# Patient Record
Sex: FEMALE | Race: WHITE | NOT HISPANIC OR LATINO | Employment: OTHER | URBAN - METROPOLITAN AREA
[De-identification: names, ages, dates, MRNs, and addresses within clinical notes are randomized per-mention and may not be internally consistent; named-entity substitution may affect disease eponyms.]

---

## 2023-01-01 ENCOUNTER — OFFICE VISIT (OUTPATIENT)
Dept: CARDIOLOGY | Facility: CLINIC | Age: 73
End: 2023-01-01
Payer: MEDICARE

## 2023-01-01 ENCOUNTER — APPOINTMENT (OUTPATIENT)
Dept: PAIN MEDICINE | Facility: CLINIC | Age: 73
End: 2023-01-01
Payer: MEDICARE

## 2023-01-01 ENCOUNTER — HOSPITAL ENCOUNTER (OUTPATIENT)
Dept: VASCULAR MEDICINE | Facility: CLINIC | Age: 73
Discharge: HOME | End: 2023-12-06
Payer: MEDICARE

## 2023-01-01 ENCOUNTER — OFFICE VISIT (OUTPATIENT)
Dept: WOUND CARE | Facility: CLINIC | Age: 73
End: 2023-01-01
Payer: MEDICARE

## 2023-01-01 ENCOUNTER — CLINICAL SUPPORT (OUTPATIENT)
Dept: WOUND CARE | Facility: CLINIC | Age: 73
End: 2023-01-01
Payer: MEDICARE

## 2023-01-01 ENCOUNTER — OFFICE VISIT (OUTPATIENT)
Dept: PAIN MEDICINE | Facility: CLINIC | Age: 73
End: 2023-01-01
Payer: MEDICARE

## 2023-01-01 ENCOUNTER — OFFICE VISIT (OUTPATIENT)
Dept: NEUROLOGY | Facility: CLINIC | Age: 73
End: 2023-01-01
Payer: MEDICARE

## 2023-01-01 ENCOUNTER — APPOINTMENT (OUTPATIENT)
Dept: WOUND CARE | Facility: CLINIC | Age: 73
End: 2023-01-01
Payer: MEDICARE

## 2023-01-01 VITALS
RESPIRATION RATE: 18 BRPM | TEMPERATURE: 97.2 F | HEIGHT: 65 IN | HEART RATE: 104 BPM | WEIGHT: 105 LBS | SYSTOLIC BLOOD PRESSURE: 159 MMHG | BODY MASS INDEX: 17.49 KG/M2 | DIASTOLIC BLOOD PRESSURE: 87 MMHG

## 2023-01-01 VITALS
HEART RATE: 93 BPM | DIASTOLIC BLOOD PRESSURE: 52 MMHG | OXYGEN SATURATION: 96 % | SYSTOLIC BLOOD PRESSURE: 100 MMHG | HEIGHT: 65 IN | BODY MASS INDEX: 17.49 KG/M2 | WEIGHT: 105 LBS

## 2023-01-01 VITALS — BODY MASS INDEX: 17.68 KG/M2 | WEIGHT: 110 LBS | HEIGHT: 66 IN

## 2023-01-01 DIAGNOSIS — M62.81 MUSCLE WEAKNESS: ICD-10-CM

## 2023-01-01 DIAGNOSIS — G20.A1 PARKINSON'S DISEASE WITHOUT FLUCTUATING MANIFESTATIONS, UNSPECIFIED WHETHER DYSKINESIA PRESENT (MULTI): ICD-10-CM

## 2023-01-01 DIAGNOSIS — E78.00 HYPERCHOLESTEROLEMIA: Primary | ICD-10-CM

## 2023-01-01 DIAGNOSIS — I70.1 RENAL ARTERY STENOSIS (CMS-HCC): ICD-10-CM

## 2023-01-01 DIAGNOSIS — E78.5 DYSLIPIDEMIA: Primary | ICD-10-CM

## 2023-01-01 DIAGNOSIS — I71.43 INFRARENAL ABDOMINAL AORTIC ANEURYSM (AAA) WITHOUT RUPTURE (CMS-HCC): ICD-10-CM

## 2023-01-01 DIAGNOSIS — M48.062 LUMBAR STENOSIS WITH NEUROGENIC CLAUDICATION: Primary | ICD-10-CM

## 2023-01-01 DIAGNOSIS — I48.0 PAROXYSMAL ATRIAL FIBRILLATION (MULTI): ICD-10-CM

## 2023-01-01 DIAGNOSIS — M48.062 LUMBAR STENOSIS WITH NEUROGENIC CLAUDICATION: ICD-10-CM

## 2023-01-01 DIAGNOSIS — M54.16 LUMBAR RADICULOPATHY: ICD-10-CM

## 2023-01-01 DIAGNOSIS — M43.10 DEGENERATIVE SPONDYLOLISTHESIS: Primary | ICD-10-CM

## 2023-01-01 DIAGNOSIS — I65.23 BILATERAL CAROTID ARTERY STENOSIS: ICD-10-CM

## 2023-01-01 DIAGNOSIS — F03.B18 MODERATE DEMENTIA WITH OTHER BEHAVIORAL DISTURBANCE, UNSPECIFIED DEMENTIA TYPE (MULTI): Primary | ICD-10-CM

## 2023-01-01 DIAGNOSIS — I71.40 ABDOMINAL AORTIC ANEURYSM, WITHOUT RUPTURE, UNSPECIFIED (CMS-HCC): ICD-10-CM

## 2023-01-01 DIAGNOSIS — R09.89 OTHER SPECIFIED SYMPTOMS AND SIGNS INVOLVING THE CIRCULATORY AND RESPIRATORY SYSTEMS: ICD-10-CM

## 2023-01-01 DIAGNOSIS — F03.90 DEMENTIA, UNSPECIFIED DEMENTIA SEVERITY, UNSPECIFIED DEMENTIA TYPE, UNSPECIFIED WHETHER BEHAVIORAL, PSYCHOTIC, OR MOOD DISTURBANCE OR ANXIETY (MULTI): Primary | ICD-10-CM

## 2023-01-01 LAB
ALANINE AMINOTRANSFERASE (SGPT) (U/L) IN SER/PLAS: 13 U/L (ref 7–45)
ALANINE AMINOTRANSFERASE (SGPT) (U/L) IN SER/PLAS: 31 U/L (ref 7–45)
ALBUMIN (G/DL) IN SER/PLAS: 2.3 G/DL (ref 3.4–5)
ALBUMIN (G/DL) IN SER/PLAS: 3.4 G/DL (ref 3.4–5)
ALKALINE PHOSPHATASE (U/L) IN SER/PLAS: 89 U/L (ref 33–136)
ALKALINE PHOSPHATASE (U/L) IN SER/PLAS: 93 U/L (ref 33–136)
ANION GAP IN SER/PLAS: 11 MMOL/L (ref 10–20)
ANION GAP IN SER/PLAS: 11 MMOL/L (ref 10–20)
ANION GAP IN SER/PLAS: 19 MMOL/L (ref 10–20)
ASPARTATE AMINOTRANSFERASE (SGOT) (U/L) IN SER/PLAS: 22 U/L (ref 9–39)
ASPARTATE AMINOTRANSFERASE (SGOT) (U/L) IN SER/PLAS: 24 U/L (ref 9–39)
BILIRUBIN TOTAL (MG/DL) IN SER/PLAS: 0.3 MG/DL (ref 0–1.2)
BILIRUBIN TOTAL (MG/DL) IN SER/PLAS: 0.5 MG/DL (ref 0–1.2)
CALCIDIOL (25 OH VITAMIN D3) (NG/ML) IN SER/PLAS: 84 NG/ML
CALCIDIOL (25 OH VITAMIN D3) (NG/ML) IN SER/PLAS: 88 NG/ML
CALCIUM (MG/DL) IN SER/PLAS: 7.1 MG/DL (ref 8.6–10.3)
CALCIUM (MG/DL) IN SER/PLAS: 8.4 MG/DL (ref 8.6–10.3)
CALCIUM (MG/DL) IN SER/PLAS: 9.2 MG/DL (ref 8.6–10.3)
CARBON DIOXIDE, TOTAL (MMOL/L) IN SER/PLAS: 21 MMOL/L (ref 21–32)
CARBON DIOXIDE, TOTAL (MMOL/L) IN SER/PLAS: 25 MMOL/L (ref 21–32)
CARBON DIOXIDE, TOTAL (MMOL/L) IN SER/PLAS: 29 MMOL/L (ref 21–32)
CHLORIDE (MMOL/L) IN SER/PLAS: 101 MMOL/L (ref 98–107)
CHLORIDE (MMOL/L) IN SER/PLAS: 103 MMOL/L (ref 98–107)
CHLORIDE (MMOL/L) IN SER/PLAS: 110 MMOL/L (ref 98–107)
CREATININE (MG/DL) IN SER/PLAS: 0.76 MG/DL (ref 0.5–1.05)
CREATININE (MG/DL) IN SER/PLAS: 0.93 MG/DL (ref 0.5–1.05)
CREATININE (MG/DL) IN SER/PLAS: 0.96 MG/DL (ref 0.5–1.05)
ERYTHROCYTE DISTRIBUTION WIDTH (RATIO) BY AUTOMATED COUNT: 15.3 % (ref 11.5–14.5)
ERYTHROCYTE DISTRIBUTION WIDTH (RATIO) BY AUTOMATED COUNT: 15.6 % (ref 11.5–14.5)
ERYTHROCYTE DISTRIBUTION WIDTH (RATIO) BY AUTOMATED COUNT: 16.3 % (ref 11.5–14.5)
ERYTHROCYTE MEAN CORPUSCULAR HEMOGLOBIN CONCENTRATION (G/DL) BY AUTOMATED: 28.6 G/DL (ref 32–36)
ERYTHROCYTE MEAN CORPUSCULAR HEMOGLOBIN CONCENTRATION (G/DL) BY AUTOMATED: 29.6 G/DL (ref 32–36)
ERYTHROCYTE MEAN CORPUSCULAR HEMOGLOBIN CONCENTRATION (G/DL) BY AUTOMATED: 29.6 G/DL (ref 32–36)
ERYTHROCYTE MEAN CORPUSCULAR VOLUME (FL) BY AUTOMATED COUNT: 95 FL (ref 80–100)
ERYTHROCYTE MEAN CORPUSCULAR VOLUME (FL) BY AUTOMATED COUNT: 95 FL (ref 80–100)
ERYTHROCYTE MEAN CORPUSCULAR VOLUME (FL) BY AUTOMATED COUNT: 98 FL (ref 80–100)
ERYTHROCYTES (10*6/UL) IN BLOOD BY AUTOMATED COUNT: 2.81 X10E12/L (ref 4–5.2)
ERYTHROCYTES (10*6/UL) IN BLOOD BY AUTOMATED COUNT: 3.37 X10E12/L (ref 4–5.2)
ERYTHROCYTES (10*6/UL) IN BLOOD BY AUTOMATED COUNT: 3.97 X10E12/L (ref 4–5.2)
FERRITIN (UG/LL) IN SER/PLAS: 64 UG/L (ref 8–150)
GFR FEMALE: 63 ML/MIN/1.73M2
GFR FEMALE: 65 ML/MIN/1.73M2
GFR FEMALE: 83 ML/MIN/1.73M2
GLUCOSE (MG/DL) IN SER/PLAS: 47 MG/DL (ref 74–99)
GLUCOSE (MG/DL) IN SER/PLAS: 82 MG/DL (ref 74–99)
GLUCOSE (MG/DL) IN SER/PLAS: 98 MG/DL (ref 74–99)
HEMATOCRIT (%) IN BLOOD BY AUTOMATED COUNT: 26.7 % (ref 36–46)
HEMATOCRIT (%) IN BLOOD BY AUTOMATED COUNT: 32.1 % (ref 36–46)
HEMATOCRIT (%) IN BLOOD BY AUTOMATED COUNT: 39.1 % (ref 36–46)
HEMOGLOBIN (G/DL) IN BLOOD: 11.2 G/DL (ref 12–16)
HEMOGLOBIN (G/DL) IN BLOOD: 7.9 G/DL (ref 12–16)
HEMOGLOBIN (G/DL) IN BLOOD: 9.5 G/DL (ref 12–16)
IRON (UG/DL) IN SER/PLAS: 34 UG/DL (ref 35–150)
IRON BINDING CAPACITY (UG/DL) IN SER/PLAS: 236 UG/DL (ref 240–445)
IRON SATURATION (%) IN SER/PLAS: 14 % (ref 25–45)
LEUKOCYTES (10*3/UL) IN BLOOD BY AUTOMATED COUNT: 10 X10E9/L (ref 4.4–11.3)
LEUKOCYTES (10*3/UL) IN BLOOD BY AUTOMATED COUNT: 7.5 X10E9/L (ref 4.4–11.3)
LEUKOCYTES (10*3/UL) IN BLOOD BY AUTOMATED COUNT: 9.2 X10E9/L (ref 4.4–11.3)
MAGNESIUM (MG/DL) IN SER/PLAS: 2.48 MG/DL (ref 1.6–2.4)
MISCELLANEUOUS TEST RESULT: NORMAL
NAME OF SENDOUT TEST: NORMAL
NRBC (PER 100 WBCS) BY AUTOMATED COUNT: 0 /100 WBC (ref 0–0)
PARATHYRIN INTACT (PG/ML) IN SER/PLAS: 42.9 PG/ML (ref 18.5–88)
PLATELETS (10*3/UL) IN BLOOD AUTOMATED COUNT: 299 X10E9/L (ref 150–450)
PLATELETS (10*3/UL) IN BLOOD AUTOMATED COUNT: 334 X10E9/L (ref 150–450)
PLATELETS (10*3/UL) IN BLOOD AUTOMATED COUNT: 371 X10E9/L (ref 150–450)
POTASSIUM (MMOL/L) IN SER/PLAS: 3 MMOL/L (ref 3.5–5.3)
POTASSIUM (MMOL/L) IN SER/PLAS: 3.8 MMOL/L (ref 3.5–5.3)
POTASSIUM (MMOL/L) IN SER/PLAS: 4.3 MMOL/L (ref 3.5–5.3)
PROTEIN TOTAL: 5.7 G/DL (ref 6.4–8.2)
PROTEIN TOTAL: 8.2 G/DL (ref 6.4–8.2)
SODIUM (MMOL/L) IN SER/PLAS: 137 MMOL/L (ref 136–145)
SODIUM (MMOL/L) IN SER/PLAS: 139 MMOL/L (ref 136–145)
SODIUM (MMOL/L) IN SER/PLAS: 143 MMOL/L (ref 136–145)
THYROTROPIN (MIU/L) IN SER/PLAS BY DETECTION LIMIT <= 0.05 MIU/L: 1.41 MIU/L (ref 0.44–3.98)
THYROTROPIN (MIU/L) IN SER/PLAS BY DETECTION LIMIT <= 0.05 MIU/L: 1.89 MIU/L (ref 0.44–3.98)
UREA NITROGEN (MG/DL) IN SER/PLAS: 12 MG/DL (ref 6–23)
UREA NITROGEN (MG/DL) IN SER/PLAS: 13 MG/DL (ref 6–23)
UREA NITROGEN (MG/DL) IN SER/PLAS: 17 MG/DL (ref 6–23)

## 2023-01-01 PROCEDURE — 99213 OFFICE O/P EST LOW 20 MIN: CPT | Performed by: ANESTHESIOLOGY

## 2023-01-01 PROCEDURE — 1160F RVW MEDS BY RX/DR IN RCRD: CPT | Performed by: PSYCHIATRY & NEUROLOGY

## 2023-01-01 PROCEDURE — 1160F RVW MEDS BY RX/DR IN RCRD: CPT | Performed by: ANESTHESIOLOGY

## 2023-01-01 PROCEDURE — 1036F TOBACCO NON-USER: CPT | Performed by: PSYCHIATRY & NEUROLOGY

## 2023-01-01 PROCEDURE — 3079F DIAST BP 80-89 MM HG: CPT | Performed by: PSYCHIATRY & NEUROLOGY

## 2023-01-01 PROCEDURE — 1159F MED LIST DOCD IN RCRD: CPT | Performed by: ANESTHESIOLOGY

## 2023-01-01 PROCEDURE — 1159F MED LIST DOCD IN RCRD: CPT | Performed by: PSYCHIATRY & NEUROLOGY

## 2023-01-01 PROCEDURE — 93975 VASCULAR STUDY: CPT | Performed by: SURGERY

## 2023-01-01 PROCEDURE — 11042 DBRDMT SUBQ TIS 1ST 20SQCM/<: CPT

## 2023-01-01 PROCEDURE — 3074F SYST BP LT 130 MM HG: CPT | Performed by: INTERNAL MEDICINE

## 2023-01-01 PROCEDURE — 93880 EXTRACRANIAL BILAT STUDY: CPT | Performed by: SURGERY

## 2023-01-01 PROCEDURE — 3008F BODY MASS INDEX DOCD: CPT | Performed by: PSYCHIATRY & NEUROLOGY

## 2023-01-01 PROCEDURE — 93978 VASCULAR STUDY: CPT

## 2023-01-01 PROCEDURE — 3077F SYST BP >= 140 MM HG: CPT | Performed by: PSYCHIATRY & NEUROLOGY

## 2023-01-01 PROCEDURE — 93880 EXTRACRANIAL BILAT STUDY: CPT

## 2023-01-01 PROCEDURE — 3066F NEPHROPATHY DOC TX: CPT | Performed by: PSYCHIATRY & NEUROLOGY

## 2023-01-01 PROCEDURE — 11042 DBRDMT SUBQ TIS 1ST 20SQCM/<: CPT | Performed by: NURSE PRACTITIONER

## 2023-01-01 PROCEDURE — 1159F MED LIST DOCD IN RCRD: CPT | Performed by: INTERNAL MEDICINE

## 2023-01-01 PROCEDURE — 3008F BODY MASS INDEX DOCD: CPT | Performed by: ANESTHESIOLOGY

## 2023-01-01 PROCEDURE — 99215 OFFICE O/P EST HI 40 MIN: CPT | Performed by: INTERNAL MEDICINE

## 2023-01-01 PROCEDURE — 1125F AMNT PAIN NOTED PAIN PRSNT: CPT | Performed by: PSYCHIATRY & NEUROLOGY

## 2023-01-01 PROCEDURE — 99204 OFFICE O/P NEW MOD 45 MIN: CPT | Performed by: PSYCHIATRY & NEUROLOGY

## 2023-01-01 PROCEDURE — 3066F NEPHROPATHY DOC TX: CPT | Performed by: ANESTHESIOLOGY

## 2023-01-01 PROCEDURE — 93975 VASCULAR STUDY: CPT

## 2023-01-01 PROCEDURE — 3066F NEPHROPATHY DOC TX: CPT | Performed by: INTERNAL MEDICINE

## 2023-01-01 PROCEDURE — 3078F DIAST BP <80 MM HG: CPT | Performed by: INTERNAL MEDICINE

## 2023-01-01 PROCEDURE — 1125F AMNT PAIN NOTED PAIN PRSNT: CPT | Performed by: INTERNAL MEDICINE

## 2023-01-01 PROCEDURE — 1125F AMNT PAIN NOTED PAIN PRSNT: CPT | Performed by: ANESTHESIOLOGY

## 2023-01-01 PROCEDURE — 1160F RVW MEDS BY RX/DR IN RCRD: CPT | Performed by: INTERNAL MEDICINE

## 2023-01-01 PROCEDURE — 3008F BODY MASS INDEX DOCD: CPT | Performed by: INTERNAL MEDICINE

## 2023-01-01 RX ORDER — LANCETS
EACH MISCELLANEOUS
COMMUNITY
End: 2024-01-01 | Stop reason: ENTERED-IN-ERROR

## 2023-01-01 RX ORDER — ROSUVASTATIN CALCIUM 40 MG/1
1 TABLET, COATED ORAL DAILY
COMMUNITY
Start: 2022-03-05 | End: 2023-01-01

## 2023-01-01 RX ORDER — NALOXONE HYDROCHLORIDE 4 MG/.1ML
0.1 SPRAY NASAL AS NEEDED
COMMUNITY
Start: 2021-09-29 | End: 2024-01-01 | Stop reason: ENTERED-IN-ERROR

## 2023-01-01 RX ORDER — OXYCODONE AND ACETAMINOPHEN 5; 325 MG/1; MG/1
1 TABLET ORAL EVERY 8 HOURS PRN
COMMUNITY
Start: 2022-09-29 | End: 2023-01-01 | Stop reason: SDUPTHER

## 2023-01-01 RX ORDER — ACETAMINOPHEN 325 MG/1
650 TABLET ORAL EVERY 4 HOURS PRN
COMMUNITY
Start: 2022-12-09 | End: 2024-01-01 | Stop reason: ENTERED-IN-ERROR

## 2023-01-01 RX ORDER — OXYCODONE AND ACETAMINOPHEN 5; 325 MG/1; MG/1
1 TABLET ORAL EVERY 8 HOURS PRN
Qty: 90 TABLET | Refills: 0 | Status: SHIPPED | OUTPATIENT
Start: 2023-01-01 | End: 2023-01-01 | Stop reason: SDUPTHER

## 2023-01-01 RX ORDER — GABAPENTIN 100 MG/1
100 CAPSULE ORAL 3 TIMES DAILY
COMMUNITY
Start: 2020-08-06 | End: 2023-01-01 | Stop reason: SDUPTHER

## 2023-01-01 RX ORDER — BLOOD SUGAR DIAGNOSTIC
STRIP MISCELLANEOUS
COMMUNITY
Start: 2020-07-21 | End: 2024-01-01 | Stop reason: ENTERED-IN-ERROR

## 2023-01-01 RX ORDER — DULOXETIN HYDROCHLORIDE 30 MG/1
30 CAPSULE, DELAYED RELEASE ORAL DAILY
COMMUNITY
Start: 2023-01-01 | End: 2024-01-01 | Stop reason: HOSPADM

## 2023-01-01 RX ORDER — POTASSIUM CHLORIDE 20 MEQ/1
1 TABLET, EXTENDED RELEASE ORAL DAILY
COMMUNITY
End: 2023-01-01 | Stop reason: WASHOUT

## 2023-01-01 RX ORDER — GLIMEPIRIDE 1 MG/1
0.5 TABLET ORAL EVERY MORNING
COMMUNITY
Start: 2023-01-01 | End: 2024-01-01 | Stop reason: HOSPADM

## 2023-01-01 RX ORDER — OXYCODONE AND ACETAMINOPHEN 5; 325 MG/1; MG/1
1 TABLET ORAL EVERY 8 HOURS PRN
Qty: 90 TABLET | Refills: 0 | Status: SHIPPED | OUTPATIENT
Start: 2023-01-01 | End: 2024-01-01 | Stop reason: SDUPTHER

## 2023-01-01 RX ORDER — IBUPROFEN 200 MG
1 TABLET ORAL EVERY 24 HOURS
COMMUNITY
Start: 2022-12-09 | End: 2023-01-01 | Stop reason: WASHOUT

## 2023-01-01 RX ORDER — METOPROLOL TARTRATE 50 MG/1
50 TABLET ORAL EVERY 12 HOURS
COMMUNITY
Start: 2022-12-09 | End: 2023-01-01 | Stop reason: WASHOUT

## 2023-01-01 RX ORDER — TRAZODONE HYDROCHLORIDE 50 MG/1
50 TABLET ORAL NIGHTLY PRN
COMMUNITY
Start: 2018-02-19 | End: 2023-01-01 | Stop reason: WASHOUT

## 2023-01-01 RX ORDER — LEVOTHYROXINE SODIUM 88 UG/1
88 TABLET ORAL
COMMUNITY
Start: 2023-01-01

## 2023-01-01 RX ORDER — CARBIDOPA AND LEVODOPA 25; 100 MG/1; MG/1
TABLET ORAL
Qty: 270 TABLET | Refills: 3 | Status: SHIPPED
Start: 2023-01-01 | End: 2024-03-24

## 2023-01-01 RX ORDER — LEVOTHYROXINE SODIUM 112 UG/1
1 TABLET ORAL DAILY
COMMUNITY
End: 2023-01-01 | Stop reason: WASHOUT

## 2023-01-01 RX ORDER — CALCITRIOL 0.25 UG/1
0.25 CAPSULE ORAL
COMMUNITY
Start: 2021-04-09

## 2023-01-01 RX ORDER — METOPROLOL TARTRATE 25 MG/1
1 TABLET, FILM COATED ORAL 2 TIMES DAILY
COMMUNITY
Start: 2020-06-12

## 2023-01-01 RX ORDER — ROSUVASTATIN CALCIUM 40 MG/1
40 TABLET, COATED ORAL DAILY
Qty: 30 TABLET | Refills: 2 | Status: SHIPPED | OUTPATIENT
Start: 2023-01-01 | End: 2024-01-01

## 2023-01-01 RX ORDER — TRAMADOL HYDROCHLORIDE 50 MG/1
50 TABLET ORAL EVERY 6 HOURS PRN
COMMUNITY
Start: 2022-12-09 | End: 2023-01-01 | Stop reason: WASHOUT

## 2023-01-01 RX ORDER — GABAPENTIN 100 MG/1
100 CAPSULE ORAL 3 TIMES DAILY
Qty: 90 CAPSULE | Refills: 3 | Status: ON HOLD | OUTPATIENT
Start: 2023-01-01 | End: 2024-01-01

## 2023-01-01 RX ORDER — GLIMEPIRIDE 2 MG/1
1 TABLET ORAL DAILY
COMMUNITY
End: 2023-01-01 | Stop reason: WASHOUT

## 2023-01-01 RX ORDER — VIT C/E/ZN/COPPR/LUTEIN/ZEAXAN 250MG-90MG
25 CAPSULE ORAL DAILY
COMMUNITY
End: 2024-01-01 | Stop reason: ENTERED-IN-ERROR

## 2023-01-01 RX ORDER — TORSEMIDE 10 MG/1
1 TABLET ORAL DAILY
COMMUNITY
Start: 2022-05-17 | End: 2023-01-01 | Stop reason: WASHOUT

## 2023-01-01 RX ORDER — TAMSULOSIN HYDROCHLORIDE 0.4 MG/1
0.4 CAPSULE ORAL DAILY
COMMUNITY
Start: 2023-01-01 | End: 2024-01-01 | Stop reason: ENTERED-IN-ERROR

## 2023-01-01 ASSESSMENT — ENCOUNTER SYMPTOMS
LOSS OF SENSATION IN FEET: 1
SHORTNESS OF BREATH: 0
DEPRESSION: 1
FEVER: 0
OCCASIONAL FEELINGS OF UNSTEADINESS: 1
BACK PAIN: 1

## 2023-01-01 ASSESSMENT — LIFESTYLE VARIABLES: TOTAL SCORE: 0

## 2023-01-01 ASSESSMENT — PATIENT HEALTH QUESTIONNAIRE - PHQ9
SUM OF ALL RESPONSES TO PHQ9 QUESTIONS 1 AND 2: 0
2. FEELING DOWN, DEPRESSED OR HOPELESS: NOT AT ALL
1. LITTLE INTEREST OR PLEASURE IN DOING THINGS: NOT AT ALL

## 2023-01-01 ASSESSMENT — PAIN - FUNCTIONAL ASSESSMENT: PAIN_FUNCTIONAL_ASSESSMENT: 0-10

## 2023-01-01 ASSESSMENT — COLUMBIA-SUICIDE SEVERITY RATING SCALE - C-SSRS
1. IN THE PAST MONTH, HAVE YOU WISHED YOU WERE DEAD OR WISHED YOU COULD GO TO SLEEP AND NOT WAKE UP?: NO
2. HAVE YOU ACTUALLY HAD ANY THOUGHTS OF KILLING YOURSELF?: NO
6. HAVE YOU EVER DONE ANYTHING, STARTED TO DO ANYTHING, OR PREPARED TO DO ANYTHING TO END YOUR LIFE?: NO

## 2023-01-01 ASSESSMENT — PAIN SCALES - GENERAL
PAINLEVEL_OUTOF10: 10 - WORST POSSIBLE PAIN
PAINLEVEL: INELIGIBLE

## 2023-01-01 ASSESSMENT — PAIN DESCRIPTION - DESCRIPTORS: DESCRIPTORS: ACHING;BURNING

## 2023-09-23 PROBLEM — M47.816 FACET DEGENERATION OF LUMBAR REGION: Status: ACTIVE | Noted: 2023-01-01

## 2023-09-23 PROBLEM — E03.9 HYPOTHYROIDISM: Status: ACTIVE | Noted: 2023-01-01

## 2023-09-23 PROBLEM — M43.10 DEGENERATIVE SPONDYLOLISTHESIS: Status: ACTIVE | Noted: 2023-01-01

## 2023-09-23 PROBLEM — L97.519 ULCER OF RIGHT FOOT (MULTI): Status: ACTIVE | Noted: 2023-01-01

## 2023-09-23 PROBLEM — I71.40 AAA (ABDOMINAL AORTIC ANEURYSM) (CMS-HCC): Status: ACTIVE | Noted: 2023-01-01

## 2023-09-23 PROBLEM — I65.29 CAROTID STENOSIS: Status: ACTIVE | Noted: 2023-01-01

## 2023-09-23 PROBLEM — R60.0 LOWER EXTREMITY EDEMA: Status: ACTIVE | Noted: 2023-01-01

## 2023-09-23 PROBLEM — I25.10 CORONARY ATHEROSCLEROSIS: Status: ACTIVE | Noted: 2023-01-01

## 2023-09-23 PROBLEM — N18.9 CKD (CHRONIC KIDNEY DISEASE): Status: ACTIVE | Noted: 2023-01-01

## 2023-09-23 PROBLEM — E78.00 HYPERCHOLESTEROLEMIA: Status: ACTIVE | Noted: 2023-01-01

## 2023-09-23 PROBLEM — I48.91 A-FIB (MULTI): Status: ACTIVE | Noted: 2023-01-01

## 2023-09-23 PROBLEM — I65.23 BILATERAL CAROTID ARTERY STENOSIS: Status: ACTIVE | Noted: 2023-01-01

## 2023-09-23 PROBLEM — I73.9 PAD (PERIPHERAL ARTERY DISEASE) (CMS-HCC): Status: ACTIVE | Noted: 2023-01-01

## 2023-09-23 PROBLEM — I67.1 SACCULAR ANEURYSM (HHS-HCC): Status: ACTIVE | Noted: 2023-01-01

## 2023-09-23 PROBLEM — M54.16 LUMBAR RADICULOPATHY: Status: ACTIVE | Noted: 2023-01-01

## 2023-09-23 PROBLEM — I21.4 NON-ST ELEVATION MYOCARDIAL INFARCTION (NSTEMI) IN RECOVERY PHASE (MULTI): Status: ACTIVE | Noted: 2023-01-01

## 2023-09-23 PROBLEM — M48.062 LUMBAR STENOSIS WITH NEUROGENIC CLAUDICATION: Status: ACTIVE | Noted: 2023-01-01

## 2023-09-23 PROBLEM — R09.89 CAROTID BRUIT: Status: ACTIVE | Noted: 2023-01-01

## 2023-09-23 PROBLEM — M46.1 SACROILIITIS (CMS-HCC): Status: ACTIVE | Noted: 2023-01-01

## 2023-09-23 PROBLEM — I10 HYPERTENSION: Status: ACTIVE | Noted: 2023-01-01

## 2023-09-23 PROBLEM — Z95.5 H/O HEART ARTERY STENT: Status: ACTIVE | Noted: 2023-01-01

## 2023-09-23 PROBLEM — I70.1 RENAL ARTERY STENOSIS (CMS-HCC): Status: ACTIVE | Noted: 2023-01-01

## 2023-09-23 PROBLEM — E10.9 DM (DIABETES MELLITUS), TYPE 1 (MULTI): Status: ACTIVE | Noted: 2023-01-01

## 2023-09-23 PROBLEM — R78.81 BACTEREMIA: Status: ACTIVE | Noted: 2023-01-01

## 2023-09-23 PROBLEM — M51.26 LUMBAR HERNIATED DISC: Status: ACTIVE | Noted: 2023-01-01

## 2023-09-23 PROBLEM — E07.9 THYROID DISEASE: Status: ACTIVE | Noted: 2023-01-01

## 2023-09-23 PROBLEM — I51.9 HEART DISEASE: Status: ACTIVE | Noted: 2023-01-01

## 2023-10-20 NOTE — PROGRESS NOTES
Chief Complain  Follow-up (For pain in lower back, that have been going on for several years. Currently taking percocet and it does not help.)       History Of Present Illness  Irina Stratton is a 72 y.o. female here for low back pain. The patient rates the pain at 7  on a scale from 0-10.  The patient describes pain as dull, aching.  The pain is worsened by walking and standing and is alleviated by medications prescribed pain medications.  Since the last visit the pain has stayed the same.  The patient denies any fever, chills, weight loss, bladder/bowel incontinence.         Past Medical History  She has a past medical history of Abnormal radiologic findings on diagnostic imaging of right kidney and Personal history of other medical treatment.    Surgical History  She has a past surgical history that includes  section, classic (2016); Other surgical history (2020); Other surgical history (2020); Other surgical history (2020); Other surgical history (2020); Other surgical history (2019); CT angio abdomen pelvis w and or wo IV IV contrast (2019); and CT angio aorta and bilateral iliofemoral runoff w and or wo IV contrast (2020).     Social History  She has no history on file for tobacco use, alcohol use, and drug use.    Family History  Family History   Problem Relation Name Age of Onset    No Known Problems Mother          Allergies  Patient has no known allergies.    Review of Systems  Review of Systems   Constitutional:  Negative for fever.   Respiratory:  Negative for shortness of breath.    Cardiovascular:  Negative for chest pain.   Musculoskeletal:  Positive for back pain.        Physical Exam  Physical Exam  HENT:      Head: Normocephalic.   Eyes:      Extraocular Movements: Extraocular movements intact.      Pupils: Pupils are equal, round, and reactive to light.   Pulmonary:      Effort: Pulmonary effort is normal.   Neurological:      Mental Status:  "She is oriented to person, place, and time.           Last Recorded Vitals  Height 1.676 m (5' 6\"), weight 49.9 kg (110 lb).       Assessment/Plan     Irina Stratton is a 72 y.o. female here for follow-up of low back pain.  She has been experiencing the symptoms for years, previously failed epidural steroid injections.  Now on chronic opiate therapy currently taking Percocet 5 mg 3 times daily.  She is a poor historian, appears to be having significant issues with her memory.  Would recommend referral to neurology for further management of her memory issues.  As far as Percocet is concerned it does provide her with modest relief of pain.  I discussed with the patient and the family to utilize Tylenol preferably to manage her pain use Percocet only for severe pain.  Denies any significant side effects from the Percocet.  I have personally reviewed the OARRS report. This report is scanned into the electronic medical record. I have considered the risks of abuse, dependence, addiction and diversion.         Ramiro Liu MD  "

## 2023-11-07 NOTE — TELEPHONE ENCOUNTER
Dad canceled appointment today and will like to reschedule for an appointment after 4:30.  Please advise Please call to schedule f/u

## 2023-11-08 NOTE — TELEPHONE ENCOUNTER
Hi Dr Liu, can you please take a look at this patient for me.  She just saw you on October 20 and does have a FUV scheduled for 1/19/24 with you.  Can you please advise if you need to see her sooner for medicine refill?

## 2023-12-07 PROBLEM — N18.4 CHRONIC KIDNEY DISEASE, STAGE 4 (SEVERE) (MULTI): Status: ACTIVE | Noted: 2022-12-12

## 2023-12-07 PROBLEM — D64.9 ANEMIA, UNSPECIFIED: Status: ACTIVE | Noted: 2022-12-12

## 2023-12-07 PROBLEM — I44.7 LEFT BUNDLE-BRANCH BLOCK, UNSPECIFIED: Status: ACTIVE | Noted: 2022-12-12

## 2023-12-07 PROBLEM — I15.0: Status: ACTIVE | Noted: 2023-01-01

## 2023-12-07 PROBLEM — E87.6 HYPOKALEMIA: Status: ACTIVE | Noted: 2022-12-12

## 2023-12-07 PROBLEM — M54.50 LOW BACK PAIN, UNSPECIFIED: Status: ACTIVE | Noted: 2022-12-12

## 2023-12-07 PROBLEM — I25.2 OLD MYOCARDIAL INFARCTION: Status: ACTIVE | Noted: 2022-12-12

## 2023-12-07 PROBLEM — M19.90 UNSPECIFIED OSTEOARTHRITIS, UNSPECIFIED SITE: Status: ACTIVE | Noted: 2018-10-18

## 2023-12-07 PROBLEM — F17.200 NICOTINE DEPENDENCE, UNSPECIFIED, UNCOMPLICATED: Status: ACTIVE | Noted: 2018-10-18

## 2023-12-07 PROBLEM — F32.A DEPRESSION, UNSPECIFIED: Status: ACTIVE | Noted: 2023-01-01

## 2023-12-07 PROBLEM — E87.20 ACIDOSIS, UNSPECIFIED: Status: ACTIVE | Noted: 2023-01-01

## 2023-12-07 PROBLEM — R77.0 ABNORMALITY OF ALBUMIN: Status: ACTIVE | Noted: 2023-01-01

## 2023-12-07 PROBLEM — Z72.0 TOBACCO USE: Status: ACTIVE | Noted: 2018-10-18

## 2023-12-07 PROBLEM — E78.5 HYPERLIPIDEMIA: Status: ACTIVE | Noted: 2018-10-18

## 2023-12-07 PROBLEM — D72.829 ELEVATED WHITE BLOOD CELL COUNT, UNSPECIFIED: Status: ACTIVE | Noted: 2023-01-01

## 2023-12-07 PROBLEM — I70.1: Status: ACTIVE | Noted: 2023-01-01

## 2023-12-07 PROBLEM — I48.0 PAROXYSMAL ATRIAL FIBRILLATION (MULTI): Status: ACTIVE | Noted: 2022-12-12

## 2023-12-07 PROBLEM — N13.9 OBSTRUCTIVE AND REFLUX UROPATHY, UNSPECIFIED: Status: ACTIVE | Noted: 2023-01-01

## 2023-12-07 PROBLEM — L89.154 PRESSURE ULCER OF SACRAL REGION, STAGE 4 (MULTI): Status: ACTIVE | Noted: 2023-01-01

## 2023-12-07 PROBLEM — K80.80 OTHER CHOLELITHIASIS WITHOUT OBSTRUCTION: Status: ACTIVE | Noted: 2022-12-12

## 2023-12-07 PROBLEM — N28.1 CYST OF KIDNEY, ACQUIRED: Status: ACTIVE | Noted: 2022-12-12

## 2023-12-07 PROBLEM — E46 UNSPECIFIED PROTEIN-CALORIE MALNUTRITION (MULTI): Status: ACTIVE | Noted: 2023-01-01

## 2023-12-07 PROBLEM — I65.29 OCCLUSION AND STENOSIS OF UNSPECIFIED CAROTID ARTERY: Status: ACTIVE | Noted: 2018-10-18

## 2023-12-07 PROBLEM — J43.9 EMPHYSEMA, UNSPECIFIED (MULTI): Status: ACTIVE | Noted: 2022-12-12

## 2023-12-07 PROBLEM — G47.00 INSOMNIA, UNSPECIFIED: Status: ACTIVE | Noted: 2022-12-12

## 2023-12-08 NOTE — PATIENT INSTRUCTIONS
Hematuria - keep eye on it over weekend; we will call you Monday to check in and see if we need to make adjustments.  Referral is made to Dr. Ottoniel Dominguez for your back.  Follow up with us in 6 months with cholesterol panel.  Think about [facility] for your atrial fibrillation.

## 2023-12-08 NOTE — PROGRESS NOTES
PCP: Felipe Rojo MD      Subjective   Irina Stratton is a 73 y.o. female who is here for follow up of  panvascular disease .  Since last visit,  appears more frail .  Patient seen with  and daughter.     Review of Systems:  Otherwise, limited cardiovascular review of systems is negative.    Past Medical History:  She has a past medical history of Abnormal radiologic findings on diagnostic imaging of right kidney and Personal history of other medical treatment.    Surgical History:   She has a past surgical history that includes  section, classic (2016); Other surgical history (2020); Other surgical history (2020); Other surgical history (2020); Other surgical history (2020); Other surgical history (2019); CT angio abdomen pelvis w and or wo IV IV contrast (2019); and CT angio aorta and bilateral iliofemoral runoff w and or wo IV contrast (2020).    Family History:   Family History   Problem Relation Name Age of Onset    No Known Problems Mother       Family History   Problem Relation Name Age of Onset    No Known Problems Mother         Social History:   Tobacco Use: Not on file       Outpatient Medications:    Current Outpatient Medications:     acetaminophen (Tylenol) 325 mg tablet, Take 2 tablets (650 mg) by mouth every 4 hours if needed for fever (temp greater than 38.0 C) (Pain)., Disp: , Rfl:     blood sugar diagnostic (Accu-Chek Guide test strips) strip, Accu-Chek Guide In Vitro Strip  Quantity: 100  Refills: 0      Start : 2020  Active, Disp: , Rfl:     calcitriol (Rocaltrol) 0.25 mcg capsule, Take 1 capsule (0.25 mcg) by mouth. Monday, Wednesday, and Friday, Disp: , Rfl:     cholecalciferol (Vitamin D-3) 25 MCG (1000 UT) capsule, Take 1 capsule (25 mcg) by mouth once daily., Disp: , Rfl:     DULoxetine (Cymbalta) 30 mg DR capsule, Take 1 capsule (30 mg) by mouth once daily., Disp: , Rfl:     gabapentin (Neurontin) 100 mg capsule,  "Take 1 capsule (100 mg) by mouth 3 times a day., Disp: , Rfl:     glimepiride (Amaryl) 1 mg tablet, Take 1 tablet (1 mg) by mouth once daily in the morning. Take before meals., Disp: , Rfl:     lancets misc, Accu-chek Fastclix Lancets, Disp: , Rfl:     levothyroxine (Synthroid, Levoxyl) 88 mcg tablet, Take 1 tablet (88 mcg) by mouth once daily in the morning. Take before meals., Disp: , Rfl:     metoprolol tartrate (Lopressor) 25 mg tablet, Take 1 tablet (25 mg) by mouth 2 times a day., Disp: , Rfl:     naloxone (Narcan) 4 mg/0.1 mL nasal spray, Administer 1 spray (4 mg) into affected nostril(s) if needed., Disp: , Rfl:     oxyCODONE-acetaminophen (Percocet) 5-325 mg tablet, Take 1 tablet by mouth every 8 hours if needed for severe pain (7 - 10) or moderate pain (4 - 6)., Disp: 90 tablet, Rfl: 0    rivaroxaban (Xarelto) 20 mg tablet, Take 1 tablet (20 mg) by mouth once daily., Disp: , Rfl:     rosuvastatin (Crestor) 40 mg tablet, Take 1 tablet (40 mg) by mouth once daily., Disp: 30 tablet, Rfl: 2    tamsulosin (Flomax) 0.4 mg 24 hr capsule, Take 1 capsule (0.4 mg) by mouth once daily., Disp: , Rfl:      Allergies:  Patient has no known allergies.       Objective   Vital Signs:  /52 (BP Location: Left arm, Patient Position: Sitting, BP Cuff Size: Adult)   Pulse 93   Ht 1.651 m (5' 5\")   Wt 47.6 kg (105 lb)   SpO2 96%   BMI 17.47 kg/m²     Physical Exam:  General: frail, cachectic  HEENT: EOMI, no scleral icterus.  Lungs: Clear to auscultation bilaterally without wheezing, rales, or rhonchi.  Cardiovascular: Regular rhythm and rate. Normal S1 and S2. No murmurs, rubs, or gallops are appreciated. JVP normal.  right carotid bruit  Abdomen: Soft, nontender, nondistended. Bowel sounds present.  Extremities: 1+ PT RLE, 1+ PT LLE, and no edema BLE.  Neurologic: Alert and oriented x3.  Has chronic albarran d/t sacral ulcer - blood tinged (cranberry juice in color)    Pertinent Recent Cardiovascular Studies " (personally reviewed):  Vascular studies:  Carotid duplex 12/8/2023: no ICA stenosis - velocities are lower than 2022 study  Aortic duplex 12/8/2023: stable EVAR  Renal duplex 12/8/2023: widely patent stent    Laboratory values:  CMP:  Recent Labs     07/24/23  1222 07/10/23  0600 07/08/23  0400 07/05/23  0621 07/04/23  1020 07/03/23  0703 07/02/23  0625 07/01/23  0615 05/22/23  1151 12/14/22  0700 12/05/22  1744 12/05/22  0851 05/05/20  1032 02/28/20  1746 01/17/20  1600 01/17/20  0505 01/16/20  1830 01/16/20  0515 01/15/20  0218 01/14/20  0357    139 143 140 138 136 140 140   < > 141   < > 135*   < > 136   < > 134*   < > 135* 134* 136   K 4.3 3.8 3.0* 4.1 4.0 3.4* 3.7 3.4*   < > 4.7   < > 2.8*   < > 3.8   < > 4.2   < > 3.8 3.9 3.3*    103 110* 109* 108* 107 106 106   < > 109*   < > 98   < > 100   < > 105   < > 105 104 106   CO2 21 29 25 24 21 21 23 24   < > 23   < > 26   < > 22   < > 14*   < > 21 21 19*   ANIONGAP 19 11 11 11 13 11 15 13   < > 14   < > 14   < > 18   < > 19   < > 13 13 14   BUN 13 17 12 10 10 12 14 17   < > 16   < > 44*   < > 26*   < > 12   < > 11 11 8   CREATININE 0.76 0.93 0.96 0.75 0.76 0.91 1.02 0.91   < > 1.11*   < > 1.69*   < > 1.42*   < > 0.79   < > 0.87 0.97 0.86   MG 2.48*  --   --   --   --   --   --   --   --  2.10  --  1.88  --  1.81  --  1.93  --  2.05 2.29 1.41*    < > = values in this interval not displayed.     Recent Labs     07/24/23  1222 07/08/23  0400 07/03/23  0703 07/02/23  0625 07/01/23  0615   ALBUMIN 3.4 2.3* 2.3* 2.5* 2.5*   ALKPHOS 93 89 82 93 79   ALT 13 31 71* 107* 88*   AST 22 24 70* 141* 128*   BILITOT 0.5 0.3 0.5 0.6 0.8     CBC:  Recent Labs     07/24/23  1222 07/10/23  0600 07/08/23  0400 07/05/23  0621 07/03/23  0703 07/02/23  0625 07/01/23  0615 06/30/23  0618   WBC 9.2 10.0 7.5 8.9 8.8 10.5 12.3* 14.8*   HGB 11.2* 9.5* 7.9* 10.2* 9.5* 10.4* 8.8* 9.8*   HCT 39.1 32.1* 26.7* 34.5* 33.6* 35.3* 29.9* 33.2*    371 334 365 296 335 307 323   MCV 98  95 95 98 103* 95 95 99     COAG:   Recent Labs     12/07/22  0444 12/06/22  0302 12/05/22  2249 12/05/22  1744 12/05/22  0647 05/31/20  0614 02/28/20  1746 01/13/20  1445 01/07/20  0830 10/12/18  1750   INR  --   --   --   --  1.2* 1.1 1.1 1.1 1.0 1.0   HAUF 0.8 0.4 0.4 1.1*  --   --   --   --   --   --      ABO:   Recent Labs     06/03/20  0510   ABO B     HEME/ENDO:  Recent Labs     07/24/23  1222 07/10/23  0600 07/08/23  0400 05/22/23  1151 12/14/22  0700 12/05/22  1228 10/03/22  1227 07/19/21  1202 01/26/21  1004 01/26/21  1004 11/02/20  1025 01/07/20  1011   FERRITIN  --  64  --   --   --  82  --   --   --   --   --   --    IRONSAT  --  14*  --   --   --  13*  --   --   --   --  11*  --    TSH 1.89  --  1.41 0.67 0.08*  --    < > 3.79   < > 0.24*  --   --    HGBA1C  --   --   --   --  7.4*  --   --  8.2  --  7.8  --  7.7    < > = values in this interval not displayed.      CARDIAC:   Recent Labs     12/04/22  2359 12/04/22  2157 12/04/22  1728   TROPHS 497* 467* 446*     Recent Labs     07/19/21  1202 11/02/20  1022 05/05/20  1032   CHOL 178 218* 203*   LDLF 92 - -   HDL 36.6* 37.1* 35.8*   TRIG 245* 428* 420*     MICRO:   Recent Labs     06/29/23  1736   CRP 13.30*     Susceptibility data from last 90 days.  Collected Specimen Info Organism Clindamycin Erythromycin Oxacillin Tetracycline Trimethoprim/Sulfamethoxazole Vancomycin   09/29/23 Tissue/Wound Staphylococcus aureus R R R I R S          I have personally reviewed most recent PCP, cardiology, vascular, and/or podiatry documentation.      Assessment/Plan   73 y.o. female with CAD s/p PCI (post-op MI after EVAR), pAF on DOAC, HTN, CKD with occluded LRA s/p RRA stent, AAA s/p EVAR, DM, DLD, R asymptomatic carotid disease (velocities are lower today). CLTI bilaterally status post CFA endarterectomy.     Patient previously introduced to [facility]; not ready for this decision at this time.    She is limited in mobility due to back pain. Has been seen by   Oscar previously - did not want to operate on aspirin; her risks are high coming off all anti-plt/anti-coag due to prior post-op MI.    Lastly, has hematuria x1 day from chronic indwelling albarran (seems cranberry juice color).     Plan:  For hematuria - continue monitor over weekend; we will call her Monday to see if Xarelto interruption indicated (if so, needs to use aspirin 81 mg daily while off).  For secondary prevention, FLP is due.  Consideration of [facility] again discussed.  Referral to Dr. Tani Dominguez for second opinion re: back surgical options.  Encouraged PO intake with Boost and [employer].  May need to consider starting paperwork for HPOA etc. - d/w pt//daughter.  Follow up 6 months.         SIGNATURE: Mario Joe MD PATIENT NAME: Irina Stratton   DATE/TIME: December 8, 2023 11:53 AM MRN: [id number]

## 2023-12-12 NOTE — PROGRESS NOTES
Consultation:  Subjective     Irina Stratton is a 73 y.o. year old female here for dementia consult. Referred by Guy Liu.    HPI  Arrives with  and daughter, who provides more history.   MRI brain may 2023 personally reviewed - showed hydrocephalus and global atrophy and chronic small vessel disease.   She has Afib, CKD, DM.   The back pain is worsening a lot. She has not walked since about March due to her back.  She had metabolic encephalopathy, lots of pain medication reactions.   Cognitive issues over the past 6 months.   R hand tremor , brief jerking of of extremities.   She has some tightness, abnormal postures.   Has a sacral wound in June, has nursing staff. Has improved.   She has dry mouth, does not have good po intake.    FH: mother had strokes, CAD, CABG.   She used to be a nurse.     She is unable to stand on her own.   Review of Systems    Patient Active Problem List   Diagnosis    Paroxysmal atrial fibrillation (CMS/HCC)    AAA (abdominal aortic aneurysm) (CMS/HCC)    Bacteremia    Bilateral carotid artery stenosis    Carotid bruit    Chronic kidney disease, stage 4 (severe) (CMS/HCC)    Coronary atherosclerosis    Degenerative spondylolisthesis    DM (diabetes mellitus), type 1 (CMS/HCC)    Facet degeneration of lumbar region    H/O heart artery stent    Hypertension    Hyperlipidemia    Hypothyroidism    Lower extremity edema    Lumbar herniated disc    Lumbar radiculopathy    Lumbar stenosis with neurogenic claudication    PAD (peripheral artery disease) (CMS/HCC)    Carotid stenosis    Renal artery stenosis (CMS/HCC)    Saccular aneurysm    Sacroiliitis (CMS/HCC)    Ulcer of right foot (CMS/HCC)    Abnormality of albumin    Acidosis, unspecified    Anemia, unspecified    Benign secondary hypertension due to renal artery stenosis (CMS/HCC)    Cyst of kidney, acquired    Depression, unspecified    Elevated white blood cell count, unspecified    Emphysema, unspecified (CMS/HCC)     Hypokalemia    Insomnia, unspecified    Left bundle-branch block, unspecified    Low back pain, unspecified    Tobacco use    Obstructive and reflux uropathy, unspecified    Occlusion and stenosis of unspecified carotid artery    Other cholelithiasis without obstruction    Pressure ulcer of sacral region, stage 4 (CMS/HCC)    Unspecified osteoarthritis, unspecified site    Unspecified protein-calorie malnutrition (CMS/HCC)    Old myocardial infarction     Past Medical History:   Diagnosis Date    Abnormal radiologic findings on diagnostic imaging of right kidney     Abnormal ultrasound of both kidneys    Personal history of other medical treatment     History of echocardiogram     Past Surgical History:   Procedure Laterality Date     SECTION, CLASSIC  2016     Section    CT ABDOMEN PELVIS ANGIOGRAM W AND/OR WO IV CONTRAST  2019    CT ABDOMEN PELVIS ANGIOGRAM W AND/OR WO IV CONTRAST 2019 [facility]    CT AORTA AND BILATERAL ILIOFEMORAL RUNOFF ANGIOGRAM W AND/OR WO IV CONTRAST  2020    CT AORTA AND BILATERAL ILIOFEMORAL RUNOFF ANGIOGRAM W AND/OR WO IV CONTRAST 2020 [facility]    OTHER SURGICAL HISTORY  2020    History of prior surgery    OTHER SURGICAL HISTORY  2020    Coronary artery stent placement    OTHER SURGICAL HISTORY  2020    Abdominal aortic aneurysm repair endovascular    OTHER SURGICAL HISTORY  2020    Cardiac catheterization    OTHER SURGICAL HISTORY  2019    Renal angioplasty and stenting     Social History     Tobacco Use    Smoking status: Not on file    Smokeless tobacco: Not on file   Substance Use Topics    Alcohol use: Not on file     family history includes No Known Problems in her mother.    Current Outpatient Medications:     acetaminophen (Tylenol) 325 mg tablet, Take 2 tablets (650 mg) by mouth every 4 hours if needed for fever (temp greater than 38.0 C) (Pain)., Disp: , Rfl:     blood sugar  diagnostic (Accu-Chek Guide test strips) strip, Accu-Chek Guide In Vitro Strip  Quantity: 100  Refills: 0      Start : 21-Jul-2020  Active, Disp: , Rfl:     calcitriol (Rocaltrol) 0.25 mcg capsule, Take 1 capsule (0.25 mcg) by mouth. Monday, Wednesday, and Friday, Disp: , Rfl:     cholecalciferol (Vitamin D-3) 25 MCG (1000 UT) capsule, Take 1 capsule (25 mcg) by mouth once daily., Disp: , Rfl:     DULoxetine (Cymbalta) 30 mg DR capsule, Take 1 capsule (30 mg) by mouth once daily., Disp: , Rfl:     gabapentin (Neurontin) 100 mg capsule, Take 1 capsule (100 mg) by mouth 3 times a day., Disp: , Rfl:     glimepiride (Amaryl) 1 mg tablet, Take 1 tablet (1 mg) by mouth once daily in the morning. Take before meals., Disp: , Rfl:     lancets misc, Accu-chek Fastclix Lancets, Disp: , Rfl:     levothyroxine (Synthroid, Levoxyl) 88 mcg tablet, Take 1 tablet (88 mcg) by mouth once daily in the morning. Take before meals., Disp: , Rfl:     metoprolol tartrate (Lopressor) 25 mg tablet, Take 1 tablet (25 mg) by mouth 2 times a day., Disp: , Rfl:     naloxone (Narcan) 4 mg/0.1 mL nasal spray, Administer 1 spray (4 mg) into affected nostril(s) if needed., Disp: , Rfl:     oxyCODONE-acetaminophen (Percocet) 5-325 mg tablet, Take 1 tablet by mouth every 8 hours if needed for severe pain (7 - 10) or moderate pain (4 - 6)., Disp: 90 tablet, Rfl: 0    rivaroxaban (Xarelto) 20 mg tablet, Take 1 tablet (20 mg) by mouth once daily., Disp: , Rfl:     rosuvastatin (Crestor) 40 mg tablet, Take 1 tablet (40 mg) by mouth once daily., Disp: 30 tablet, Rfl: 2    tamsulosin (Flomax) 0.4 mg 24 hr capsule, Take 1 capsule (0.4 mg) by mouth once daily., Disp: , Rfl:   No Known Allergies  There were no vitals taken for this visit.  Neurological Exam/Physical Exam:    Constitutional: General appearance: no acute distress. Pleasant.   Auscultation of Heart: Regular rate and rhythm, no murmurs, normal S1 and S2.   Carotid Arteries: no bruits  Peripheral  Vascular Exam: No swelling, edema or tenderness to palpation in extremities  Memory is impaired. Fluent. Language impaired.   Eyes: The ophthalmoscopic examination was normal.   Cranial nerve II: Visual fields full to confrontation.   Cranial nerves III, IV, and VI: Pupils round, equally reactive to light; EOMs intact. No nystagmus. L eye ptosis.   Cranial Nerve V: Facial sensation intact to LT bilaterally.   Cranial nerve VII: no facial droop  Cranial nerve VIII: Hearing is intact  Cranial nerves IX and X: Palate elevates symmetrically.   Cranial nerve XI: Shoulder shrug intact.   Cranial nerve XII: Tongue is midline.  Motor:  Muscle bulk was normal in both upper and lower extremities.     Atrophy in all extremities. Right side dystonic posturing.right resting tremor is mild with Right hand flexed.   Rigidity and spasticity in all ext, worse on R.   Deep Tendon Reflexes: left biceps 2+ , right biceps 2+, left triceps 2+, right triceps 2+, left brachioradialis 2+, right brachioradialis 2+, left patella 2+, right patella 2+, left ankle jerk 2+, right ankle jerk 2+   Plantar Reflex: Toes downgoing to plantar stimulation on the left. Toes downgoing to plantar stimulation on the right.   Sensory Exam: Normal to vibratory sensation  Coordination:  impaired in all ext, mostly on R side   Gait:  nonambulatory.          Labs:  CBC:   Lab Results   Component Value Date    WBC 9.2 07/24/2023    HGB 11.2 (L) 07/24/2023    HCT 39.1 07/24/2023     07/24/2023     BMP:   Lab Results   Component Value Date     07/24/2023    K 4.3 07/24/2023     07/24/2023    CO2 21 07/24/2023    BUN 13 07/24/2023    CREATININE 0.76 07/24/2023    CALCIUM 9.2 07/24/2023    MG 2.48 (H) 07/24/2023    PHOS 1.7 (L) 12/06/2022     LFT:   Lab Results   Component Value Date    ALKPHOS 93 07/24/2023    BILITOT 0.5 07/24/2023    BILIDIR 0.1 10/12/2018    PROT 8.2 07/24/2023    ALBUMIN 3.4 07/24/2023    ALT 13 07/24/2023    AST 22 07/24/2023      B12 and TSH were WNL.   Assessment/Plan   Problem List Items Addressed This Visit    None  Visit Diagnoses         Codes    Moderate dementia with other behavioral disturbance, unspecified dementia type (CMS/HCC)    -  Primary F03.B18         Probable vascular dementia, may be component of Alzheimer's disease as well. Has some signs of parkinsonism resting tremor and dystonic posturing in R arm. May be FTLD / ALS vs Corticobasal degeneration.   Pain meds may be contributing to worsening confusion.  Pain itself can worsen confusion.   No driving. More supervision advised.   Myoclonus - may be kidney disease vs gabapentin vs dementia. '    Baclofen vs botox for spasticity.

## 2023-12-12 NOTE — PATIENT INSTRUCTIONS
"It was a pleasure seeing you today.     I would like you to start a medication called Sinemet (carbidopa-levadopa) 25-100mg start with 1/2 tab three times daily with meals (or small meal) x 7 days, then 1 tab three times daily and continue.  Some potential adverse effects are dizziness, nausea, confusion, or hallucinations-however, unlikely to occur at small doses like this.    I want you to get an EMG/nerve conduction test.  Please schedule this for yourself by calling [phone].    This will help evaluate how well your nerves and muscles function.  I will call you if there is anything abnormal.      follow up appointment in 4 months. Phone visit in 4-5 weeks.     For any urgent issues or needing to speak to a medical assistant please call [phone], option 6 during our office hours Monday-Friday 8am-4pm, and leave a voicemail with your concern.  My office will try to reach back you as soon as possible within 24 (business) hours.  If you have an emergency please call 911 or visit a local urgent care or nearest emergency room.      Please understand that [employer] is a useful communication tool for simple \"normal\" results or a refill request but I would not recommend using this tool for emergent or urgent issues or for conversations with me.  I am happy to ask my staff to rearrange a follow up visit or a virtual visit sooner than requested if appropriate for your care.     "

## 2024-01-01 ENCOUNTER — OFFICE VISIT (OUTPATIENT)
Dept: NEUROSURGERY | Facility: CLINIC | Age: 74
End: 2024-01-01
Payer: MEDICARE

## 2024-01-01 ENCOUNTER — HOSPITAL ENCOUNTER (OUTPATIENT)
Dept: CARDIOLOGY | Facility: HOSPITAL | Age: 74
Discharge: HOME | End: 2024-03-07
Payer: MEDICARE

## 2024-01-01 ENCOUNTER — HOSPITAL ENCOUNTER (INPATIENT)
Facility: HOSPITAL | Age: 74
LOS: 12 days | Discharge: HOME | DRG: 698 | End: 2024-03-07
Attending: STUDENT IN AN ORGANIZED HEALTH CARE EDUCATION/TRAINING PROGRAM | Admitting: INTERNAL MEDICINE
Payer: MEDICARE

## 2024-01-01 ENCOUNTER — APPOINTMENT (OUTPATIENT)
Dept: RADIOLOGY | Facility: HOSPITAL | Age: 74
DRG: 698 | End: 2024-01-01
Payer: MEDICARE

## 2024-01-01 ENCOUNTER — OFFICE VISIT (OUTPATIENT)
Dept: VASCULAR SURGERY | Facility: CLINIC | Age: 74
End: 2024-01-01
Payer: MEDICARE

## 2024-01-01 ENCOUNTER — HOSPITAL ENCOUNTER (EMERGENCY)
Facility: HOSPITAL | Age: 74
End: 2024-03-23
Attending: STUDENT IN AN ORGANIZED HEALTH CARE EDUCATION/TRAINING PROGRAM
Payer: MEDICARE

## 2024-01-01 ENCOUNTER — ANESTHESIA (OUTPATIENT)
Dept: GASTROENTEROLOGY | Facility: HOSPITAL | Age: 74
DRG: 698 | End: 2024-01-01
Payer: MEDICARE

## 2024-01-01 ENCOUNTER — HOSPITAL ENCOUNTER (INPATIENT)
Facility: HOSPITAL | Age: 74
LOS: 3 days | Discharge: NURSING, REHAB OR LONG-TERM CARE | DRG: 637 | End: 2024-01-19
Attending: EMERGENCY MEDICINE | Admitting: INTERNAL MEDICINE
Payer: MEDICARE

## 2024-01-01 ENCOUNTER — PREP FOR PROCEDURE (OUTPATIENT)
Dept: SURGERY | Facility: HOSPITAL | Age: 74
End: 2024-01-01
Payer: MEDICARE

## 2024-01-01 ENCOUNTER — APPOINTMENT (OUTPATIENT)
Dept: PAIN MEDICINE | Facility: CLINIC | Age: 74
End: 2024-01-01
Payer: MEDICARE

## 2024-01-01 ENCOUNTER — APPOINTMENT (OUTPATIENT)
Dept: NEUROLOGY | Facility: CLINIC | Age: 74
End: 2024-01-01
Payer: MEDICARE

## 2024-01-01 ENCOUNTER — APPOINTMENT (OUTPATIENT)
Dept: GASTROENTEROLOGY | Facility: HOSPITAL | Age: 74
DRG: 698 | End: 2024-01-01
Payer: MEDICARE

## 2024-01-01 ENCOUNTER — APPOINTMENT (OUTPATIENT)
Dept: RADIOLOGY | Facility: HOSPITAL | Age: 74
DRG: 637 | End: 2024-01-01
Payer: MEDICARE

## 2024-01-01 ENCOUNTER — APPOINTMENT (OUTPATIENT)
Dept: WOUND CARE | Facility: CLINIC | Age: 74
End: 2024-01-01
Payer: MEDICARE

## 2024-01-01 ENCOUNTER — OFFICE VISIT (OUTPATIENT)
Dept: WOUND CARE | Facility: CLINIC | Age: 74
End: 2024-01-01
Payer: MEDICARE

## 2024-01-01 ENCOUNTER — ANESTHESIA EVENT (OUTPATIENT)
Dept: GASTROENTEROLOGY | Facility: HOSPITAL | Age: 74
DRG: 698 | End: 2024-01-01
Payer: MEDICARE

## 2024-01-01 ENCOUNTER — LAB REQUISITION (OUTPATIENT)
Dept: LAB | Facility: HOSPITAL | Age: 74
End: 2024-01-01
Payer: MEDICARE

## 2024-01-01 ENCOUNTER — OFFICE VISIT (OUTPATIENT)
Dept: PAIN MEDICINE | Facility: CLINIC | Age: 74
End: 2024-01-01
Payer: MEDICARE

## 2024-01-01 ENCOUNTER — APPOINTMENT (OUTPATIENT)
Dept: CARDIOLOGY | Facility: HOSPITAL | Age: 74
DRG: 698 | End: 2024-01-01
Payer: MEDICARE

## 2024-01-01 ENCOUNTER — HOSPITAL ENCOUNTER (OUTPATIENT)
Dept: NEUROLOGY | Facility: HOSPITAL | Age: 74
Discharge: HOME | DRG: 698 | End: 2024-02-23
Payer: MEDICARE

## 2024-01-01 ENCOUNTER — TELEMEDICINE (OUTPATIENT)
Dept: NEUROLOGY | Facility: CLINIC | Age: 74
End: 2024-01-01
Payer: MEDICARE

## 2024-01-01 VITALS
SYSTOLIC BLOOD PRESSURE: 149 MMHG | WEIGHT: 108 LBS | OXYGEN SATURATION: 95 % | BODY MASS INDEX: 17.44 KG/M2 | HEART RATE: 98 BPM | TEMPERATURE: 97.7 F | DIASTOLIC BLOOD PRESSURE: 88 MMHG

## 2024-01-01 VITALS
OXYGEN SATURATION: 97 % | DIASTOLIC BLOOD PRESSURE: 64 MMHG | HEIGHT: 65 IN | SYSTOLIC BLOOD PRESSURE: 110 MMHG | HEART RATE: 72 BPM | WEIGHT: 105 LBS | BODY MASS INDEX: 17.49 KG/M2

## 2024-01-01 VITALS
OXYGEN SATURATION: 94 % | RESPIRATION RATE: 18 BRPM | HEART RATE: 88 BPM | BODY MASS INDEX: 18.78 KG/M2 | TEMPERATURE: 97.9 F | SYSTOLIC BLOOD PRESSURE: 128 MMHG | WEIGHT: 116.84 LBS | DIASTOLIC BLOOD PRESSURE: 76 MMHG | HEIGHT: 66 IN

## 2024-01-01 VITALS
HEIGHT: 65 IN | RESPIRATION RATE: 16 BRPM | HEART RATE: 76 BPM | OXYGEN SATURATION: 96 % | DIASTOLIC BLOOD PRESSURE: 59 MMHG | BODY MASS INDEX: 17.48 KG/M2 | TEMPERATURE: 97 F | WEIGHT: 104.94 LBS | SYSTOLIC BLOOD PRESSURE: 122 MMHG

## 2024-01-01 VITALS
HEIGHT: 65 IN | SYSTOLIC BLOOD PRESSURE: 126 MMHG | WEIGHT: 105 LBS | HEART RATE: 71 BPM | BODY MASS INDEX: 17.49 KG/M2 | DIASTOLIC BLOOD PRESSURE: 71 MMHG | RESPIRATION RATE: 18 BRPM

## 2024-01-01 VITALS — RESPIRATION RATE: 101 BRPM | HEART RATE: 161 BPM

## 2024-01-01 DIAGNOSIS — L89.154 PRESSURE ULCER OF SACRAL REGION, STAGE 4 (MULTI): ICD-10-CM

## 2024-01-01 DIAGNOSIS — R64 CACHEXIA (MULTI): ICD-10-CM

## 2024-01-01 DIAGNOSIS — I24.9 ACS (ACUTE CORONARY SYNDROME) (MULTI): ICD-10-CM

## 2024-01-01 DIAGNOSIS — N28.1 CYST OF KIDNEY, ACQUIRED: ICD-10-CM

## 2024-01-01 DIAGNOSIS — T83.511A URINARY TRACT INFECTION ASSOCIATED WITH INDWELLING URETHRAL CATHETER, INITIAL ENCOUNTER (CMS-HCC): Primary | ICD-10-CM

## 2024-01-01 DIAGNOSIS — R79.89 ELEVATED BRAIN NATRIURETIC PEPTIDE (BNP) LEVEL: ICD-10-CM

## 2024-01-01 DIAGNOSIS — A41.9 SEPSIS, DUE TO UNSPECIFIED ORGANISM, UNSPECIFIED WHETHER ACUTE ORGAN DYSFUNCTION PRESENT (MULTI): ICD-10-CM

## 2024-01-01 DIAGNOSIS — L89.221 PRESSURE ULCER OF LEFT HIP, STAGE 1: ICD-10-CM

## 2024-01-01 DIAGNOSIS — G20.C ATYPICAL PARKINSONISM (MULTI): Primary | ICD-10-CM

## 2024-01-01 DIAGNOSIS — M48.062 LUMBAR STENOSIS WITH NEUROGENIC CLAUDICATION: ICD-10-CM

## 2024-01-01 DIAGNOSIS — T83.511A URINARY TRACT INFECTION ASSOCIATED WITH INDWELLING URETHRAL CATHETER, INITIAL ENCOUNTER (CMS-HCC): ICD-10-CM

## 2024-01-01 DIAGNOSIS — M48.062 LUMBAR STENOSIS WITH NEUROGENIC CLAUDICATION: Primary | ICD-10-CM

## 2024-01-01 DIAGNOSIS — E43 SEVERE PROTEIN-CALORIE MALNUTRITION (MULTI): ICD-10-CM

## 2024-01-01 DIAGNOSIS — A41.9 SEPSIS WITHOUT ACUTE ORGAN DYSFUNCTION, DUE TO UNSPECIFIED ORGANISM (MULTI): ICD-10-CM

## 2024-01-01 DIAGNOSIS — M54.16 LUMBAR RADICULOPATHY: ICD-10-CM

## 2024-01-01 DIAGNOSIS — M62.81 MUSCLE WEAKNESS: ICD-10-CM

## 2024-01-01 DIAGNOSIS — N39.0 URINARY TRACT INFECTION ASSOCIATED WITH INDWELLING URETHRAL CATHETER, INITIAL ENCOUNTER (CMS-HCC): ICD-10-CM

## 2024-01-01 DIAGNOSIS — L89.313 PRESSURE ULCER OF RIGHT BUTTOCK, STAGE 3 (MULTI): ICD-10-CM

## 2024-01-01 DIAGNOSIS — I67.1 SACCULAR ANEURYSM (HHS-HCC): ICD-10-CM

## 2024-01-01 DIAGNOSIS — N39.0 URINARY TRACT INFECTION ASSOCIATED WITH INDWELLING URETHRAL CATHETER, INITIAL ENCOUNTER (CMS-HCC): Primary | ICD-10-CM

## 2024-01-01 DIAGNOSIS — L89.893 PRESSURE ULCER OF OTHER SITE, STAGE 3 (MULTI): ICD-10-CM

## 2024-01-01 DIAGNOSIS — I46.9 CARDIAC ARREST: Primary | ICD-10-CM

## 2024-01-01 DIAGNOSIS — I71.40 ABDOMINAL AORTIC ANEURYSM (AAA) WITHOUT RUPTURE, UNSPECIFIED PART (CMS-HCC): Primary | ICD-10-CM

## 2024-01-01 DIAGNOSIS — E46 UNSPECIFIED PROTEIN-CALORIE MALNUTRITION (MULTI): ICD-10-CM

## 2024-01-01 DIAGNOSIS — G93.40 ACUTE ENCEPHALOPATHY: ICD-10-CM

## 2024-01-01 DIAGNOSIS — M46.28 SACRAL OSTEOMYELITIS (MULTI): Primary | ICD-10-CM

## 2024-01-01 DIAGNOSIS — I48.0 PAROXYSMAL ATRIAL FIBRILLATION (MULTI): ICD-10-CM

## 2024-01-01 DIAGNOSIS — M43.10 DEGENERATIVE SPONDYLOLISTHESIS: ICD-10-CM

## 2024-01-01 LAB
ALBUMIN SERPL BCP-MCNC: 2.9 G/DL (ref 3.4–5)
ALBUMIN SERPL BCP-MCNC: 3.1 G/DL (ref 3.4–5)
ALBUMIN SERPL BCP-MCNC: 3.2 G/DL (ref 3.4–5)
ALBUMIN SERPL BCP-MCNC: 3.3 G/DL (ref 3.4–5)
ALBUMIN SERPL BCP-MCNC: 3.4 G/DL (ref 3.4–5)
ALP SERPL-CCNC: 102 U/L (ref 33–136)
ALP SERPL-CCNC: 70 U/L (ref 33–136)
ALP SERPL-CCNC: 73 U/L (ref 33–136)
ALP SERPL-CCNC: 73 U/L (ref 33–136)
ALP SERPL-CCNC: 74 U/L (ref 33–136)
ALP SERPL-CCNC: 75 U/L (ref 33–136)
ALP SERPL-CCNC: 76 U/L (ref 33–136)
ALP SERPL-CCNC: 78 U/L (ref 33–136)
ALP SERPL-CCNC: 88 U/L (ref 33–136)
ALP SERPL-CCNC: 90 U/L (ref 33–136)
ALT SERPL W P-5'-P-CCNC: 11 U/L (ref 7–45)
ALT SERPL W P-5'-P-CCNC: 18 U/L (ref 7–45)
ALT SERPL W P-5'-P-CCNC: 21 U/L (ref 7–45)
ALT SERPL W P-5'-P-CCNC: 38 U/L (ref 7–45)
ALT SERPL W P-5'-P-CCNC: 4 U/L (ref 7–45)
ALT SERPL W P-5'-P-CCNC: 5 U/L (ref 7–45)
ALT SERPL W P-5'-P-CCNC: 7 U/L (ref 7–45)
ALT SERPL W P-5'-P-CCNC: 8 U/L (ref 7–45)
ALT SERPL W P-5'-P-CCNC: 9 U/L (ref 7–45)
ALT SERPL W P-5'-P-CCNC: 9 U/L (ref 7–45)
ANION GAP BLDV CALCULATED.4IONS-SCNC: 10 MMOL/L (ref 10–25)
ANION GAP SERPL CALC-SCNC: 11 MMOL/L (ref 10–20)
ANION GAP SERPL CALC-SCNC: 12 MMOL/L (ref 10–20)
ANION GAP SERPL CALC-SCNC: 12 MMOL/L (ref 10–20)
ANION GAP SERPL CALC-SCNC: 13 MMOL/L (ref 10–20)
ANION GAP SERPL CALC-SCNC: 14 MMOL/L (ref 10–20)
ANION GAP SERPL CALC-SCNC: 15 MMOL/L (ref 10–20)
ANION GAP SERPL CALC-SCNC: 16 MMOL/L (ref 10–20)
AORTIC VALVE MEAN GRADIENT: 6 MMHG
AORTIC VALVE PEAK VELOCITY: 1.68 M/S
APPEARANCE UR: ABNORMAL
APPEARANCE UR: ABNORMAL
AST SERPL W P-5'-P-CCNC: 122 U/L (ref 9–39)
AST SERPL W P-5'-P-CCNC: 21 U/L (ref 9–39)
AST SERPL W P-5'-P-CCNC: 21 U/L (ref 9–39)
AST SERPL W P-5'-P-CCNC: 22 U/L (ref 9–39)
AST SERPL W P-5'-P-CCNC: 23 U/L (ref 9–39)
AST SERPL W P-5'-P-CCNC: 23 U/L (ref 9–39)
AST SERPL W P-5'-P-CCNC: 25 U/L (ref 9–39)
AST SERPL W P-5'-P-CCNC: 26 U/L (ref 9–39)
AST SERPL W P-5'-P-CCNC: 27 U/L (ref 9–39)
AST SERPL W P-5'-P-CCNC: 44 U/L (ref 9–39)
ATRIAL RATE: 84 BPM
AV PEAK GRADIENT: 11.3 MMHG
AVA (PEAK VEL): 1.42 CM2
AVA (VTI): 1.44 CM2
BACTERIA #/AREA URNS AUTO: ABNORMAL /HPF
BACTERIA #/AREA URNS AUTO: ABNORMAL /HPF
BACTERIA BLD CULT: NORMAL
BACTERIA SPEC CULT: ABNORMAL
BACTERIA SPEC CULT: ABNORMAL
BACTERIA UR CULT: ABNORMAL
BACTERIA UR CULT: ABNORMAL
BASE EXCESS BLDV CALC-SCNC: 3.6 MMOL/L (ref -2–3)
BASOPHILS # BLD AUTO: 0.04 X10*3/UL (ref 0–0.1)
BASOPHILS # BLD AUTO: 0.04 X10*3/UL (ref 0–0.1)
BASOPHILS NFR BLD AUTO: 0.2 %
BASOPHILS NFR BLD AUTO: 0.3 %
BILIRUB SERPL-MCNC: 0.4 MG/DL (ref 0–1.2)
BILIRUB SERPL-MCNC: 0.5 MG/DL (ref 0–1.2)
BILIRUB SERPL-MCNC: 0.5 MG/DL (ref 0–1.2)
BILIRUB SERPL-MCNC: 0.6 MG/DL (ref 0–1.2)
BILIRUB SERPL-MCNC: 0.6 MG/DL (ref 0–1.2)
BILIRUB SERPL-MCNC: 0.7 MG/DL (ref 0–1.2)
BILIRUB SERPL-MCNC: 0.8 MG/DL (ref 0–1.2)
BILIRUB SERPL-MCNC: 1 MG/DL (ref 0–1.2)
BILIRUB UR STRIP.AUTO-MCNC: NEGATIVE MG/DL
BILIRUB UR STRIP.AUTO-MCNC: NEGATIVE MG/DL
BNP SERPL-MCNC: 390 PG/ML (ref 0–99)
BODY TEMPERATURE: 37 DEGREES CELSIUS
BUN SERPL-MCNC: 19 MG/DL (ref 6–23)
BUN SERPL-MCNC: 20 MG/DL (ref 6–23)
BUN SERPL-MCNC: 20 MG/DL (ref 6–23)
BUN SERPL-MCNC: 22 MG/DL (ref 6–23)
BUN SERPL-MCNC: 29 MG/DL (ref 6–23)
BUN SERPL-MCNC: 29 MG/DL (ref 6–23)
BUN SERPL-MCNC: 32 MG/DL (ref 6–23)
BUN SERPL-MCNC: 34 MG/DL (ref 6–23)
BUN SERPL-MCNC: 37 MG/DL (ref 6–23)
BUN SERPL-MCNC: 42 MG/DL (ref 6–23)
BUN SERPL-MCNC: 44 MG/DL (ref 6–23)
BUN SERPL-MCNC: 48 MG/DL (ref 6–23)
CA-I BLDV-SCNC: 1.14 MMOL/L (ref 1.1–1.33)
CALCIUM SERPL-MCNC: 8 MG/DL (ref 8.6–10.3)
CALCIUM SERPL-MCNC: 8.4 MG/DL (ref 8.6–10.3)
CALCIUM SERPL-MCNC: 8.4 MG/DL (ref 8.6–10.3)
CALCIUM SERPL-MCNC: 8.5 MG/DL (ref 8.6–10.3)
CALCIUM SERPL-MCNC: 8.7 MG/DL (ref 8.6–10.3)
CALCIUM SERPL-MCNC: 8.8 MG/DL (ref 8.6–10.3)
CALCIUM SERPL-MCNC: 8.8 MG/DL (ref 8.6–10.3)
CALCIUM SERPL-MCNC: 8.9 MG/DL (ref 8.6–10.3)
CALCIUM SERPL-MCNC: 9.1 MG/DL (ref 8.6–10.3)
CALCIUM SERPL-MCNC: 9.2 MG/DL (ref 8.6–10.3)
CALCIUM SERPL-MCNC: 9.3 MG/DL (ref 8.6–10.3)
CALCIUM SERPL-MCNC: 9.5 MG/DL (ref 8.6–10.3)
CARDIAC TROPONIN I PNL SERPL HS: 13 NG/L (ref 0–13)
CHLORIDE BLDV-SCNC: 115 MMOL/L (ref 98–107)
CHLORIDE SERPL-SCNC: 100 MMOL/L (ref 98–107)
CHLORIDE SERPL-SCNC: 103 MMOL/L (ref 98–107)
CHLORIDE SERPL-SCNC: 105 MMOL/L (ref 98–107)
CHLORIDE SERPL-SCNC: 107 MMOL/L (ref 98–107)
CHLORIDE SERPL-SCNC: 112 MMOL/L (ref 98–107)
CHLORIDE SERPL-SCNC: 112 MMOL/L (ref 98–107)
CHLORIDE SERPL-SCNC: 115 MMOL/L (ref 98–107)
CHLORIDE SERPL-SCNC: 115 MMOL/L (ref 98–107)
CO2 SERPL-SCNC: 22 MMOL/L (ref 21–32)
CO2 SERPL-SCNC: 23 MMOL/L (ref 21–32)
CO2 SERPL-SCNC: 24 MMOL/L (ref 21–32)
CO2 SERPL-SCNC: 27 MMOL/L (ref 21–32)
COLOR UR: ABNORMAL
COLOR UR: YELLOW
CREAT SERPL-MCNC: 0.66 MG/DL (ref 0.5–1.05)
CREAT SERPL-MCNC: 0.67 MG/DL (ref 0.5–1.05)
CREAT SERPL-MCNC: 0.8 MG/DL (ref 0.5–1.05)
CREAT SERPL-MCNC: 0.82 MG/DL (ref 0.5–1.05)
CREAT SERPL-MCNC: 0.87 MG/DL (ref 0.5–1.05)
CREAT SERPL-MCNC: 0.9 MG/DL (ref 0.5–1.05)
CREAT SERPL-MCNC: 0.92 MG/DL (ref 0.5–1.05)
CREAT SERPL-MCNC: 0.92 MG/DL (ref 0.5–1.05)
CREAT SERPL-MCNC: 0.95 MG/DL (ref 0.5–1.05)
CREAT SERPL-MCNC: 0.96 MG/DL (ref 0.5–1.05)
CREAT SERPL-MCNC: 1.01 MG/DL (ref 0.5–1.05)
CREAT SERPL-MCNC: 1.03 MG/DL (ref 0.5–1.05)
CREAT SERPL-MCNC: 1.09 MG/DL (ref 0.5–1.05)
CREAT SERPL-MCNC: 1.09 MG/DL (ref 0.5–1.05)
EGFRCR SERPLBLD CKD-EPI 2021: 54 ML/MIN/1.73M*2
EGFRCR SERPLBLD CKD-EPI 2021: 54 ML/MIN/1.73M*2
EGFRCR SERPLBLD CKD-EPI 2021: 58 ML/MIN/1.73M*2
EGFRCR SERPLBLD CKD-EPI 2021: 59 ML/MIN/1.73M*2
EGFRCR SERPLBLD CKD-EPI 2021: 63 ML/MIN/1.73M*2
EGFRCR SERPLBLD CKD-EPI 2021: 63 ML/MIN/1.73M*2
EGFRCR SERPLBLD CKD-EPI 2021: 66 ML/MIN/1.73M*2
EGFRCR SERPLBLD CKD-EPI 2021: 66 ML/MIN/1.73M*2
EGFRCR SERPLBLD CKD-EPI 2021: 68 ML/MIN/1.73M*2
EGFRCR SERPLBLD CKD-EPI 2021: 70 ML/MIN/1.73M*2
EGFRCR SERPLBLD CKD-EPI 2021: 76 ML/MIN/1.73M*2
EGFRCR SERPLBLD CKD-EPI 2021: 78 ML/MIN/1.73M*2
EGFRCR SERPLBLD CKD-EPI 2021: >90 ML/MIN/1.73M*2
EGFRCR SERPLBLD CKD-EPI 2021: >90 ML/MIN/1.73M*2
EJECTION FRACTION APICAL 4 CHAMBER: 52
EJECTION FRACTION: 50 %
EOSINOPHIL # BLD AUTO: 0.04 X10*3/UL (ref 0–0.4)
EOSINOPHIL # BLD AUTO: 0.05 X10*3/UL (ref 0–0.4)
EOSINOPHIL NFR BLD AUTO: 0.3 %
EOSINOPHIL NFR BLD AUTO: 0.3 %
ERYTHROCYTE [DISTWIDTH] IN BLOOD BY AUTOMATED COUNT: 18.1 % (ref 11.5–14.5)
ERYTHROCYTE [DISTWIDTH] IN BLOOD BY AUTOMATED COUNT: 18.5 % (ref 11.5–14.5)
ERYTHROCYTE [DISTWIDTH] IN BLOOD BY AUTOMATED COUNT: 18.6 % (ref 11.5–14.5)
ERYTHROCYTE [DISTWIDTH] IN BLOOD BY AUTOMATED COUNT: 18.8 % (ref 11.5–14.5)
ERYTHROCYTE [DISTWIDTH] IN BLOOD BY AUTOMATED COUNT: 18.8 % (ref 11.5–14.5)
ERYTHROCYTE [DISTWIDTH] IN BLOOD BY AUTOMATED COUNT: 18.9 % (ref 11.5–14.5)
ERYTHROCYTE [DISTWIDTH] IN BLOOD BY AUTOMATED COUNT: 19.1 % (ref 11.5–14.5)
ERYTHROCYTE [DISTWIDTH] IN BLOOD BY AUTOMATED COUNT: 19.4 % (ref 11.5–14.5)
ERYTHROCYTE [DISTWIDTH] IN BLOOD BY AUTOMATED COUNT: 19.6 % (ref 11.5–14.5)
ERYTHROCYTE [DISTWIDTH] IN BLOOD BY AUTOMATED COUNT: 19.6 % (ref 11.5–14.5)
FLUAV RNA RESP QL NAA+PROBE: NOT DETECTED
FLUBV RNA RESP QL NAA+PROBE: NOT DETECTED
GLUCOSE BLD MANUAL STRIP-MCNC: 102 MG/DL (ref 74–99)
GLUCOSE BLD MANUAL STRIP-MCNC: 104 MG/DL (ref 74–99)
GLUCOSE BLD MANUAL STRIP-MCNC: 104 MG/DL (ref 74–99)
GLUCOSE BLD MANUAL STRIP-MCNC: 105 MG/DL (ref 74–99)
GLUCOSE BLD MANUAL STRIP-MCNC: 107 MG/DL (ref 74–99)
GLUCOSE BLD MANUAL STRIP-MCNC: 108 MG/DL (ref 74–99)
GLUCOSE BLD MANUAL STRIP-MCNC: 110 MG/DL (ref 74–99)
GLUCOSE BLD MANUAL STRIP-MCNC: 110 MG/DL (ref 74–99)
GLUCOSE BLD MANUAL STRIP-MCNC: 111 MG/DL (ref 74–99)
GLUCOSE BLD MANUAL STRIP-MCNC: 111 MG/DL (ref 74–99)
GLUCOSE BLD MANUAL STRIP-MCNC: 112 MG/DL (ref 74–99)
GLUCOSE BLD MANUAL STRIP-MCNC: 114 MG/DL (ref 74–99)
GLUCOSE BLD MANUAL STRIP-MCNC: 114 MG/DL (ref 74–99)
GLUCOSE BLD MANUAL STRIP-MCNC: 115 MG/DL (ref 74–99)
GLUCOSE BLD MANUAL STRIP-MCNC: 118 MG/DL (ref 74–99)
GLUCOSE BLD MANUAL STRIP-MCNC: 123 MG/DL (ref 74–99)
GLUCOSE BLD MANUAL STRIP-MCNC: 124 MG/DL (ref 74–99)
GLUCOSE BLD MANUAL STRIP-MCNC: 125 MG/DL (ref 74–99)
GLUCOSE BLD MANUAL STRIP-MCNC: 126 MG/DL (ref 74–99)
GLUCOSE BLD MANUAL STRIP-MCNC: 127 MG/DL (ref 74–99)
GLUCOSE BLD MANUAL STRIP-MCNC: 127 MG/DL (ref 74–99)
GLUCOSE BLD MANUAL STRIP-MCNC: 128 MG/DL (ref 74–99)
GLUCOSE BLD MANUAL STRIP-MCNC: 129 MG/DL (ref 74–99)
GLUCOSE BLD MANUAL STRIP-MCNC: 132 MG/DL (ref 74–99)
GLUCOSE BLD MANUAL STRIP-MCNC: 134 MG/DL (ref 74–99)
GLUCOSE BLD MANUAL STRIP-MCNC: 134 MG/DL (ref 74–99)
GLUCOSE BLD MANUAL STRIP-MCNC: 136 MG/DL (ref 74–99)
GLUCOSE BLD MANUAL STRIP-MCNC: 143 MG/DL (ref 74–99)
GLUCOSE BLD MANUAL STRIP-MCNC: 145 MG/DL (ref 74–99)
GLUCOSE BLD MANUAL STRIP-MCNC: 146 MG/DL (ref 74–99)
GLUCOSE BLD MANUAL STRIP-MCNC: 148 MG/DL (ref 74–99)
GLUCOSE BLD MANUAL STRIP-MCNC: 149 MG/DL (ref 74–99)
GLUCOSE BLD MANUAL STRIP-MCNC: 149 MG/DL (ref 74–99)
GLUCOSE BLD MANUAL STRIP-MCNC: 150 MG/DL (ref 74–99)
GLUCOSE BLD MANUAL STRIP-MCNC: 155 MG/DL (ref 74–99)
GLUCOSE BLD MANUAL STRIP-MCNC: 159 MG/DL (ref 74–99)
GLUCOSE BLD MANUAL STRIP-MCNC: 159 MG/DL (ref 74–99)
GLUCOSE BLD MANUAL STRIP-MCNC: 160 MG/DL (ref 74–99)
GLUCOSE BLD MANUAL STRIP-MCNC: 161 MG/DL (ref 74–99)
GLUCOSE BLD MANUAL STRIP-MCNC: 166 MG/DL (ref 74–99)
GLUCOSE BLD MANUAL STRIP-MCNC: 181 MG/DL (ref 74–99)
GLUCOSE BLD MANUAL STRIP-MCNC: 181 MG/DL (ref 74–99)
GLUCOSE BLD MANUAL STRIP-MCNC: 183 MG/DL (ref 74–99)
GLUCOSE BLD MANUAL STRIP-MCNC: 185 MG/DL (ref 74–99)
GLUCOSE BLD MANUAL STRIP-MCNC: 186 MG/DL (ref 74–99)
GLUCOSE BLD MANUAL STRIP-MCNC: 192 MG/DL (ref 74–99)
GLUCOSE BLD MANUAL STRIP-MCNC: 197 MG/DL (ref 74–99)
GLUCOSE BLD MANUAL STRIP-MCNC: 202 MG/DL (ref 74–99)
GLUCOSE BLD MANUAL STRIP-MCNC: 210 MG/DL (ref 74–99)
GLUCOSE BLD MANUAL STRIP-MCNC: 223 MG/DL (ref 74–99)
GLUCOSE BLD MANUAL STRIP-MCNC: 235 MG/DL (ref 74–99)
GLUCOSE BLD MANUAL STRIP-MCNC: 321 MG/DL (ref 74–99)
GLUCOSE BLD MANUAL STRIP-MCNC: 76 MG/DL (ref 74–99)
GLUCOSE BLD MANUAL STRIP-MCNC: 77 MG/DL (ref 74–99)
GLUCOSE BLD MANUAL STRIP-MCNC: 77 MG/DL (ref 74–99)
GLUCOSE BLD MANUAL STRIP-MCNC: 78 MG/DL (ref 74–99)
GLUCOSE BLD MANUAL STRIP-MCNC: 79 MG/DL (ref 74–99)
GLUCOSE BLD MANUAL STRIP-MCNC: 79 MG/DL (ref 74–99)
GLUCOSE BLD MANUAL STRIP-MCNC: 82 MG/DL (ref 74–99)
GLUCOSE BLD MANUAL STRIP-MCNC: 82 MG/DL (ref 74–99)
GLUCOSE BLD MANUAL STRIP-MCNC: 84 MG/DL (ref 74–99)
GLUCOSE BLD MANUAL STRIP-MCNC: 84 MG/DL (ref 74–99)
GLUCOSE BLD MANUAL STRIP-MCNC: 85 MG/DL (ref 74–99)
GLUCOSE BLD MANUAL STRIP-MCNC: 87 MG/DL (ref 74–99)
GLUCOSE BLD MANUAL STRIP-MCNC: 87 MG/DL (ref 74–99)
GLUCOSE BLD MANUAL STRIP-MCNC: 89 MG/DL (ref 74–99)
GLUCOSE BLD MANUAL STRIP-MCNC: 92 MG/DL (ref 74–99)
GLUCOSE BLD MANUAL STRIP-MCNC: 92 MG/DL (ref 74–99)
GLUCOSE BLD MANUAL STRIP-MCNC: 97 MG/DL (ref 74–99)
GLUCOSE BLD MANUAL STRIP-MCNC: 98 MG/DL (ref 74–99)
GLUCOSE BLD MANUAL STRIP-MCNC: 99 MG/DL (ref 74–99)
GLUCOSE BLDV-MCNC: 249 MG/DL (ref 74–99)
GLUCOSE SERPL-MCNC: 101 MG/DL (ref 74–99)
GLUCOSE SERPL-MCNC: 104 MG/DL (ref 74–99)
GLUCOSE SERPL-MCNC: 107 MG/DL (ref 74–99)
GLUCOSE SERPL-MCNC: 118 MG/DL (ref 74–99)
GLUCOSE SERPL-MCNC: 120 MG/DL (ref 74–99)
GLUCOSE SERPL-MCNC: 126 MG/DL (ref 74–99)
GLUCOSE SERPL-MCNC: 138 MG/DL (ref 74–99)
GLUCOSE SERPL-MCNC: 139 MG/DL (ref 74–99)
GLUCOSE SERPL-MCNC: 166 MG/DL (ref 74–99)
GLUCOSE SERPL-MCNC: 216 MG/DL (ref 74–99)
GLUCOSE SERPL-MCNC: 69 MG/DL (ref 74–99)
GLUCOSE SERPL-MCNC: 79 MG/DL (ref 74–99)
GLUCOSE SERPL-MCNC: 80 MG/DL (ref 74–99)
GLUCOSE SERPL-MCNC: 96 MG/DL (ref 74–99)
GLUCOSE UR STRIP.AUTO-MCNC: NEGATIVE MG/DL
GLUCOSE UR STRIP.AUTO-MCNC: NEGATIVE MG/DL
GRAM STN SPEC: ABNORMAL
GRAM STN SPEC: ABNORMAL
HCO3 BLDV-SCNC: 25.7 MMOL/L (ref 22–26)
HCT VFR BLD AUTO: 32.8 % (ref 36–46)
HCT VFR BLD AUTO: 33.2 % (ref 36–46)
HCT VFR BLD AUTO: 34.4 % (ref 36–46)
HCT VFR BLD AUTO: 34.9 % (ref 36–46)
HCT VFR BLD AUTO: 34.9 % (ref 36–46)
HCT VFR BLD AUTO: 35.3 % (ref 36–46)
HCT VFR BLD AUTO: 35.5 % (ref 36–46)
HCT VFR BLD AUTO: 36.1 % (ref 36–46)
HCT VFR BLD AUTO: 36.4 % (ref 36–46)
HCT VFR BLD AUTO: 36.4 % (ref 36–46)
HCT VFR BLD AUTO: 36.6 % (ref 36–46)
HCT VFR BLD AUTO: 38.4 % (ref 36–46)
HCT VFR BLD EST: 35 % (ref 36–46)
HGB BLD-MCNC: 10.3 G/DL (ref 12–16)
HGB BLD-MCNC: 10.4 G/DL (ref 12–16)
HGB BLD-MCNC: 10.5 G/DL (ref 12–16)
HGB BLD-MCNC: 10.5 G/DL (ref 12–16)
HGB BLD-MCNC: 10.7 G/DL (ref 12–16)
HGB BLD-MCNC: 10.9 G/DL (ref 12–16)
HGB BLD-MCNC: 10.9 G/DL (ref 12–16)
HGB BLD-MCNC: 11 G/DL (ref 12–16)
HGB BLD-MCNC: 9.2 G/DL (ref 12–16)
HGB BLD-MCNC: 9.3 G/DL (ref 12–16)
HGB BLDV-MCNC: 11.5 G/DL (ref 12–16)
HOLD SPECIMEN: NORMAL
IMM GRANULOCYTES # BLD AUTO: 0.06 X10*3/UL (ref 0–0.5)
IMM GRANULOCYTES # BLD AUTO: 0.1 X10*3/UL (ref 0–0.5)
IMM GRANULOCYTES NFR BLD AUTO: 0.5 % (ref 0–0.9)
IMM GRANULOCYTES NFR BLD AUTO: 0.6 % (ref 0–0.9)
INHALED O2 CONCENTRATION: 21 %
KETONES UR STRIP.AUTO-MCNC: NEGATIVE MG/DL
KETONES UR STRIP.AUTO-MCNC: NEGATIVE MG/DL
LACTATE BLDV-SCNC: 3 MMOL/L (ref 0.4–2)
LACTATE SERPL-SCNC: 1.9 MMOL/L (ref 0.4–2)
LACTATE SERPL-SCNC: 2.1 MMOL/L (ref 0.4–2)
LACTATE SERPL-SCNC: 2.1 MMOL/L (ref 0.4–2)
LEFT VENTRICLE INTERNAL DIMENSION DIASTOLE: 4.78 CM (ref 3.5–6)
LEFT VENTRICULAR OUTFLOW TRACT DIAMETER: 2.2 CM
LEUKOCYTE ESTERASE UR QL STRIP.AUTO: ABNORMAL
LEUKOCYTE ESTERASE UR QL STRIP.AUTO: ABNORMAL
LYMPHOCYTES # BLD AUTO: 0.94 X10*3/UL (ref 0.8–3)
LYMPHOCYTES # BLD AUTO: 1.28 X10*3/UL (ref 0.8–3)
LYMPHOCYTES NFR BLD AUTO: 7.8 %
LYMPHOCYTES NFR BLD AUTO: 8 %
MAGNESIUM SERPL-MCNC: 1.94 MG/DL (ref 1.6–2.4)
MCH RBC QN AUTO: 26.2 PG (ref 26–34)
MCH RBC QN AUTO: 26.3 PG (ref 26–34)
MCH RBC QN AUTO: 26.4 PG (ref 26–34)
MCH RBC QN AUTO: 27.1 PG (ref 26–34)
MCH RBC QN AUTO: 27.2 PG (ref 26–34)
MCH RBC QN AUTO: 27.5 PG (ref 26–34)
MCH RBC QN AUTO: 27.6 PG (ref 26–34)
MCH RBC QN AUTO: 27.7 PG (ref 26–34)
MCH RBC QN AUTO: 27.8 PG (ref 26–34)
MCH RBC QN AUTO: 27.8 PG (ref 26–34)
MCH RBC QN AUTO: 28 PG (ref 26–34)
MCH RBC QN AUTO: 28.2 PG (ref 26–34)
MCHC RBC AUTO-ENTMCNC: 27.7 G/DL (ref 32–36)
MCHC RBC AUTO-ENTMCNC: 28.4 G/DL (ref 32–36)
MCHC RBC AUTO-ENTMCNC: 28.4 G/DL (ref 32–36)
MCHC RBC AUTO-ENTMCNC: 28.7 G/DL (ref 32–36)
MCHC RBC AUTO-ENTMCNC: 29.5 G/DL (ref 32–36)
MCHC RBC AUTO-ENTMCNC: 29.6 G/DL (ref 32–36)
MCHC RBC AUTO-ENTMCNC: 29.6 G/DL (ref 32–36)
MCHC RBC AUTO-ENTMCNC: 29.9 G/DL (ref 32–36)
MCHC RBC AUTO-ENTMCNC: 30.2 G/DL (ref 32–36)
MCHC RBC AUTO-ENTMCNC: 30.2 G/DL (ref 32–36)
MCHC RBC AUTO-ENTMCNC: 30.3 G/DL (ref 32–36)
MCHC RBC AUTO-ENTMCNC: 30.7 G/DL (ref 32–36)
MCV RBC AUTO: 91 FL (ref 80–100)
MCV RBC AUTO: 92 FL (ref 80–100)
MCV RBC AUTO: 92 FL (ref 80–100)
MCV RBC AUTO: 93 FL (ref 80–100)
MCV RBC AUTO: 94 FL (ref 80–100)
MCV RBC AUTO: 99 FL (ref 80–100)
MITRAL VALVE E/A RATIO: 1.01
MONOCYTES # BLD AUTO: 0.89 X10*3/UL (ref 0.05–0.8)
MONOCYTES # BLD AUTO: 0.9 X10*3/UL (ref 0.05–0.8)
MONOCYTES NFR BLD AUTO: 5.6 %
MONOCYTES NFR BLD AUTO: 7.4 %
MRSA DNA SPEC QL NAA+PROBE: DETECTED
NEUTROPHILS # BLD AUTO: 10.04 X10*3/UL (ref 1.6–5.5)
NEUTROPHILS # BLD AUTO: 13.66 X10*3/UL (ref 1.6–5.5)
NEUTROPHILS NFR BLD AUTO: 83.7 %
NEUTROPHILS NFR BLD AUTO: 85.3 %
NITRITE UR QL STRIP.AUTO: NEGATIVE
NITRITE UR QL STRIP.AUTO: NEGATIVE
NRBC BLD-RTO: 0 /100 WBCS (ref 0–0)
OXYHGB MFR BLDV: 93.8 % (ref 45–75)
P AXIS: 56 DEGREES
P OFFSET: 179 MS
P ONSET: 124 MS
PCO2 BLDV: 30 MM HG (ref 41–51)
PH BLDV: 7.54 PH (ref 7.33–7.43)
PH UR STRIP.AUTO: 6 [PH]
PH UR STRIP.AUTO: 8 [PH]
PLATELET # BLD AUTO: 213 X10*3/UL (ref 150–450)
PLATELET # BLD AUTO: 232 X10*3/UL (ref 150–450)
PLATELET # BLD AUTO: 251 X10*3/UL (ref 150–450)
PLATELET # BLD AUTO: 278 X10*3/UL (ref 150–450)
PLATELET # BLD AUTO: 285 X10*3/UL (ref 150–450)
PLATELET # BLD AUTO: 292 X10*3/UL (ref 150–450)
PLATELET # BLD AUTO: 293 X10*3/UL (ref 150–450)
PLATELET # BLD AUTO: 325 X10*3/UL (ref 150–450)
PLATELET # BLD AUTO: 332 X10*3/UL (ref 150–450)
PLATELET # BLD AUTO: 337 X10*3/UL (ref 150–450)
PLATELET # BLD AUTO: 348 X10*3/UL (ref 150–450)
PLATELET # BLD AUTO: 357 X10*3/UL (ref 150–450)
PO2 BLDV: 88 MM HG (ref 35–45)
POTASSIUM BLDV-SCNC: 4.2 MMOL/L (ref 3.5–5.3)
POTASSIUM SERPL-SCNC: 3.2 MMOL/L (ref 3.5–5.3)
POTASSIUM SERPL-SCNC: 3.2 MMOL/L (ref 3.5–5.3)
POTASSIUM SERPL-SCNC: 3.6 MMOL/L (ref 3.5–5.3)
POTASSIUM SERPL-SCNC: 3.7 MMOL/L (ref 3.5–5.3)
POTASSIUM SERPL-SCNC: 3.9 MMOL/L (ref 3.5–5.3)
POTASSIUM SERPL-SCNC: 4 MMOL/L (ref 3.5–5.3)
POTASSIUM SERPL-SCNC: 4 MMOL/L (ref 3.5–5.3)
POTASSIUM SERPL-SCNC: 4.1 MMOL/L (ref 3.5–5.3)
POTASSIUM SERPL-SCNC: 4.3 MMOL/L (ref 3.5–5.3)
POTASSIUM SERPL-SCNC: 4.5 MMOL/L (ref 3.5–5.3)
PR INTERVAL: 176 MS
PROT SERPL-MCNC: 6.9 G/DL (ref 6.4–8.2)
PROT SERPL-MCNC: 7.2 G/DL (ref 6.4–8.2)
PROT SERPL-MCNC: 7.2 G/DL (ref 6.4–8.2)
PROT SERPL-MCNC: 7.3 G/DL (ref 6.4–8.2)
PROT SERPL-MCNC: 7.5 G/DL (ref 6.4–8.2)
PROT SERPL-MCNC: 7.7 G/DL (ref 6.4–8.2)
PROT SERPL-MCNC: 8.4 G/DL (ref 6.4–8.2)
PROT SERPL-MCNC: 8.8 G/DL (ref 6.4–8.2)
PROT UR STRIP.AUTO-MCNC: ABNORMAL MG/DL
PROT UR STRIP.AUTO-MCNC: ABNORMAL MG/DL
Q ONSET: 212 MS
QRS COUNT: 14 BEATS
QRS DURATION: 104 MS
QT INTERVAL: 404 MS
QTC CALCULATION(BAZETT): 477 MS
QTC FREDERICIA: 451 MS
R AXIS: -65 DEGREES
RBC # BLD AUTO: 3.34 X10*6/UL (ref 4–5.2)
RBC # BLD AUTO: 3.52 X10*6/UL (ref 4–5.2)
RBC # BLD AUTO: 3.77 X10*6/UL (ref 4–5.2)
RBC # BLD AUTO: 3.79 X10*6/UL (ref 4–5.2)
RBC # BLD AUTO: 3.8 X10*6/UL (ref 4–5.2)
RBC # BLD AUTO: 3.85 X10*6/UL (ref 4–5.2)
RBC # BLD AUTO: 3.85 X10*6/UL (ref 4–5.2)
RBC # BLD AUTO: 3.88 X10*6/UL (ref 4–5.2)
RBC # BLD AUTO: 3.93 X10*6/UL (ref 4–5.2)
RBC # BLD AUTO: 3.93 X10*6/UL (ref 4–5.2)
RBC # BLD AUTO: 4.01 X10*6/UL (ref 4–5.2)
RBC # BLD AUTO: 4.14 X10*6/UL (ref 4–5.2)
RBC # UR STRIP.AUTO: ABNORMAL /UL
RBC # UR STRIP.AUTO: NEGATIVE /UL
RBC #/AREA URNS AUTO: >20 /HPF
RBC #/AREA URNS AUTO: ABNORMAL /HPF
RIGHT VENTRICLE FREE WALL PEAK S': 15.8 CM/S
RIGHT VENTRICLE PEAK SYSTOLIC PRESSURE: 25.3 MMHG
RSV RNA RESP QL NAA+PROBE: NOT DETECTED
SAO2 % BLDV: 99 % (ref 45–75)
SARS-COV-2 RNA RESP QL NAA+PROBE: NOT DETECTED
SODIUM BLDV-SCNC: 146 MMOL/L (ref 136–145)
SODIUM SERPL-SCNC: 135 MMOL/L (ref 136–145)
SODIUM SERPL-SCNC: 136 MMOL/L (ref 136–145)
SODIUM SERPL-SCNC: 137 MMOL/L (ref 136–145)
SODIUM SERPL-SCNC: 138 MMOL/L (ref 136–145)
SODIUM SERPL-SCNC: 139 MMOL/L (ref 136–145)
SODIUM SERPL-SCNC: 140 MMOL/L (ref 136–145)
SODIUM SERPL-SCNC: 140 MMOL/L (ref 136–145)
SODIUM SERPL-SCNC: 141 MMOL/L (ref 136–145)
SODIUM SERPL-SCNC: 144 MMOL/L (ref 136–145)
SODIUM SERPL-SCNC: 145 MMOL/L (ref 136–145)
SODIUM SERPL-SCNC: 146 MMOL/L (ref 136–145)
SODIUM SERPL-SCNC: 147 MMOL/L (ref 136–145)
SP GR UR STRIP.AUTO: 1.02
SP GR UR STRIP.AUTO: 1.02
T AXIS: 67 DEGREES
T OFFSET: 414 MS
TRICUSPID ANNULAR PLANE SYSTOLIC EXCURSION: 2.1 CM
UROBILINOGEN UR STRIP.AUTO-MCNC: <2 MG/DL
UROBILINOGEN UR STRIP.AUTO-MCNC: <2 MG/DL
VANCOMYCIN SERPL-MCNC: 15.8 UG/ML (ref 5–20)
VANCOMYCIN TROUGH SERPL-MCNC: 12.4 UG/ML (ref 5–20)
VENTRICULAR RATE: 84 BPM
WBC # BLD AUTO: 12 X10*3/UL (ref 4.4–11.3)
WBC # BLD AUTO: 16 X10*3/UL (ref 4.4–11.3)
WBC # BLD AUTO: 7.3 X10*3/UL (ref 4.4–11.3)
WBC # BLD AUTO: 7.3 X10*3/UL (ref 4.4–11.3)
WBC # BLD AUTO: 7.5 X10*3/UL (ref 4.4–11.3)
WBC # BLD AUTO: 7.7 X10*3/UL (ref 4.4–11.3)
WBC # BLD AUTO: 8 X10*3/UL (ref 4.4–11.3)
WBC # BLD AUTO: 8 X10*3/UL (ref 4.4–11.3)
WBC # BLD AUTO: 8.1 X10*3/UL (ref 4.4–11.3)
WBC # BLD AUTO: 8.2 X10*3/UL (ref 4.4–11.3)
WBC # BLD AUTO: 8.4 X10*3/UL (ref 4.4–11.3)
WBC # BLD AUTO: 8.8 X10*3/UL (ref 4.4–11.3)
WBC #/AREA URNS AUTO: ABNORMAL /HPF
WBC #/AREA URNS AUTO: ABNORMAL /HPF
WBC CLUMPS #/AREA URNS AUTO: ABNORMAL /HPF

## 2024-01-01 PROCEDURE — 85025 COMPLETE CBC W/AUTO DIFF WBC: CPT | Performed by: STUDENT IN AN ORGANIZED HEALTH CARE EDUCATION/TRAINING PROGRAM

## 2024-01-01 PROCEDURE — 80202 ASSAY OF VANCOMYCIN: CPT | Performed by: NURSE PRACTITIONER

## 2024-01-01 PROCEDURE — 2500000002 HC RX 250 W HCPCS SELF ADMINISTERED DRUGS (ALT 637 FOR MEDICARE OP, ALT 636 FOR OP/ED)

## 2024-01-01 PROCEDURE — C9113 INJ PANTOPRAZOLE SODIUM, VIA: HCPCS

## 2024-01-01 PROCEDURE — 36415 COLL VENOUS BLD VENIPUNCTURE: CPT

## 2024-01-01 PROCEDURE — 2500000004 HC RX 250 GENERAL PHARMACY W/ HCPCS (ALT 636 FOR OP/ED)

## 2024-01-01 PROCEDURE — 80053 COMPREHEN METABOLIC PANEL: CPT | Performed by: INTERNAL MEDICINE

## 2024-01-01 PROCEDURE — 2500000001 HC RX 250 WO HCPCS SELF ADMINISTERED DRUGS (ALT 637 FOR MEDICARE OP): Performed by: INTERNAL MEDICINE

## 2024-01-01 PROCEDURE — 2500000001 HC RX 250 WO HCPCS SELF ADMINISTERED DRUGS (ALT 637 FOR MEDICARE OP): Performed by: NURSE PRACTITIONER

## 2024-01-01 PROCEDURE — 2500000004 HC RX 250 GENERAL PHARMACY W/ HCPCS (ALT 636 FOR OP/ED): Performed by: INTERNAL MEDICINE

## 2024-01-01 PROCEDURE — 82947 ASSAY GLUCOSE BLOOD QUANT: CPT

## 2024-01-01 PROCEDURE — 1160F RVW MEDS BY RX/DR IN RCRD: CPT | Performed by: SURGERY

## 2024-01-01 PROCEDURE — 36415 COLL VENOUS BLD VENIPUNCTURE: CPT | Performed by: NURSE PRACTITIONER

## 2024-01-01 PROCEDURE — 3044F HG A1C LEVEL LT 7.0%: CPT | Performed by: ANESTHESIOLOGY

## 2024-01-01 PROCEDURE — 3008F BODY MASS INDEX DOCD: CPT | Performed by: SURGERY

## 2024-01-01 PROCEDURE — 83880 ASSAY OF NATRIURETIC PEPTIDE: CPT | Performed by: STUDENT IN AN ORGANIZED HEALTH CARE EDUCATION/TRAINING PROGRAM

## 2024-01-01 PROCEDURE — 85027 COMPLETE CBC AUTOMATED: CPT | Performed by: INTERNAL MEDICINE

## 2024-01-01 PROCEDURE — 1210000001 HC SEMI-PRIVATE ROOM DAILY

## 2024-01-01 PROCEDURE — 36415 COLL VENOUS BLD VENIPUNCTURE: CPT | Performed by: INTERNAL MEDICINE

## 2024-01-01 PROCEDURE — 87040 BLOOD CULTURE FOR BACTERIA: CPT | Mod: PARLAB | Performed by: EMERGENCY MEDICINE

## 2024-01-01 PROCEDURE — 3079F DIAST BP 80-89 MM HG: CPT | Performed by: ANESTHESIOLOGY

## 2024-01-01 PROCEDURE — 2500000001 HC RX 250 WO HCPCS SELF ADMINISTERED DRUGS (ALT 637 FOR MEDICARE OP)

## 2024-01-01 PROCEDURE — 2500000002 HC RX 250 W HCPCS SELF ADMINISTERED DRUGS (ALT 637 FOR MEDICARE OP, ALT 636 FOR OP/ED): Performed by: NURSE PRACTITIONER

## 2024-01-01 PROCEDURE — 11043 DBRDMT MUSC&/FSCA 1ST 20/<: CPT

## 2024-01-01 PROCEDURE — 83605 ASSAY OF LACTIC ACID: CPT | Performed by: STUDENT IN AN ORGANIZED HEALTH CARE EDUCATION/TRAINING PROGRAM

## 2024-01-01 PROCEDURE — 87040 BLOOD CULTURE FOR BACTERIA: CPT | Mod: PARLAB | Performed by: STUDENT IN AN ORGANIZED HEALTH CARE EDUCATION/TRAINING PROGRAM

## 2024-01-01 PROCEDURE — 96365 THER/PROPH/DIAG IV INF INIT: CPT

## 2024-01-01 PROCEDURE — 1036F TOBACCO NON-USER: CPT | Performed by: NEUROLOGICAL SURGERY

## 2024-01-01 PROCEDURE — 36415 COLL VENOUS BLD VENIPUNCTURE: CPT | Performed by: EMERGENCY MEDICINE

## 2024-01-01 PROCEDURE — 92526 ORAL FUNCTION THERAPY: CPT | Mod: GN

## 2024-01-01 PROCEDURE — 3066F NEPHROPATHY DOC TX: CPT | Performed by: NEUROLOGICAL SURGERY

## 2024-01-01 PROCEDURE — 80048 BASIC METABOLIC PNL TOTAL CA: CPT | Performed by: PHYSICIAN ASSISTANT

## 2024-01-01 PROCEDURE — 1157F ADVNC CARE PLAN IN RCRD: CPT | Performed by: ANESTHESIOLOGY

## 2024-01-01 PROCEDURE — 1036F TOBACCO NON-USER: CPT | Performed by: ANESTHESIOLOGY

## 2024-01-01 PROCEDURE — 84075 ASSAY ALKALINE PHOSPHATASE: CPT | Performed by: INTERNAL MEDICINE

## 2024-01-01 PROCEDURE — 1159F MED LIST DOCD IN RCRD: CPT | Performed by: NEUROLOGICAL SURGERY

## 2024-01-01 PROCEDURE — 99233 SBSQ HOSP IP/OBS HIGH 50: CPT | Performed by: INTERNAL MEDICINE

## 2024-01-01 PROCEDURE — 87637 SARSCOV2&INF A&B&RSV AMP PRB: CPT | Performed by: EMERGENCY MEDICINE

## 2024-01-01 PROCEDURE — 87641 MR-STAPH DNA AMP PROBE: CPT

## 2024-01-01 PROCEDURE — 3E0G76Z INTRODUCTION OF NUTRITIONAL SUBSTANCE INTO UPPER GI, VIA NATURAL OR ARTIFICIAL OPENING: ICD-10-PCS | Performed by: INTERNAL MEDICINE

## 2024-01-01 PROCEDURE — 74176 CT ABD & PELVIS W/O CONTRAST: CPT | Performed by: RADIOLOGY

## 2024-01-01 PROCEDURE — 97162 PT EVAL MOD COMPLEX 30 MIN: CPT | Mod: GP

## 2024-01-01 PROCEDURE — 2500000004 HC RX 250 GENERAL PHARMACY W/ HCPCS (ALT 636 FOR OP/ED): Performed by: ANESTHESIOLOGY

## 2024-01-01 PROCEDURE — 2720000007 HC OR 272 NO HCPCS

## 2024-01-01 PROCEDURE — 2500000004 HC RX 250 GENERAL PHARMACY W/ HCPCS (ALT 636 FOR OP/ED): Performed by: NURSE PRACTITIONER

## 2024-01-01 PROCEDURE — 84484 ASSAY OF TROPONIN QUANT: CPT | Performed by: STUDENT IN AN ORGANIZED HEALTH CARE EDUCATION/TRAINING PROGRAM

## 2024-01-01 PROCEDURE — 80053 COMPREHEN METABOLIC PANEL: CPT | Performed by: STUDENT IN AN ORGANIZED HEALTH CARE EDUCATION/TRAINING PROGRAM

## 2024-01-01 PROCEDURE — 99221 1ST HOSP IP/OBS SF/LOW 40: CPT | Performed by: SURGERY

## 2024-01-01 PROCEDURE — 80048 BASIC METABOLIC PNL TOTAL CA: CPT | Performed by: NURSE PRACTITIONER

## 2024-01-01 PROCEDURE — 1125F AMNT PAIN NOTED PAIN PRSNT: CPT | Performed by: SURGERY

## 2024-01-01 PROCEDURE — 7100000001 HC RECOVERY ROOM TIME - INITIAL BASE CHARGE

## 2024-01-01 PROCEDURE — 2500000004 HC RX 250 GENERAL PHARMACY W/ HCPCS (ALT 636 FOR OP/ED): Performed by: REGISTERED NURSE

## 2024-01-01 PROCEDURE — 1125F AMNT PAIN NOTED PAIN PRSNT: CPT | Performed by: ANESTHESIOLOGY

## 2024-01-01 PROCEDURE — A43246 PR EDG PERCUTANEOUS PLACEMENT GASTROSTOMY TUBE: Performed by: ANESTHESIOLOGY

## 2024-01-01 PROCEDURE — 2500000004 HC RX 250 GENERAL PHARMACY W/ HCPCS (ALT 636 FOR OP/ED): Performed by: STUDENT IN AN ORGANIZED HEALTH CARE EDUCATION/TRAINING PROGRAM

## 2024-01-01 PROCEDURE — 2500000001 HC RX 250 WO HCPCS SELF ADMINISTERED DRUGS (ALT 637 FOR MEDICARE OP): Performed by: PHYSICIAN ASSISTANT

## 2024-01-01 PROCEDURE — 99291 CRITICAL CARE FIRST HOUR: CPT | Performed by: STUDENT IN AN ORGANIZED HEALTH CARE EDUCATION/TRAINING PROGRAM

## 2024-01-01 PROCEDURE — 2500000002 HC RX 250 W HCPCS SELF ADMINISTERED DRUGS (ALT 637 FOR MEDICARE OP, ALT 636 FOR OP/ED): Performed by: INTERNAL MEDICINE

## 2024-01-01 PROCEDURE — 87070 CULTURE OTHR SPECIMN AEROBIC: CPT | Mod: OUT,PARLAB | Performed by: INTERNAL MEDICINE

## 2024-01-01 PROCEDURE — 1126F AMNT PAIN NOTED NONE PRSNT: CPT | Performed by: NEUROLOGICAL SURGERY

## 2024-01-01 PROCEDURE — 1160F RVW MEDS BY RX/DR IN RCRD: CPT | Performed by: NEUROLOGICAL SURGERY

## 2024-01-01 PROCEDURE — 43246 EGD PLACE GASTROSTOMY TUBE: CPT | Performed by: INTERNAL MEDICINE

## 2024-01-01 PROCEDURE — 80202 ASSAY OF VANCOMYCIN: CPT

## 2024-01-01 PROCEDURE — 95885 MUSC TST DONE W/NERV TST LIM: CPT | Performed by: PSYCHIATRY & NEUROLOGY

## 2024-01-01 PROCEDURE — 93005 ELECTROCARDIOGRAM TRACING: CPT

## 2024-01-01 PROCEDURE — 3008F BODY MASS INDEX DOCD: CPT | Performed by: NEUROLOGICAL SURGERY

## 2024-01-01 PROCEDURE — 85027 COMPLETE CBC AUTOMATED: CPT | Performed by: NURSE PRACTITIONER

## 2024-01-01 PROCEDURE — 99291 CRITICAL CARE FIRST HOUR: CPT | Mod: 25 | Performed by: STUDENT IN AN ORGANIZED HEALTH CARE EDUCATION/TRAINING PROGRAM

## 2024-01-01 PROCEDURE — 84132 ASSAY OF SERUM POTASSIUM: CPT | Performed by: EMERGENCY MEDICINE

## 2024-01-01 PROCEDURE — 99221 1ST HOSP IP/OBS SF/LOW 40: CPT | Performed by: REGISTERED NURSE

## 2024-01-01 PROCEDURE — 3700000001 HC GENERAL ANESTHESIA TIME - INITIAL BASE CHARGE

## 2024-01-01 PROCEDURE — 3074F SYST BP LT 130 MM HG: CPT | Performed by: NEUROLOGICAL SURGERY

## 2024-01-01 PROCEDURE — 1160F RVW MEDS BY RX/DR IN RCRD: CPT | Performed by: ANESTHESIOLOGY

## 2024-01-01 PROCEDURE — 99213 OFFICE O/P EST LOW 20 MIN: CPT | Performed by: ANESTHESIOLOGY

## 2024-01-01 PROCEDURE — 36415 COLL VENOUS BLD VENIPUNCTURE: CPT | Performed by: PHYSICIAN ASSISTANT

## 2024-01-01 PROCEDURE — 3074F SYST BP LT 130 MM HG: CPT | Performed by: SURGERY

## 2024-01-01 PROCEDURE — 1159F MED LIST DOCD IN RCRD: CPT | Performed by: ANESTHESIOLOGY

## 2024-01-01 PROCEDURE — 92610 EVALUATE SWALLOWING FUNCTION: CPT | Mod: GN

## 2024-01-01 PROCEDURE — 96367 TX/PROPH/DG ADDL SEQ IV INF: CPT

## 2024-01-01 PROCEDURE — 0DH63UZ INSERTION OF FEEDING DEVICE INTO STOMACH, PERCUTANEOUS APPROACH: ICD-10-PCS | Performed by: INTERNAL MEDICINE

## 2024-01-01 PROCEDURE — 99221 1ST HOSP IP/OBS SF/LOW 40: CPT | Performed by: INTERNAL MEDICINE

## 2024-01-01 PROCEDURE — 3008F BODY MASS INDEX DOCD: CPT | Performed by: ANESTHESIOLOGY

## 2024-01-01 PROCEDURE — 99100 ANES PT EXTEME AGE<1 YR&>70: CPT | Performed by: ANESTHESIOLOGY

## 2024-01-01 PROCEDURE — 74176 CT ABD & PELVIS W/O CONTRAST: CPT

## 2024-01-01 PROCEDURE — 3700000002 HC GENERAL ANESTHESIA TIME - EACH INCREMENTAL 1 MINUTE

## 2024-01-01 PROCEDURE — 99231 SBSQ HOSP IP/OBS SF/LOW 25: CPT | Performed by: NURSE PRACTITIONER

## 2024-01-01 PROCEDURE — 95909 NRV CNDJ TST 5-6 STUDIES: CPT | Performed by: PSYCHIATRY & NEUROLOGY

## 2024-01-01 PROCEDURE — 93306 TTE W/DOPPLER COMPLETE: CPT | Performed by: INTERNAL MEDICINE

## 2024-01-01 PROCEDURE — 99291 CRITICAL CARE FIRST HOUR: CPT | Performed by: EMERGENCY MEDICINE

## 2024-01-01 PROCEDURE — 2500000004 HC RX 250 GENERAL PHARMACY W/ HCPCS (ALT 636 FOR OP/ED): Performed by: PHYSICIAN ASSISTANT

## 2024-01-01 PROCEDURE — 36415 COLL VENOUS BLD VENIPUNCTURE: CPT | Performed by: STUDENT IN AN ORGANIZED HEALTH CARE EDUCATION/TRAINING PROGRAM

## 2024-01-01 PROCEDURE — 83735 ASSAY OF MAGNESIUM: CPT | Performed by: PHYSICIAN ASSISTANT

## 2024-01-01 PROCEDURE — 43246 EGD PLACE GASTROSTOMY TUBE: CPT | Performed by: SURGERY

## 2024-01-01 PROCEDURE — 85027 COMPLETE CBC AUTOMATED: CPT

## 2024-01-01 PROCEDURE — 1036F TOBACCO NON-USER: CPT | Performed by: SURGERY

## 2024-01-01 PROCEDURE — 99213 OFFICE O/P EST LOW 20 MIN: CPT | Performed by: SURGERY

## 2024-01-01 PROCEDURE — 99213 OFFICE O/P EST LOW 20 MIN: CPT | Mod: GC | Performed by: NEUROLOGICAL SURGERY

## 2024-01-01 PROCEDURE — 3078F DIAST BP <80 MM HG: CPT | Performed by: SURGERY

## 2024-01-01 PROCEDURE — 96366 THER/PROPH/DIAG IV INF ADDON: CPT

## 2024-01-01 PROCEDURE — 99222 1ST HOSP IP/OBS MODERATE 55: CPT | Performed by: NURSE PRACTITIONER

## 2024-01-01 PROCEDURE — 99203 OFFICE O/P NEW LOW 30 MIN: CPT | Performed by: NEUROLOGICAL SURGERY

## 2024-01-01 PROCEDURE — 97165 OT EVAL LOW COMPLEX 30 MIN: CPT | Mod: GO

## 2024-01-01 PROCEDURE — 99223 1ST HOSP IP/OBS HIGH 75: CPT

## 2024-01-01 PROCEDURE — 83605 ASSAY OF LACTIC ACID: CPT | Performed by: EMERGENCY MEDICINE

## 2024-01-01 PROCEDURE — 2500000004 HC RX 250 GENERAL PHARMACY W/ HCPCS (ALT 636 FOR OP/ED): Performed by: EMERGENCY MEDICINE

## 2024-01-01 PROCEDURE — 11042 DBRDMT SUBQ TIS 1ST 20SQCM/<: CPT

## 2024-01-01 PROCEDURE — 85025 COMPLETE CBC W/AUTO DIFF WBC: CPT | Performed by: EMERGENCY MEDICINE

## 2024-01-01 PROCEDURE — 99212 OFFICE O/P EST SF 10 MIN: CPT | Performed by: PSYCHIATRY & NEUROLOGY

## 2024-01-01 PROCEDURE — 3078F DIAST BP <80 MM HG: CPT | Performed by: NEUROLOGICAL SURGERY

## 2024-01-01 PROCEDURE — 71045 X-RAY EXAM CHEST 1 VIEW: CPT

## 2024-01-01 PROCEDURE — 1111F DSCHRG MED/CURRENT MED MERGE: CPT | Performed by: ANESTHESIOLOGY

## 2024-01-01 PROCEDURE — 93010 ELECTROCARDIOGRAM REPORT: CPT | Performed by: INTERNAL MEDICINE

## 2024-01-01 PROCEDURE — 71045 X-RAY EXAM CHEST 1 VIEW: CPT | Mod: FOREIGN READ | Performed by: RADIOLOGY

## 2024-01-01 PROCEDURE — 87086 URINE CULTURE/COLONY COUNT: CPT | Mod: PARLAB | Performed by: EMERGENCY MEDICINE

## 2024-01-01 PROCEDURE — 81001 URINALYSIS AUTO W/SCOPE: CPT | Performed by: STUDENT IN AN ORGANIZED HEALTH CARE EDUCATION/TRAINING PROGRAM

## 2024-01-01 PROCEDURE — 92950 HEART/LUNG RESUSCITATION CPR: CPT

## 2024-01-01 PROCEDURE — 96375 TX/PRO/DX INJ NEW DRUG ADDON: CPT

## 2024-01-01 PROCEDURE — 99214 OFFICE O/P EST MOD 30 MIN: CPT | Mod: 25

## 2024-01-01 PROCEDURE — 1159F MED LIST DOCD IN RCRD: CPT | Performed by: SURGERY

## 2024-01-01 PROCEDURE — 83605 ASSAY OF LACTIC ACID: CPT | Performed by: NURSE PRACTITIONER

## 2024-01-01 PROCEDURE — 87086 URINE CULTURE/COLONY COUNT: CPT | Mod: PARLAB | Performed by: STUDENT IN AN ORGANIZED HEALTH CARE EDUCATION/TRAINING PROGRAM

## 2024-01-01 PROCEDURE — 99231 SBSQ HOSP IP/OBS SF/LOW 25: CPT | Performed by: SURGERY

## 2024-01-01 PROCEDURE — 3066F NEPHROPATHY DOC TX: CPT | Performed by: SURGERY

## 2024-01-01 PROCEDURE — 2500000005 HC RX 250 GENERAL PHARMACY W/O HCPCS: Performed by: PHYSICIAN ASSISTANT

## 2024-01-01 PROCEDURE — 93306 TTE W/DOPPLER COMPLETE: CPT

## 2024-01-01 PROCEDURE — 99232 SBSQ HOSP IP/OBS MODERATE 35: CPT | Performed by: INTERNAL MEDICINE

## 2024-01-01 PROCEDURE — 80048 BASIC METABOLIC PNL TOTAL CA: CPT

## 2024-01-01 PROCEDURE — A43246 PR EDG PERCUTANEOUS PLACEMENT GASTROSTOMY TUBE: Performed by: ANESTHESIOLOGIST ASSISTANT

## 2024-01-01 PROCEDURE — 7100000002 HC RECOVERY ROOM TIME - EACH INCREMENTAL 1 MINUTE

## 2024-01-01 PROCEDURE — 3077F SYST BP >= 140 MM HG: CPT | Performed by: ANESTHESIOLOGY

## 2024-01-01 PROCEDURE — 81001 URINALYSIS AUTO W/SCOPE: CPT | Performed by: EMERGENCY MEDICINE

## 2024-01-01 PROCEDURE — 31500 INSERT EMERGENCY AIRWAY: CPT | Performed by: STUDENT IN AN ORGANIZED HEALTH CARE EDUCATION/TRAINING PROGRAM

## 2024-01-01 RX ORDER — MUPIROCIN 20 MG/G
1 OINTMENT TOPICAL 2 TIMES DAILY
Status: DISPENSED | OUTPATIENT
Start: 2024-01-01 | End: 2024-01-01

## 2024-01-01 RX ORDER — LEVOTHYROXINE SODIUM 88 UG/1
88 TABLET ORAL DAILY
Status: DISCONTINUED | OUTPATIENT
Start: 2024-01-01 | End: 2024-01-01 | Stop reason: HOSPADM

## 2024-01-01 RX ORDER — BACLOFEN 10 MG/1
5 TABLET ORAL ONCE
Status: COMPLETED | OUTPATIENT
Start: 2024-01-01 | End: 2024-01-01

## 2024-01-01 RX ORDER — IPRATROPIUM BROMIDE AND ALBUTEROL SULFATE 2.5; .5 MG/3ML; MG/3ML
3 SOLUTION RESPIRATORY (INHALATION) EVERY 6 HOURS PRN
Status: DISCONTINUED | OUTPATIENT
Start: 2024-01-01 | End: 2024-01-01 | Stop reason: HOSPADM

## 2024-01-01 RX ORDER — TRIAMCINOLONE ACETONIDE 1 MG/G
1 CREAM TOPICAL AS NEEDED
COMMUNITY
Start: 2024-01-01

## 2024-01-01 RX ORDER — NALOXONE HYDROCHLORIDE 1 MG/ML
0.4 INJECTION INTRAMUSCULAR; INTRAVENOUS; SUBCUTANEOUS ONCE
Status: COMPLETED | OUTPATIENT
Start: 2024-01-01 | End: 2024-01-01

## 2024-01-01 RX ORDER — POLYETHYLENE GLYCOL 3350 17 G/17G
17 POWDER, FOR SOLUTION ORAL DAILY
Status: DISCONTINUED | OUTPATIENT
Start: 2024-01-01 | End: 2024-01-01 | Stop reason: HOSPADM

## 2024-01-01 RX ORDER — ACETAMINOPHEN 325 MG/1
650 TABLET ORAL EVERY 4 HOURS PRN
Status: DISCONTINUED | OUTPATIENT
Start: 2024-01-01 | End: 2024-01-01 | Stop reason: HOSPADM

## 2024-01-01 RX ORDER — HYDROCORTISONE 1 %
CREAM (GRAM) TOPICAL 2 TIMES DAILY
Status: DISCONTINUED | OUTPATIENT
Start: 2024-01-01 | End: 2024-01-01 | Stop reason: HOSPADM

## 2024-01-01 RX ORDER — ROSUVASTATIN CALCIUM 10 MG/1
40 TABLET, COATED ORAL DAILY
Status: DISCONTINUED | OUTPATIENT
Start: 2024-01-01 | End: 2024-01-01 | Stop reason: HOSPADM

## 2024-01-01 RX ORDER — CALCITRIOL 0.25 UG/1
0.25 CAPSULE ORAL 3 TIMES WEEKLY
Status: DISCONTINUED | OUTPATIENT
Start: 2024-01-01 | End: 2024-01-01 | Stop reason: HOSPADM

## 2024-01-01 RX ORDER — SODIUM CHLORIDE 9 MG/ML
50 INJECTION, SOLUTION INTRAVENOUS CONTINUOUS
Status: ACTIVE | OUTPATIENT
Start: 2024-01-01 | End: 2024-01-01

## 2024-01-01 RX ORDER — POTASSIUM CHLORIDE 1.5 G/1.58G
40 POWDER, FOR SOLUTION ORAL ONCE
Status: COMPLETED | OUTPATIENT
Start: 2024-01-01 | End: 2024-01-01

## 2024-01-01 RX ORDER — DULOXETIN HYDROCHLORIDE 30 MG/1
30 CAPSULE, DELAYED RELEASE ORAL DAILY
Status: DISCONTINUED | OUTPATIENT
Start: 2024-01-01 | End: 2024-01-01 | Stop reason: HOSPADM

## 2024-01-01 RX ORDER — DEXTROSE 50 % IN WATER (D50W) INTRAVENOUS SYRINGE
25
Status: DISCONTINUED | OUTPATIENT
Start: 2024-01-01 | End: 2024-01-01 | Stop reason: HOSPADM

## 2024-01-01 RX ORDER — INDOMETHACIN 25 MG/1
CAPSULE ORAL CODE/TRAUMA/SEDATION MEDICATION
Status: COMPLETED | OUTPATIENT
Start: 2024-01-01 | End: 2024-01-01

## 2024-01-01 RX ORDER — AMOXICILLIN AND CLAVULANATE POTASSIUM 875; 125 MG/1; MG/1
1 TABLET, FILM COATED ORAL 2 TIMES DAILY
COMMUNITY
End: 2024-01-01 | Stop reason: HOSPADM

## 2024-01-01 RX ORDER — GABAPENTIN 100 MG/1
100 CAPSULE ORAL 3 TIMES DAILY
Status: DISCONTINUED | OUTPATIENT
Start: 2024-01-01 | End: 2024-01-01 | Stop reason: HOSPADM

## 2024-01-01 RX ORDER — EPINEPHRINE 0.1 MG/ML
INJECTION INTRACARDIAC; INTRAVENOUS CODE/TRAUMA/SEDATION MEDICATION
Status: COMPLETED | OUTPATIENT
Start: 2024-01-01 | End: 2024-01-01

## 2024-01-01 RX ORDER — VIT C/E/ZN/COPPR/LUTEIN/ZEAXAN 250MG-90MG
3 CAPSULE ORAL DAILY
COMMUNITY

## 2024-01-01 RX ORDER — METOPROLOL TARTRATE 25 MG/1
25 TABLET, FILM COATED ORAL 2 TIMES DAILY
Status: DISCONTINUED | OUTPATIENT
Start: 2024-01-01 | End: 2024-01-01 | Stop reason: HOSPADM

## 2024-01-01 RX ORDER — TRAMADOL HYDROCHLORIDE 50 MG/1
50 TABLET ORAL EVERY 6 HOURS PRN
Status: DISCONTINUED | OUTPATIENT
Start: 2024-01-01 | End: 2024-01-01 | Stop reason: HOSPADM

## 2024-01-01 RX ORDER — GENTAMICIN SULFATE 1 MG/G
OINTMENT TOPICAL 3 TIMES DAILY
Status: DISCONTINUED | OUTPATIENT
Start: 2024-01-01 | End: 2024-01-01 | Stop reason: HOSPADM

## 2024-01-01 RX ORDER — OXYCODONE AND ACETAMINOPHEN 5; 325 MG/1; MG/1
1 TABLET ORAL 3 TIMES DAILY
COMMUNITY
End: 2024-01-01 | Stop reason: HOSPADM

## 2024-01-01 RX ORDER — OXYCODONE AND ACETAMINOPHEN 5; 325 MG/1; MG/1
1 TABLET ORAL EVERY 8 HOURS PRN
Status: DISCONTINUED | OUTPATIENT
Start: 2024-01-01 | End: 2024-01-01 | Stop reason: HOSPADM

## 2024-01-01 RX ORDER — MIRTAZAPINE 15 MG/1
15 TABLET, FILM COATED ORAL NIGHTLY
Qty: 30 TABLET | Refills: 0 | Status: SHIPPED | OUTPATIENT
Start: 2024-01-01 | End: 2024-03-24

## 2024-01-01 RX ORDER — CARBIDOPA AND LEVODOPA 25; 100 MG/1; MG/1
1 TABLET ORAL 3 TIMES DAILY
Status: DISCONTINUED | OUTPATIENT
Start: 2024-01-01 | End: 2024-01-01 | Stop reason: HOSPADM

## 2024-01-01 RX ORDER — PROPOFOL 10 MG/ML
INJECTION, EMULSION INTRAVENOUS AS NEEDED
Status: DISCONTINUED | OUTPATIENT
Start: 2024-01-01 | End: 2024-01-01

## 2024-01-01 RX ORDER — LEVOTHYROXINE SODIUM 88 UG/1
88 TABLET ORAL
Status: DISCONTINUED | OUTPATIENT
Start: 2024-01-01 | End: 2024-01-01 | Stop reason: HOSPADM

## 2024-01-01 RX ORDER — INSULIN LISPRO 100 [IU]/ML
0-10 INJECTION, SOLUTION INTRAVENOUS; SUBCUTANEOUS
Status: DISCONTINUED | OUTPATIENT
Start: 2024-01-01 | End: 2024-01-01 | Stop reason: HOSPADM

## 2024-01-01 RX ORDER — OXYCODONE AND ACETAMINOPHEN 5; 325 MG/1; MG/1
1 TABLET ORAL 3 TIMES DAILY
Status: DISCONTINUED | OUTPATIENT
Start: 2024-01-01 | End: 2024-01-01

## 2024-01-01 RX ORDER — POLYETHYLENE GLYCOL 3350 17 G/17G
17 POWDER, FOR SOLUTION ORAL DAILY
Start: 2024-01-01 | End: 2024-01-01 | Stop reason: ENTERED-IN-ERROR

## 2024-01-01 RX ORDER — MIRTAZAPINE 15 MG/1
15 TABLET, FILM COATED ORAL NIGHTLY
Status: DISCONTINUED | OUTPATIENT
Start: 2024-01-01 | End: 2024-01-01 | Stop reason: HOSPADM

## 2024-01-01 RX ORDER — DEXTROSE MONOHYDRATE 100 MG/ML
0.3 INJECTION, SOLUTION INTRAVENOUS ONCE AS NEEDED
Status: DISCONTINUED | OUTPATIENT
Start: 2024-01-01 | End: 2024-01-01 | Stop reason: HOSPADM

## 2024-01-01 RX ORDER — TRIAMCINOLONE ACETONIDE 1 MG/G
1 CREAM TOPICAL 2 TIMES DAILY PRN
Status: DISCONTINUED | OUTPATIENT
Start: 2024-01-01 | End: 2024-01-01 | Stop reason: HOSPADM

## 2024-01-01 RX ORDER — MUPIROCIN 20 MG/G
OINTMENT TOPICAL 2 TIMES DAILY
Start: 2024-01-01 | End: 2024-01-01 | Stop reason: ENTERED-IN-ERROR

## 2024-01-01 RX ORDER — SODIUM CHLORIDE 9 MG/ML
100 INJECTION, SOLUTION INTRAVENOUS CONTINUOUS
Status: ACTIVE | OUTPATIENT
Start: 2024-01-01 | End: 2024-01-01

## 2024-01-01 RX ORDER — CARBIDOPA AND LEVODOPA 25; 100 MG/1; MG/1
0.5 TABLET ORAL 3 TIMES DAILY
Status: DISCONTINUED | OUTPATIENT
Start: 2024-01-01 | End: 2024-01-01 | Stop reason: HOSPADM

## 2024-01-01 RX ORDER — PANTOPRAZOLE SODIUM 40 MG/10ML
40 INJECTION, POWDER, LYOPHILIZED, FOR SOLUTION INTRAVENOUS DAILY
Status: DISCONTINUED | OUTPATIENT
Start: 2024-01-01 | End: 2024-01-01 | Stop reason: HOSPADM

## 2024-01-01 RX ORDER — POTASSIUM CHLORIDE 14.9 MG/ML
20 INJECTION INTRAVENOUS
Status: COMPLETED | OUTPATIENT
Start: 2024-01-01 | End: 2024-01-01

## 2024-01-01 RX ORDER — CHOLECALCIFEROL (VITAMIN D3) 25 MCG
1000 TABLET ORAL DAILY
Status: DISCONTINUED | OUTPATIENT
Start: 2024-01-01 | End: 2024-01-01 | Stop reason: HOSPADM

## 2024-01-01 RX ORDER — PROPOFOL 10 MG/ML
INJECTION, EMULSION INTRAVENOUS CONTINUOUS PRN
Status: DISCONTINUED | OUTPATIENT
Start: 2024-01-01 | End: 2024-01-01

## 2024-01-01 RX ORDER — OXYCODONE AND ACETAMINOPHEN 5; 325 MG/1; MG/1
1 TABLET ORAL EVERY 8 HOURS PRN
Qty: 90 TABLET | Refills: 0 | Status: SHIPPED | OUTPATIENT
Start: 2024-01-01 | End: 2024-01-01

## 2024-01-01 RX ORDER — INSULIN LISPRO 100 [IU]/ML
0-10 INJECTION, SOLUTION INTRAVENOUS; SUBCUTANEOUS EVERY 4 HOURS
Status: DISCONTINUED | OUTPATIENT
Start: 2024-01-01 | End: 2024-01-01 | Stop reason: HOSPADM

## 2024-01-01 RX ORDER — MUPIROCIN 20 MG/G
OINTMENT TOPICAL 2 TIMES DAILY
Status: DISCONTINUED | OUTPATIENT
Start: 2024-01-01 | End: 2024-01-01 | Stop reason: HOSPADM

## 2024-01-01 RX ORDER — METOPROLOL TARTRATE 1 MG/ML
5 INJECTION, SOLUTION INTRAVENOUS ONCE
Status: COMPLETED | OUTPATIENT
Start: 2024-01-01 | End: 2024-01-01

## 2024-01-01 RX ORDER — VANCOMYCIN HYDROCHLORIDE 1 G/200ML
1 INJECTION, SOLUTION INTRAVENOUS ONCE
Status: COMPLETED | OUTPATIENT
Start: 2024-01-01 | End: 2024-01-01

## 2024-01-01 RX ORDER — POLYETHYLENE GLYCOL 3350 17 G/17G
17 POWDER, FOR SOLUTION ORAL DAILY
Start: 2024-01-01 | End: 2024-03-24

## 2024-01-01 RX ORDER — GABAPENTIN 100 MG/1
100 CAPSULE ORAL 3 TIMES DAILY
Qty: 300 CAPSULE | Refills: 2 | Status: SHIPPED | OUTPATIENT
Start: 2024-01-01 | End: 2024-03-24

## 2024-01-01 RX ORDER — TAMSULOSIN HYDROCHLORIDE 0.4 MG/1
0.4 CAPSULE ORAL DAILY
Status: DISCONTINUED | OUTPATIENT
Start: 2024-01-01 | End: 2024-01-01 | Stop reason: HOSPADM

## 2024-01-01 RX ADMIN — GABAPENTIN 100 MG: 100 CAPSULE ORAL at 08:33

## 2024-01-01 RX ADMIN — PIPERACILLIN SODIUM AND TAZOBACTAM SODIUM 3.38 G: 3; .375 INJECTION, SOLUTION INTRAVENOUS at 05:43

## 2024-01-01 RX ADMIN — GABAPENTIN 100 MG: 100 CAPSULE ORAL at 09:07

## 2024-01-01 RX ADMIN — CHOLECALCIFEROL TAB 25 MCG (1000 UNIT) 1000 UNITS: 25 TAB at 10:07

## 2024-01-01 RX ADMIN — CHOLECALCIFEROL TAB 25 MCG (1000 UNIT) 1000 UNITS: 25 TAB at 08:08

## 2024-01-01 RX ADMIN — INSULIN LISPRO 2 UNITS: 100 INJECTION, SOLUTION INTRAVENOUS; SUBCUTANEOUS at 12:17

## 2024-01-01 RX ADMIN — EPINEPHRINE 1 MG: 0.1 INJECTION INTRAVENOUS at 08:05

## 2024-01-01 RX ADMIN — GABAPENTIN 100 MG: 100 CAPSULE ORAL at 15:27

## 2024-01-01 RX ADMIN — OXYCODONE HYDROCHLORIDE AND ACETAMINOPHEN 1 TABLET: 5; 325 TABLET ORAL at 21:49

## 2024-01-01 RX ADMIN — MUPIROCIN 1 APPLICATION: 20 OINTMENT TOPICAL at 09:35

## 2024-01-01 RX ADMIN — CARBIDOPA AND LEVODOPA 0.5 TABLET: 25; 100 TABLET ORAL at 15:18

## 2024-01-01 RX ADMIN — PANTOPRAZOLE SODIUM 40 MG: 40 INJECTION, POWDER, FOR SOLUTION INTRAVENOUS at 05:03

## 2024-01-01 RX ADMIN — LEVOTHYROXINE SODIUM 88 MCG: 88 TABLET ORAL at 06:28

## 2024-01-01 RX ADMIN — CARBIDOPA AND LEVODOPA 0.5 TABLET: 25; 100 TABLET ORAL at 08:13

## 2024-01-01 RX ADMIN — PANTOPRAZOLE SODIUM 40 MG: 40 INJECTION, POWDER, FOR SOLUTION INTRAVENOUS at 05:37

## 2024-01-01 RX ADMIN — Medication: at 11:55

## 2024-01-01 RX ADMIN — ROSUVASTATIN CALCIUM 40 MG: 10 TABLET, FILM COATED ORAL at 09:32

## 2024-01-01 RX ADMIN — PROPOFOL 30 MG: 10 INJECTION, EMULSION INTRAVENOUS at 11:09

## 2024-01-01 RX ADMIN — LEVOTHYROXINE SODIUM 88 MCG: 88 TABLET ORAL at 08:28

## 2024-01-01 RX ADMIN — PIPERACILLIN SODIUM AND TAZOBACTAM SODIUM 3.38 G: 3; .375 INJECTION, SOLUTION INTRAVENOUS at 09:55

## 2024-01-01 RX ADMIN — Medication: at 14:59

## 2024-01-01 RX ADMIN — CARBIDOPA AND LEVODOPA 0.5 TABLET: 25; 100 TABLET ORAL at 14:28

## 2024-01-01 RX ADMIN — CARBIDOPA AND LEVODOPA 0.5 TABLET: 25; 100 TABLET ORAL at 15:27

## 2024-01-01 RX ADMIN — SODIUM HYPOCHLORITE: 5 SOLUTION TOPICAL at 21:20

## 2024-01-01 RX ADMIN — PIPERACILLIN SODIUM AND TAZOBACTAM SODIUM 3.38 G: 3; .375 INJECTION, SOLUTION INTRAVENOUS at 06:09

## 2024-01-01 RX ADMIN — Medication 1 APPLICATION: at 20:51

## 2024-01-01 RX ADMIN — GABAPENTIN 100 MG: 100 CAPSULE ORAL at 16:10

## 2024-01-01 RX ADMIN — OXYCODONE HYDROCHLORIDE AND ACETAMINOPHEN 1 TABLET: 5; 325 TABLET ORAL at 20:28

## 2024-01-01 RX ADMIN — RIVAROXABAN 20 MG: 20 TABLET, FILM COATED ORAL at 08:33

## 2024-01-01 RX ADMIN — MUPIROCIN 1 APPLICATION: 20 OINTMENT TOPICAL at 09:54

## 2024-01-01 RX ADMIN — PIPERACILLIN SODIUM AND TAZOBACTAM SODIUM 3.38 G: 3; .375 INJECTION, SOLUTION INTRAVENOUS at 09:56

## 2024-01-01 RX ADMIN — METOPROLOL TARTRATE 25 MG: 25 TABLET, FILM COATED ORAL at 20:56

## 2024-01-01 RX ADMIN — MUPIROCIN 1 APPLICATION: 20 OINTMENT TOPICAL at 20:32

## 2024-01-01 RX ADMIN — ROSUVASTATIN CALCIUM 40 MG: 10 TABLET, FILM COATED ORAL at 09:55

## 2024-01-01 RX ADMIN — GABAPENTIN 100 MG: 100 CAPSULE ORAL at 15:44

## 2024-01-01 RX ADMIN — MUPIROCIN 1 APPLICATION: 20 OINTMENT TOPICAL at 10:25

## 2024-01-01 RX ADMIN — POLYETHYLENE GLYCOL 3350 17 G: 17 POWDER, FOR SOLUTION ORAL at 08:22

## 2024-01-01 RX ADMIN — CARBIDOPA AND LEVODOPA 1 TABLET: 25; 100 TABLET ORAL at 10:07

## 2024-01-01 RX ADMIN — ROSUVASTATIN CALCIUM 40 MG: 10 TABLET, FILM COATED ORAL at 08:08

## 2024-01-01 RX ADMIN — EPINEPHRINE 1 MG: 0.1 INJECTION INTRAVENOUS at 07:59

## 2024-01-01 RX ADMIN — GABAPENTIN 100 MG: 100 CAPSULE ORAL at 10:25

## 2024-01-01 RX ADMIN — Medication: at 21:56

## 2024-01-01 RX ADMIN — GENTAMICIN SULFATE: 1 OINTMENT TOPICAL at 15:29

## 2024-01-01 RX ADMIN — METOPROLOL TARTRATE 25 MG: 25 TABLET, FILM COATED ORAL at 21:55

## 2024-01-01 RX ADMIN — GABAPENTIN 100 MG: 100 CAPSULE ORAL at 21:29

## 2024-01-01 RX ADMIN — PIPERACILLIN SODIUM AND TAZOBACTAM SODIUM 3.38 G: 3; .375 INJECTION, SOLUTION INTRAVENOUS at 04:31

## 2024-01-01 RX ADMIN — POLYETHYLENE GLYCOL 3350 17 G: 17 POWDER, FOR SOLUTION ORAL at 08:08

## 2024-01-01 RX ADMIN — GENTAMICIN SULFATE: 1 OINTMENT TOPICAL at 10:18

## 2024-01-01 RX ADMIN — Medication: at 12:17

## 2024-01-01 RX ADMIN — CARBIDOPA AND LEVODOPA 0.5 TABLET: 25; 100 TABLET ORAL at 15:56

## 2024-01-01 RX ADMIN — EPINEPHRINE 1 MG: 0.1 INJECTION INTRAVENOUS at 08:14

## 2024-01-01 RX ADMIN — PIPERACILLIN SODIUM AND TAZOBACTAM SODIUM 3.38 G: 3; .375 INJECTION, SOLUTION INTRAVENOUS at 01:40

## 2024-01-01 RX ADMIN — AZITHROMYCIN MONOHYDRATE 500 MG: 500 INJECTION, POWDER, LYOPHILIZED, FOR SOLUTION INTRAVENOUS at 02:17

## 2024-01-01 RX ADMIN — VANCOMYCIN HYDROCHLORIDE 1.25 G: 1.25 INJECTION, POWDER, LYOPHILIZED, FOR SOLUTION INTRAVENOUS at 12:54

## 2024-01-01 RX ADMIN — CARBIDOPA AND LEVODOPA 1 TABLET: 25; 100 TABLET ORAL at 17:01

## 2024-01-01 RX ADMIN — CHOLECALCIFEROL TAB 25 MCG (1000 UNIT) 1000 UNITS: 25 TAB at 10:25

## 2024-01-01 RX ADMIN — MUPIROCIN: 20 OINTMENT TOPICAL at 08:53

## 2024-01-01 RX ADMIN — INSULIN LISPRO 4 UNITS: 100 INJECTION, SOLUTION INTRAVENOUS; SUBCUTANEOUS at 09:49

## 2024-01-01 RX ADMIN — MUPIROCIN: 20 OINTMENT TOPICAL at 20:50

## 2024-01-01 RX ADMIN — INSULIN LISPRO 2 UNITS: 100 INJECTION, SOLUTION INTRAVENOUS; SUBCUTANEOUS at 18:06

## 2024-01-01 RX ADMIN — OXYCODONE HYDROCHLORIDE AND ACETAMINOPHEN 1 TABLET: 5; 325 TABLET ORAL at 15:56

## 2024-01-01 RX ADMIN — METOPROLOL TARTRATE 25 MG: 25 TABLET, FILM COATED ORAL at 09:08

## 2024-01-01 RX ADMIN — GENTAMICIN SULFATE: 1 OINTMENT TOPICAL at 11:30

## 2024-01-01 RX ADMIN — PANTOPRAZOLE SODIUM 40 MG: 40 INJECTION, POWDER, FOR SOLUTION INTRAVENOUS at 05:42

## 2024-01-01 RX ADMIN — Medication 1 APPLICATION: at 09:50

## 2024-01-01 RX ADMIN — MUPIROCIN 1 APPLICATION: 20 OINTMENT TOPICAL at 04:01

## 2024-01-01 RX ADMIN — PIPERACILLIN SODIUM AND TAZOBACTAM SODIUM 3.38 G: 3; .375 INJECTION, SOLUTION INTRAVENOUS at 12:02

## 2024-01-01 RX ADMIN — MUPIROCIN: 20 OINTMENT TOPICAL at 21:30

## 2024-01-01 RX ADMIN — PIPERACILLIN SODIUM AND TAZOBACTAM SODIUM 3.38 G: 3; .375 INJECTION, SOLUTION INTRAVENOUS at 05:37

## 2024-01-01 RX ADMIN — LEVOTHYROXINE SODIUM 88 MCG: 88 TABLET ORAL at 09:55

## 2024-01-01 RX ADMIN — RIVAROXABAN 20 MG: 20 TABLET, FILM COATED ORAL at 08:21

## 2024-01-01 RX ADMIN — CALCITRIOL CAPSULES 0.25 MCG 0.25 MCG: 0.25 CAPSULE ORAL at 10:25

## 2024-01-01 RX ADMIN — ACETAMINOPHEN 650 MG: 325 TABLET ORAL at 16:33

## 2024-01-01 RX ADMIN — POLYETHYLENE GLYCOL 3350 17 G: 17 POWDER, FOR SOLUTION ORAL at 08:59

## 2024-01-01 RX ADMIN — GENTAMICIN SULFATE: 1 OINTMENT TOPICAL at 20:51

## 2024-01-01 RX ADMIN — GABAPENTIN 100 MG: 100 CAPSULE ORAL at 20:51

## 2024-01-01 RX ADMIN — GENTAMICIN SULFATE: 1 OINTMENT TOPICAL at 20:33

## 2024-01-01 RX ADMIN — MIRTAZAPINE 15 MG: 15 TABLET, FILM COATED ORAL at 20:51

## 2024-01-01 RX ADMIN — LEVOTHYROXINE SODIUM 88 MCG: 88 TABLET ORAL at 06:31

## 2024-01-01 RX ADMIN — METOPROLOL TARTRATE 25 MG: 25 TABLET, FILM COATED ORAL at 20:28

## 2024-01-01 RX ADMIN — SODIUM CHLORIDE 50 ML/HR: 9 INJECTION, SOLUTION INTRAVENOUS at 05:29

## 2024-01-01 RX ADMIN — PIPERACILLIN SODIUM AND TAZOBACTAM SODIUM 3.38 G: 3; .375 INJECTION, SOLUTION INTRAVENOUS at 16:47

## 2024-01-01 RX ADMIN — GABAPENTIN 100 MG: 100 CAPSULE ORAL at 14:28

## 2024-01-01 RX ADMIN — GABAPENTIN 100 MG: 100 CAPSULE ORAL at 09:55

## 2024-01-01 RX ADMIN — METOPROLOL TARTRATE 25 MG: 25 TABLET, FILM COATED ORAL at 20:52

## 2024-01-01 RX ADMIN — PIPERACILLIN SODIUM AND TAZOBACTAM SODIUM 3.38 G: 3; .375 INJECTION, SOLUTION INTRAVENOUS at 05:30

## 2024-01-01 RX ADMIN — GENTAMICIN SULFATE: 1 OINTMENT TOPICAL at 15:56

## 2024-01-01 RX ADMIN — CALCITRIOL CAPSULES 0.25 MCG 0.25 MCG: 0.25 CAPSULE ORAL at 09:08

## 2024-01-01 RX ADMIN — GENTAMICIN SULFATE: 1 OINTMENT TOPICAL at 14:30

## 2024-01-01 RX ADMIN — CARBIDOPA AND LEVODOPA 0.5 TABLET: 25; 100 TABLET ORAL at 20:16

## 2024-01-01 RX ADMIN — CARBIDOPA AND LEVODOPA 0.5 TABLET: 25; 100 TABLET ORAL at 08:21

## 2024-01-01 RX ADMIN — CHOLECALCIFEROL TAB 25 MCG (1000 UNIT) 1000 UNITS: 25 TAB at 08:53

## 2024-01-01 RX ADMIN — CARBIDOPA AND LEVODOPA 0.5 TABLET: 25; 100 TABLET ORAL at 10:25

## 2024-01-01 RX ADMIN — GABAPENTIN 100 MG: 100 CAPSULE ORAL at 20:37

## 2024-01-01 RX ADMIN — PANTOPRAZOLE SODIUM 40 MG: 40 INJECTION, POWDER, FOR SOLUTION INTRAVENOUS at 05:19

## 2024-01-01 RX ADMIN — GENTAMICIN SULFATE: 1 OINTMENT TOPICAL at 09:50

## 2024-01-01 RX ADMIN — Medication: at 15:22

## 2024-01-01 RX ADMIN — PIPERACILLIN SODIUM AND TAZOBACTAM SODIUM 3.38 G: 3; .375 INJECTION, SOLUTION INTRAVENOUS at 15:56

## 2024-01-01 RX ADMIN — ACETAMINOPHEN 650 MG: 325 TABLET ORAL at 05:54

## 2024-01-01 RX ADMIN — POLYETHYLENE GLYCOL 3350 17 G: 17 POWDER, FOR SOLUTION ORAL at 10:19

## 2024-01-01 RX ADMIN — ROSUVASTATIN CALCIUM 40 MG: 10 TABLET, FILM COATED ORAL at 09:47

## 2024-01-01 RX ADMIN — VANCOMYCIN HYDROCHLORIDE 1 G: 1 INJECTION, SOLUTION INTRAVENOUS at 03:03

## 2024-01-01 RX ADMIN — Medication 1 APPLICATION: at 11:55

## 2024-01-01 RX ADMIN — MIRTAZAPINE 15 MG: 15 TABLET, FILM COATED ORAL at 20:25

## 2024-01-01 RX ADMIN — ACETAMINOPHEN 650 MG: 325 TABLET ORAL at 15:21

## 2024-01-01 RX ADMIN — HYDROCORTISONE: 1 CREAM TOPICAL at 21:30

## 2024-01-01 RX ADMIN — GABAPENTIN 100 MG: 100 CAPSULE ORAL at 14:58

## 2024-01-01 RX ADMIN — PIPERACILLIN SODIUM AND TAZOBACTAM SODIUM 3.38 G: 3; .375 INJECTION, SOLUTION INTRAVENOUS at 11:57

## 2024-01-01 RX ADMIN — PANTOPRAZOLE SODIUM 40 MG: 40 INJECTION, POWDER, FOR SOLUTION INTRAVENOUS at 05:56

## 2024-01-01 RX ADMIN — MUPIROCIN 1 APPLICATION: 20 OINTMENT TOPICAL at 21:52

## 2024-01-01 RX ADMIN — METOPROLOL TARTRATE 25 MG: 25 TABLET, FILM COATED ORAL at 21:49

## 2024-01-01 RX ADMIN — EPINEPHRINE 1 MG: 0.1 INJECTION INTRAVENOUS at 08:08

## 2024-01-01 RX ADMIN — DULOXETINE HYDROCHLORIDE 30 MG: 30 CAPSULE, DELAYED RELEASE ORAL at 08:56

## 2024-01-01 RX ADMIN — OXYCODONE HYDROCHLORIDE AND ACETAMINOPHEN 1 TABLET: 5; 325 TABLET ORAL at 20:03

## 2024-01-01 RX ADMIN — CARBIDOPA AND LEVODOPA 0.5 TABLET: 25; 100 TABLET ORAL at 21:49

## 2024-01-01 RX ADMIN — GABAPENTIN 100 MG: 100 CAPSULE ORAL at 20:25

## 2024-01-01 RX ADMIN — GENTAMICIN SULFATE: 1 OINTMENT TOPICAL at 21:56

## 2024-01-01 RX ADMIN — ROSUVASTATIN CALCIUM 40 MG: 10 TABLET, FILM COATED ORAL at 10:08

## 2024-01-01 RX ADMIN — Medication 1 APPLICATION: at 15:19

## 2024-01-01 RX ADMIN — GABAPENTIN 100 MG: 100 CAPSULE ORAL at 08:53

## 2024-01-01 RX ADMIN — POLYETHYLENE GLYCOL 3350 17 G: 17 POWDER, FOR SOLUTION ORAL at 10:25

## 2024-01-01 RX ADMIN — CARBIDOPA AND LEVODOPA 1 TABLET: 25; 100 TABLET ORAL at 15:00

## 2024-01-01 RX ADMIN — Medication: at 09:35

## 2024-01-01 RX ADMIN — PANTOPRAZOLE SODIUM 40 MG: 40 INJECTION, POWDER, FOR SOLUTION INTRAVENOUS at 06:01

## 2024-01-01 RX ADMIN — TAMSULOSIN HYDROCHLORIDE 0.4 MG: 0.4 CAPSULE ORAL at 08:08

## 2024-01-01 RX ADMIN — CALCITRIOL CAPSULES 0.25 MCG 0.25 MCG: 0.25 CAPSULE ORAL at 15:45

## 2024-01-01 RX ADMIN — GENTAMICIN SULFATE: 1 OINTMENT TOPICAL at 14:59

## 2024-01-01 RX ADMIN — PIPERACILLIN SODIUM AND TAZOBACTAM SODIUM 3.38 G: 3; .375 INJECTION, SOLUTION INTRAVENOUS at 14:39

## 2024-01-01 RX ADMIN — METOPROLOL TARTRATE 25 MG: 25 TABLET, FILM COATED ORAL at 08:08

## 2024-01-01 RX ADMIN — EPINEPHRINE 1 MG: 0.1 INJECTION INTRAVENOUS at 07:56

## 2024-01-01 RX ADMIN — Medication 1 APPLICATION: at 10:02

## 2024-01-01 RX ADMIN — ROSUVASTATIN CALCIUM 40 MG: 10 TABLET, FILM COATED ORAL at 10:24

## 2024-01-01 RX ADMIN — TRAMADOL HYDROCHLORIDE 50 MG: 50 TABLET, COATED ORAL at 12:10

## 2024-01-01 RX ADMIN — OXYCODONE HYDROCHLORIDE AND ACETAMINOPHEN 1 TABLET: 5; 325 TABLET ORAL at 20:44

## 2024-01-01 RX ADMIN — GABAPENTIN 100 MG: 100 CAPSULE ORAL at 22:35

## 2024-01-01 RX ADMIN — CHOLECALCIFEROL TAB 25 MCG (1000 UNIT) 1000 UNITS: 25 TAB at 09:08

## 2024-01-01 RX ADMIN — CHOLECALCIFEROL TAB 25 MCG (1000 UNIT) 1000 UNITS: 25 TAB at 09:54

## 2024-01-01 RX ADMIN — METOPROLOL TARTRATE 25 MG: 25 TABLET, FILM COATED ORAL at 20:50

## 2024-01-01 RX ADMIN — TAMSULOSIN HYDROCHLORIDE 0.4 MG: 0.4 CAPSULE ORAL at 08:55

## 2024-01-01 RX ADMIN — SODIUM HYPOCHLORITE: 5 SOLUTION TOPICAL at 21:30

## 2024-01-01 RX ADMIN — Medication 1 APPLICATION: at 10:18

## 2024-01-01 RX ADMIN — POTASSIUM CHLORIDE 20 MEQ: 14.9 INJECTION, SOLUTION INTRAVENOUS at 18:31

## 2024-01-01 RX ADMIN — POTASSIUM CHLORIDE 20 MEQ: 14.9 INJECTION, SOLUTION INTRAVENOUS at 17:00

## 2024-01-01 RX ADMIN — CARBIDOPA AND LEVODOPA 0.5 TABLET: 25; 100 TABLET ORAL at 09:08

## 2024-01-01 RX ADMIN — PIPERACILLIN SODIUM AND TAZOBACTAM SODIUM 3.38 G: 3; .375 INJECTION, SOLUTION INTRAVENOUS at 22:35

## 2024-01-01 RX ADMIN — NALOXONE HYDROCHLORIDE 0.4 MG: 1 INJECTION PARENTERAL at 17:55

## 2024-01-01 RX ADMIN — EPINEPHRINE 1 MG: 0.1 INJECTION INTRAVENOUS at 07:53

## 2024-01-01 RX ADMIN — METOPROLOL TARTRATE 25 MG: 25 TABLET, FILM COATED ORAL at 09:55

## 2024-01-01 RX ADMIN — CARBIDOPA AND LEVODOPA 1 TABLET: 25; 100 TABLET ORAL at 08:56

## 2024-01-01 RX ADMIN — Medication 1 APPLICATION: at 20:52

## 2024-01-01 RX ADMIN — GABAPENTIN 100 MG: 100 CAPSULE ORAL at 15:14

## 2024-01-01 RX ADMIN — PIPERACILLIN SODIUM AND TAZOBACTAM SODIUM 3.38 G: 3; .375 INJECTION, SOLUTION INTRAVENOUS at 06:16

## 2024-01-01 RX ADMIN — Medication: at 20:34

## 2024-01-01 RX ADMIN — CARBIDOPA AND LEVODOPA 0.5 TABLET: 25; 100 TABLET ORAL at 21:55

## 2024-01-01 RX ADMIN — HYDROCORTISONE: 1 CREAM TOPICAL at 08:53

## 2024-01-01 RX ADMIN — Medication: at 20:16

## 2024-01-01 RX ADMIN — METOPROLOL TARTRATE 25 MG: 25 TABLET, FILM COATED ORAL at 10:17

## 2024-01-01 RX ADMIN — PIPERACILLIN SODIUM AND TAZOBACTAM SODIUM 3.38 G: 3; .375 INJECTION, SOLUTION INTRAVENOUS at 11:54

## 2024-01-01 RX ADMIN — PIPERACILLIN SODIUM AND TAZOBACTAM SODIUM 3.38 G: 3; .375 INJECTION, SOLUTION INTRAVENOUS at 21:52

## 2024-01-01 RX ADMIN — METOPROLOL TARTRATE 25 MG: 25 TABLET, FILM COATED ORAL at 20:25

## 2024-01-01 RX ADMIN — TRAMADOL HYDROCHLORIDE 50 MG: 50 TABLET, COATED ORAL at 15:42

## 2024-01-01 RX ADMIN — Medication: at 09:08

## 2024-01-01 RX ADMIN — PANTOPRAZOLE SODIUM 40 MG: 40 INJECTION, POWDER, FOR SOLUTION INTRAVENOUS at 06:49

## 2024-01-01 RX ADMIN — GENTAMICIN SULFATE: 1 OINTMENT TOPICAL at 09:08

## 2024-01-01 RX ADMIN — PIPERACILLIN SODIUM AND TAZOBACTAM SODIUM 3.38 G: 3; .375 INJECTION, SOLUTION INTRAVENOUS at 23:44

## 2024-01-01 RX ADMIN — CARBIDOPA AND LEVODOPA 1 TABLET: 25; 100 TABLET ORAL at 21:29

## 2024-01-01 RX ADMIN — HYDROCORTISONE: 1 CREAM TOPICAL at 20:50

## 2024-01-01 RX ADMIN — CHOLECALCIFEROL TAB 25 MCG (1000 UNIT) 1000 UNITS: 25 TAB at 08:56

## 2024-01-01 RX ADMIN — SODIUM CHLORIDE, SODIUM LACTATE, POTASSIUM CHLORIDE, AND CALCIUM CHLORIDE: .6; .31; .03; .02 INJECTION, SOLUTION INTRAVENOUS at 11:07

## 2024-01-01 RX ADMIN — POLYETHYLENE GLYCOL 3350 17 G: 17 POWDER, FOR SOLUTION ORAL at 09:35

## 2024-01-01 RX ADMIN — LEVOTHYROXINE SODIUM 88 MCG: 88 TABLET ORAL at 06:16

## 2024-01-01 RX ADMIN — VANCOMYCIN HYDROCHLORIDE 1.5 G: 1.5 INJECTION, POWDER, LYOPHILIZED, FOR SOLUTION INTRAVENOUS at 00:29

## 2024-01-01 RX ADMIN — ACETAMINOPHEN 650 MG: 325 TABLET ORAL at 09:03

## 2024-01-01 RX ADMIN — LEVOTHYROXINE SODIUM 88 MCG: 88 TABLET ORAL at 06:50

## 2024-01-01 RX ADMIN — OXYCODONE HYDROCHLORIDE AND ACETAMINOPHEN 1 TABLET: 5; 325 TABLET ORAL at 09:32

## 2024-01-01 RX ADMIN — OXYCODONE HYDROCHLORIDE AND ACETAMINOPHEN 1 TABLET: 5; 325 TABLET ORAL at 09:07

## 2024-01-01 RX ADMIN — PIPERACILLIN SODIUM AND TAZOBACTAM SODIUM 3.38 G: 3; .375 INJECTION, SOLUTION INTRAVENOUS at 14:21

## 2024-01-01 RX ADMIN — Medication 1 APPLICATION: at 11:47

## 2024-01-01 RX ADMIN — ROSUVASTATIN CALCIUM 40 MG: 10 TABLET, FILM COATED ORAL at 08:52

## 2024-01-01 RX ADMIN — CARBIDOPA AND LEVODOPA 0.5 TABLET: 25; 100 TABLET ORAL at 20:54

## 2024-01-01 RX ADMIN — METOPROLOL TARTRATE 25 MG: 25 TABLET, FILM COATED ORAL at 10:25

## 2024-01-01 RX ADMIN — CALCITRIOL CAPSULES 0.25 MCG 0.25 MCG: 0.25 CAPSULE ORAL at 09:09

## 2024-01-01 RX ADMIN — GABAPENTIN 100 MG: 100 CAPSULE ORAL at 20:56

## 2024-01-01 RX ADMIN — OXYCODONE HYDROCHLORIDE AND ACETAMINOPHEN 1 TABLET: 5; 325 TABLET ORAL at 16:10

## 2024-01-01 RX ADMIN — PANTOPRAZOLE SODIUM 40 MG: 40 INJECTION, POWDER, FOR SOLUTION INTRAVENOUS at 06:16

## 2024-01-01 RX ADMIN — SODIUM HYPOCHLORITE: 5 SOLUTION TOPICAL at 08:09

## 2024-01-01 RX ADMIN — RIVAROXABAN 20 MG: 20 TABLET, FILM COATED ORAL at 10:07

## 2024-01-01 RX ADMIN — METOPROLOL TARTRATE 5 MG: 5 INJECTION INTRAVENOUS at 16:31

## 2024-01-01 RX ADMIN — GABAPENTIN 100 MG: 100 CAPSULE ORAL at 15:56

## 2024-01-01 RX ADMIN — MUPIROCIN 1 APPLICATION: 20 OINTMENT TOPICAL at 20:56

## 2024-01-01 RX ADMIN — GABAPENTIN 100 MG: 100 CAPSULE ORAL at 08:13

## 2024-01-01 RX ADMIN — PIPERACILLIN SODIUM AND TAZOBACTAM SODIUM 3.38 G: 3; .375 INJECTION, SOLUTION INTRAVENOUS at 16:28

## 2024-01-01 RX ADMIN — GABAPENTIN 100 MG: 100 CAPSULE ORAL at 21:17

## 2024-01-01 RX ADMIN — TAMSULOSIN HYDROCHLORIDE 0.4 MG: 0.4 CAPSULE ORAL at 10:07

## 2024-01-01 RX ADMIN — METOPROLOL TARTRATE 25 MG: 25 TABLET, FILM COATED ORAL at 20:44

## 2024-01-01 RX ADMIN — EPINEPHRINE 1 MG: 0.1 INJECTION INTRAVENOUS at 07:50

## 2024-01-01 RX ADMIN — CARBIDOPA AND LEVODOPA 1 TABLET: 25; 100 TABLET ORAL at 08:53

## 2024-01-01 RX ADMIN — MUPIROCIN 1 APPLICATION: 20 OINTMENT TOPICAL at 20:51

## 2024-01-01 RX ADMIN — PIPERACILLIN SODIUM AND TAZOBACTAM SODIUM 4.5 G: 4; .5 INJECTION, SOLUTION INTRAVENOUS at 23:50

## 2024-01-01 RX ADMIN — PIPERACILLIN SODIUM AND TAZOBACTAM SODIUM 4.5 G: 4; .5 INJECTION, SOLUTION INTRAVENOUS at 01:28

## 2024-01-01 RX ADMIN — TRAMADOL HYDROCHLORIDE 50 MG: 50 TABLET, COATED ORAL at 11:55

## 2024-01-01 RX ADMIN — Medication 1 APPLICATION: at 20:03

## 2024-01-01 RX ADMIN — METOPROLOL TARTRATE 25 MG: 25 TABLET, FILM COATED ORAL at 08:56

## 2024-01-01 RX ADMIN — METOPROLOL TARTRATE 25 MG: 25 TABLET, FILM COATED ORAL at 20:16

## 2024-01-01 RX ADMIN — POLYETHYLENE GLYCOL 3350 17 G: 17 POWDER, FOR SOLUTION ORAL at 17:02

## 2024-01-01 RX ADMIN — CARBIDOPA AND LEVODOPA 0.5 TABLET: 25; 100 TABLET ORAL at 15:52

## 2024-01-01 RX ADMIN — MIRTAZAPINE 15 MG: 15 TABLET, FILM COATED ORAL at 20:16

## 2024-01-01 RX ADMIN — GENTAMICIN SULFATE: 1 OINTMENT TOPICAL at 20:56

## 2024-01-01 RX ADMIN — CALCITRIOL CAPSULES 0.25 MCG 0.25 MCG: 0.25 CAPSULE ORAL at 08:08

## 2024-01-01 RX ADMIN — Medication 1 APPLICATION: at 10:26

## 2024-01-01 RX ADMIN — ROSUVASTATIN CALCIUM 40 MG: 10 TABLET, FILM COATED ORAL at 08:13

## 2024-01-01 RX ADMIN — INSULIN LISPRO 4 UNITS: 100 INJECTION, SOLUTION INTRAVENOUS; SUBCUTANEOUS at 17:49

## 2024-01-01 RX ADMIN — SODIUM BICARBONATE 50 MEQ: 84 INJECTION, SOLUTION INTRAVENOUS at 07:48

## 2024-01-01 RX ADMIN — DULOXETINE HYDROCHLORIDE 30 MG: 30 CAPSULE, DELAYED RELEASE ORAL at 08:53

## 2024-01-01 RX ADMIN — RIVAROXABAN 20 MG: 20 TABLET, FILM COATED ORAL at 10:25

## 2024-01-01 RX ADMIN — GABAPENTIN 100 MG: 100 CAPSULE ORAL at 20:28

## 2024-01-01 RX ADMIN — PIPERACILLIN SODIUM AND TAZOBACTAM SODIUM 3.38 G: 3; .375 INJECTION, SOLUTION INTRAVENOUS at 00:30

## 2024-01-01 RX ADMIN — DULOXETINE HYDROCHLORIDE 30 MG: 30 CAPSULE, DELAYED RELEASE ORAL at 08:08

## 2024-01-01 RX ADMIN — GABAPENTIN 100 MG: 100 CAPSULE ORAL at 15:21

## 2024-01-01 RX ADMIN — METOPROLOL TARTRATE 25 MG: 25 TABLET, FILM COATED ORAL at 08:33

## 2024-01-01 RX ADMIN — CARBIDOPA AND LEVODOPA 1 TABLET: 25; 100 TABLET ORAL at 20:50

## 2024-01-01 RX ADMIN — Medication: at 20:46

## 2024-01-01 RX ADMIN — CARBIDOPA AND LEVODOPA 0.5 TABLET: 25; 100 TABLET ORAL at 20:55

## 2024-01-01 RX ADMIN — POLYETHYLENE GLYCOL 3350 17 G: 17 POWDER, FOR SOLUTION ORAL at 08:34

## 2024-01-01 RX ADMIN — Medication: at 09:54

## 2024-01-01 RX ADMIN — INSULIN LISPRO 2 UNITS: 100 INJECTION, SOLUTION INTRAVENOUS; SUBCUTANEOUS at 11:55

## 2024-01-01 RX ADMIN — MUPIROCIN: 20 OINTMENT TOPICAL at 22:36

## 2024-01-01 RX ADMIN — Medication: at 10:01

## 2024-01-01 RX ADMIN — PIPERACILLIN SODIUM AND TAZOBACTAM SODIUM 3.38 G: 3; .375 INJECTION, SOLUTION INTRAVENOUS at 15:20

## 2024-01-01 RX ADMIN — METOPROLOL TARTRATE 25 MG: 25 TABLET, FILM COATED ORAL at 09:46

## 2024-01-01 RX ADMIN — MUPIROCIN 1 APPLICATION: 20 OINTMENT TOPICAL at 09:08

## 2024-01-01 RX ADMIN — BACLOFEN 5 MG: 10 TABLET ORAL at 22:45

## 2024-01-01 RX ADMIN — CHOLECALCIFEROL TAB 25 MCG (1000 UNIT) 1000 UNITS: 25 TAB at 09:56

## 2024-01-01 RX ADMIN — METOPROLOL TARTRATE 25 MG: 25 TABLET, FILM COATED ORAL at 21:17

## 2024-01-01 RX ADMIN — SODIUM HYPOCHLORITE: 5 SOLUTION TOPICAL at 22:36

## 2024-01-01 RX ADMIN — RIVAROXABAN 20 MG: 20 TABLET, FILM COATED ORAL at 08:53

## 2024-01-01 RX ADMIN — PIPERACILLIN SODIUM AND TAZOBACTAM SODIUM 3.38 G: 3; .375 INJECTION, SOLUTION INTRAVENOUS at 18:06

## 2024-01-01 RX ADMIN — SODIUM CHLORIDE 100 ML/HR: 9 INJECTION, SOLUTION INTRAVENOUS at 21:46

## 2024-01-01 RX ADMIN — METOPROLOL TARTRATE 25 MG: 25 TABLET, FILM COATED ORAL at 09:53

## 2024-01-01 RX ADMIN — INSULIN LISPRO 2 UNITS: 100 INJECTION, SOLUTION INTRAVENOUS; SUBCUTANEOUS at 17:27

## 2024-01-01 RX ADMIN — LEVOTHYROXINE SODIUM 88 MCG: 88 TABLET ORAL at 08:33

## 2024-01-01 RX ADMIN — Medication: at 10:26

## 2024-01-01 RX ADMIN — TRAMADOL HYDROCHLORIDE 50 MG: 50 TABLET, COATED ORAL at 18:51

## 2024-01-01 RX ADMIN — Medication: at 09:50

## 2024-01-01 RX ADMIN — PROPOFOL 10 MG: 10 INJECTION, EMULSION INTRAVENOUS at 11:19

## 2024-01-01 RX ADMIN — CARBIDOPA AND LEVODOPA 0.5 TABLET: 25; 100 TABLET ORAL at 20:28

## 2024-01-01 RX ADMIN — METOPROLOL TARTRATE 25 MG: 25 TABLET, FILM COATED ORAL at 08:21

## 2024-01-01 RX ADMIN — Medication: at 20:03

## 2024-01-01 RX ADMIN — OXYCODONE HYDROCHLORIDE AND ACETAMINOPHEN 1 TABLET: 5; 325 TABLET ORAL at 21:29

## 2024-01-01 RX ADMIN — PIPERACILLIN SODIUM AND TAZOBACTAM SODIUM 3.38 G: 3; .375 INJECTION, SOLUTION INTRAVENOUS at 04:59

## 2024-01-01 RX ADMIN — PIPERACILLIN SODIUM AND TAZOBACTAM SODIUM 3.38 G: 3; .375 INJECTION, SOLUTION INTRAVENOUS at 22:29

## 2024-01-01 RX ADMIN — LEVOTHYROXINE SODIUM 88 MCG: 88 TABLET ORAL at 05:36

## 2024-01-01 RX ADMIN — MIRTAZAPINE 15 MG: 15 TABLET, FILM COATED ORAL at 21:55

## 2024-01-01 RX ADMIN — VANCOMYCIN HYDROCHLORIDE 1.25 G: 1.25 INJECTION, POWDER, LYOPHILIZED, FOR SOLUTION INTRAVENOUS at 12:10

## 2024-01-01 RX ADMIN — POLYETHYLENE GLYCOL 3350 17 G: 17 POWDER, FOR SOLUTION ORAL at 09:55

## 2024-01-01 RX ADMIN — GABAPENTIN 100 MG: 100 CAPSULE ORAL at 15:18

## 2024-01-01 RX ADMIN — PIPERACILLIN SODIUM AND TAZOBACTAM SODIUM 3.38 G: 3; .375 INJECTION, SOLUTION INTRAVENOUS at 00:40

## 2024-01-01 RX ADMIN — PIPERACILLIN SODIUM AND TAZOBACTAM SODIUM 3.38 G: 3; .375 INJECTION, SOLUTION INTRAVENOUS at 05:56

## 2024-01-01 RX ADMIN — GENTAMICIN SULFATE: 1 OINTMENT TOPICAL at 04:00

## 2024-01-01 RX ADMIN — PIPERACILLIN SODIUM AND TAZOBACTAM SODIUM 3.38 G: 3; .375 INJECTION, SOLUTION INTRAVENOUS at 21:55

## 2024-01-01 RX ADMIN — OXYCODONE HYDROCHLORIDE AND ACETAMINOPHEN 1 TABLET: 5; 325 TABLET ORAL at 15:52

## 2024-01-01 RX ADMIN — MIRTAZAPINE 15 MG: 15 TABLET, FILM COATED ORAL at 20:56

## 2024-01-01 RX ADMIN — PIPERACILLIN SODIUM AND TAZOBACTAM SODIUM 3.38 G: 3; .375 INJECTION, SOLUTION INTRAVENOUS at 05:08

## 2024-01-01 RX ADMIN — POLYETHYLENE GLYCOL 3350 17 G: 17 POWDER, FOR SOLUTION ORAL at 09:07

## 2024-01-01 RX ADMIN — CARBIDOPA AND LEVODOPA 0.5 TABLET: 25; 100 TABLET ORAL at 10:16

## 2024-01-01 RX ADMIN — METOPROLOL TARTRATE 25 MG: 25 TABLET, FILM COATED ORAL at 09:33

## 2024-01-01 RX ADMIN — PIPERACILLIN SODIUM AND TAZOBACTAM SODIUM 3.38 G: 3; .375 INJECTION, SOLUTION INTRAVENOUS at 11:45

## 2024-01-01 RX ADMIN — ROSUVASTATIN CALCIUM 40 MG: 10 TABLET, FILM COATED ORAL at 09:07

## 2024-01-01 RX ADMIN — GABAPENTIN 100 MG: 100 CAPSULE ORAL at 20:50

## 2024-01-01 RX ADMIN — Medication: at 20:56

## 2024-01-01 RX ADMIN — GABAPENTIN 100 MG: 100 CAPSULE ORAL at 20:43

## 2024-01-01 RX ADMIN — GABAPENTIN 100 MG: 100 CAPSULE ORAL at 09:46

## 2024-01-01 RX ADMIN — CHOLECALCIFEROL TAB 25 MCG (1000 UNIT) 1000 UNITS: 25 TAB at 08:22

## 2024-01-01 RX ADMIN — AZITHROMYCIN 500 MG: 500 INJECTION, POWDER, LYOPHILIZED, FOR SOLUTION INTRAVENOUS at 23:41

## 2024-01-01 RX ADMIN — RIVAROXABAN 20 MG: 20 TABLET, FILM COATED ORAL at 09:33

## 2024-01-01 RX ADMIN — SODIUM HYPOCHLORITE: 5 SOLUTION TOPICAL at 20:50

## 2024-01-01 RX ADMIN — PIPERACILLIN SODIUM AND TAZOBACTAM SODIUM 3.38 G: 3; .375 INJECTION, SOLUTION INTRAVENOUS at 04:03

## 2024-01-01 RX ADMIN — CARBIDOPA AND LEVODOPA 0.5 TABLET: 25; 100 TABLET ORAL at 20:50

## 2024-01-01 RX ADMIN — PIPERACILLIN SODIUM AND TAZOBACTAM SODIUM 3.38 G: 3; .375 INJECTION, SOLUTION INTRAVENOUS at 23:50

## 2024-01-01 RX ADMIN — GABAPENTIN 100 MG: 100 CAPSULE ORAL at 09:32

## 2024-01-01 RX ADMIN — CARBIDOPA AND LEVODOPA 1 TABLET: 25; 100 TABLET ORAL at 22:35

## 2024-01-01 RX ADMIN — EPINEPHRINE 1 MG: 0.1 INJECTION INTRAVENOUS at 07:47

## 2024-01-01 RX ADMIN — CARBIDOPA AND LEVODOPA 0.5 TABLET: 25; 100 TABLET ORAL at 09:47

## 2024-01-01 RX ADMIN — CHOLECALCIFEROL TAB 25 MCG (1000 UNIT) 1000 UNITS: 25 TAB at 09:46

## 2024-01-01 RX ADMIN — Medication 1 APPLICATION: at 11:30

## 2024-01-01 RX ADMIN — PIPERACILLIN SODIUM AND TAZOBACTAM SODIUM 3.38 G: 3; .375 INJECTION, SOLUTION INTRAVENOUS at 05:18

## 2024-01-01 RX ADMIN — PROPOFOL 100 MCG/KG/MIN: 10 INJECTION, EMULSION INTRAVENOUS at 11:09

## 2024-01-01 RX ADMIN — PIPERACILLIN SODIUM AND TAZOBACTAM SODIUM 3.38 G: 3; .375 INJECTION, SOLUTION INTRAVENOUS at 16:31

## 2024-01-01 RX ADMIN — PROPOFOL 30 MG: 10 INJECTION, EMULSION INTRAVENOUS at 11:11

## 2024-01-01 RX ADMIN — GABAPENTIN 100 MG: 100 CAPSULE ORAL at 08:08

## 2024-01-01 RX ADMIN — TRAMADOL HYDROCHLORIDE 50 MG: 50 TABLET, COATED ORAL at 08:14

## 2024-01-01 RX ADMIN — CARBIDOPA AND LEVODOPA 0.5 TABLET: 25; 100 TABLET ORAL at 14:58

## 2024-01-01 RX ADMIN — GENTAMICIN SULFATE: 1 OINTMENT TOPICAL at 20:03

## 2024-01-01 RX ADMIN — PANTOPRAZOLE SODIUM 40 MG: 40 INJECTION, POWDER, FOR SOLUTION INTRAVENOUS at 06:28

## 2024-01-01 RX ADMIN — PIPERACILLIN SODIUM AND TAZOBACTAM SODIUM 3.38 G: 3; .375 INJECTION, SOLUTION INTRAVENOUS at 12:19

## 2024-01-01 RX ADMIN — PANTOPRAZOLE SODIUM 40 MG: 40 INJECTION, POWDER, FOR SOLUTION INTRAVENOUS at 05:08

## 2024-01-01 RX ADMIN — ROSUVASTATIN CALCIUM 40 MG: 10 TABLET, FILM COATED ORAL at 15:43

## 2024-01-01 RX ADMIN — INSULIN LISPRO 2 UNITS: 100 INJECTION, SOLUTION INTRAVENOUS; SUBCUTANEOUS at 13:16

## 2024-01-01 RX ADMIN — GABAPENTIN 100 MG: 100 CAPSULE ORAL at 14:23

## 2024-01-01 RX ADMIN — DULOXETINE HYDROCHLORIDE 30 MG: 30 CAPSULE, DELAYED RELEASE ORAL at 10:07

## 2024-01-01 RX ADMIN — OXYCODONE HYDROCHLORIDE AND ACETAMINOPHEN 1 TABLET: 5; 325 TABLET ORAL at 09:55

## 2024-01-01 RX ADMIN — GENTAMICIN SULFATE: 1 OINTMENT TOPICAL at 09:54

## 2024-01-01 RX ADMIN — PIPERACILLIN SODIUM AND TAZOBACTAM SODIUM 3.38 G: 3; .375 INJECTION, SOLUTION INTRAVENOUS at 21:46

## 2024-01-01 RX ADMIN — PIPERACILLIN SODIUM AND TAZOBACTAM SODIUM 3.38 G: 3; .375 INJECTION, SOLUTION INTRAVENOUS at 16:59

## 2024-01-01 RX ADMIN — RIVAROXABAN 20 MG: 20 TABLET, FILM COATED ORAL at 08:56

## 2024-01-01 RX ADMIN — Medication 1 APPLICATION: at 21:56

## 2024-01-01 RX ADMIN — METOPROLOL TARTRATE 25 MG: 25 TABLET, FILM COATED ORAL at 10:07

## 2024-01-01 RX ADMIN — OXYCODONE HYDROCHLORIDE AND ACETAMINOPHEN 1 TABLET: 5; 325 TABLET ORAL at 10:25

## 2024-01-01 RX ADMIN — HYDROCORTISONE: 1 CREAM TOPICAL at 21:20

## 2024-01-01 RX ADMIN — CARBIDOPA AND LEVODOPA 0.5 TABLET: 25; 100 TABLET ORAL at 09:55

## 2024-01-01 RX ADMIN — TRAMADOL HYDROCHLORIDE 50 MG: 50 TABLET, COATED ORAL at 09:48

## 2024-01-01 RX ADMIN — INSULIN LISPRO 2 UNITS: 100 INJECTION, SOLUTION INTRAVENOUS; SUBCUTANEOUS at 21:32

## 2024-01-01 RX ADMIN — RIVAROXABAN 20 MG: 20 TABLET, FILM COATED ORAL at 09:07

## 2024-01-01 RX ADMIN — ROSUVASTATIN CALCIUM 40 MG: 10 TABLET, FILM COATED ORAL at 10:16

## 2024-01-01 RX ADMIN — CARBIDOPA AND LEVODOPA 0.5 TABLET: 25; 100 TABLET ORAL at 21:00

## 2024-01-01 RX ADMIN — PANTOPRAZOLE SODIUM 40 MG: 40 INJECTION, POWDER, FOR SOLUTION INTRAVENOUS at 05:00

## 2024-01-01 RX ADMIN — CARBIDOPA AND LEVODOPA 1 TABLET: 25; 100 TABLET ORAL at 08:08

## 2024-01-01 RX ADMIN — SODIUM BICARBONATE 50 MEQ: 84 INJECTION, SOLUTION INTRAVENOUS at 07:57

## 2024-01-01 RX ADMIN — LEVOTHYROXINE SODIUM 88 MCG: 88 TABLET ORAL at 06:01

## 2024-01-01 RX ADMIN — HYDROCORTISONE: 1 CREAM TOPICAL at 09:02

## 2024-01-01 RX ADMIN — GENTAMICIN SULFATE: 1 OINTMENT TOPICAL at 20:16

## 2024-01-01 RX ADMIN — GABAPENTIN 100 MG: 100 CAPSULE ORAL at 20:16

## 2024-01-01 RX ADMIN — PIPERACILLIN SODIUM AND TAZOBACTAM SODIUM 3.38 G: 3; .375 INJECTION, SOLUTION INTRAVENOUS at 12:25

## 2024-01-01 RX ADMIN — Medication 1 APPLICATION: at 20:56

## 2024-01-01 RX ADMIN — CHOLECALCIFEROL TAB 25 MCG (1000 UNIT) 1000 UNITS: 25 TAB at 09:33

## 2024-01-01 RX ADMIN — PIPERACILLIN SODIUM AND TAZOBACTAM SODIUM 3.38 G: 3; .375 INJECTION, SOLUTION INTRAVENOUS at 15:54

## 2024-01-01 RX ADMIN — BACLOFEN 5 MG: 10 TABLET ORAL at 01:46

## 2024-01-01 RX ADMIN — CHOLECALCIFEROL TAB 25 MCG (1000 UNIT) 1000 UNITS: 25 TAB at 08:33

## 2024-01-01 RX ADMIN — CARBIDOPA AND LEVODOPA 0.5 TABLET: 25; 100 TABLET ORAL at 09:32

## 2024-01-01 RX ADMIN — CARBIDOPA AND LEVODOPA 0.5 TABLET: 25; 100 TABLET ORAL at 20:36

## 2024-01-01 RX ADMIN — CARBIDOPA AND LEVODOPA 0.5 TABLET: 25; 100 TABLET ORAL at 20:25

## 2024-01-01 RX ADMIN — TRAMADOL HYDROCHLORIDE 50 MG: 50 TABLET, COATED ORAL at 14:59

## 2024-01-01 RX ADMIN — PIPERACILLIN SODIUM AND TAZOBACTAM SODIUM 3.38 G: 3; .375 INJECTION, SOLUTION INTRAVENOUS at 03:59

## 2024-01-01 RX ADMIN — OXYCODONE HYDROCHLORIDE AND ACETAMINOPHEN 1 TABLET: 5; 325 TABLET ORAL at 08:33

## 2024-01-01 RX ADMIN — GABAPENTIN 100 MG: 100 CAPSULE ORAL at 10:07

## 2024-01-01 RX ADMIN — EPINEPHRINE 1 MG: 0.1 INJECTION INTRAVENOUS at 08:11

## 2024-01-01 RX ADMIN — RIVAROXABAN 20 MG: 20 TABLET, FILM COATED ORAL at 09:56

## 2024-01-01 RX ADMIN — OXYCODONE HYDROCHLORIDE AND ACETAMINOPHEN 1 TABLET: 5; 325 TABLET ORAL at 20:36

## 2024-01-01 RX ADMIN — GENTAMICIN SULFATE: 1 OINTMENT TOPICAL at 15:22

## 2024-01-01 RX ADMIN — CARBIDOPA AND LEVODOPA 1 TABLET: 25; 100 TABLET ORAL at 15:27

## 2024-01-01 RX ADMIN — CHOLECALCIFEROL TAB 25 MCG (1000 UNIT) 1000 UNITS: 25 TAB at 15:44

## 2024-01-01 RX ADMIN — Medication: at 15:29

## 2024-01-01 RX ADMIN — CARBIDOPA AND LEVODOPA 0.5 TABLET: 25; 100 TABLET ORAL at 15:21

## 2024-01-01 RX ADMIN — GENTAMICIN SULFATE: 1 OINTMENT TOPICAL at 09:33

## 2024-01-01 RX ADMIN — GABAPENTIN 100 MG: 100 CAPSULE ORAL at 21:49

## 2024-01-01 RX ADMIN — Medication 1 APPLICATION: at 20:46

## 2024-01-01 RX ADMIN — GABAPENTIN 100 MG: 100 CAPSULE ORAL at 08:21

## 2024-01-01 RX ADMIN — Medication: at 08:15

## 2024-01-01 RX ADMIN — EPINEPHRINE 1 MG: 0.1 INJECTION INTRAVENOUS at 08:02

## 2024-01-01 RX ADMIN — RIVAROXABAN 20 MG: 20 TABLET, FILM COATED ORAL at 09:46

## 2024-01-01 RX ADMIN — CALCITRIOL CAPSULES 0.25 MCG 0.25 MCG: 0.25 CAPSULE ORAL at 09:55

## 2024-01-01 RX ADMIN — Medication: at 04:01

## 2024-01-01 RX ADMIN — PIPERACILLIN SODIUM AND TAZOBACTAM SODIUM 3.38 G: 3; .375 INJECTION, SOLUTION INTRAVENOUS at 00:14

## 2024-01-01 RX ADMIN — SODIUM CHLORIDE 1500 ML: 9 INJECTION, SOLUTION INTRAVENOUS at 01:28

## 2024-01-01 RX ADMIN — CALCITRIOL CAPSULES 0.25 MCG 0.25 MCG: 0.25 CAPSULE ORAL at 08:21

## 2024-01-01 RX ADMIN — CARBIDOPA AND LEVODOPA 0.5 TABLET: 25; 100 TABLET ORAL at 15:54

## 2024-01-01 RX ADMIN — CARBIDOPA AND LEVODOPA 0.5 TABLET: 25; 100 TABLET ORAL at 20:43

## 2024-01-01 RX ADMIN — CHOLECALCIFEROL TAB 25 MCG (1000 UNIT) 1000 UNITS: 25 TAB at 09:55

## 2024-01-01 RX ADMIN — SODIUM HYPOCHLORITE: 5 SOLUTION TOPICAL at 09:01

## 2024-01-01 RX ADMIN — ACETAMINOPHEN 650 MG: 325 TABLET ORAL at 04:25

## 2024-01-01 RX ADMIN — Medication: at 20:52

## 2024-01-01 RX ADMIN — METOPROLOL TARTRATE 25 MG: 25 TABLET, FILM COATED ORAL at 08:52

## 2024-01-01 RX ADMIN — Medication 1 APPLICATION: at 09:54

## 2024-01-01 RX ADMIN — CARBIDOPA AND LEVODOPA 0.5 TABLET: 25; 100 TABLET ORAL at 15:45

## 2024-01-01 RX ADMIN — GENTAMICIN SULFATE: 1 OINTMENT TOPICAL at 10:01

## 2024-01-01 RX ADMIN — MUPIROCIN: 20 OINTMENT TOPICAL at 08:10

## 2024-01-01 RX ADMIN — PIPERACILLIN SODIUM AND TAZOBACTAM SODIUM 3.38 G: 3; .375 INJECTION, SOLUTION INTRAVENOUS at 04:53

## 2024-01-01 RX ADMIN — METOPROLOL TARTRATE 25 MG: 25 TABLET, FILM COATED ORAL at 22:35

## 2024-01-01 RX ADMIN — OXYCODONE HYDROCHLORIDE AND ACETAMINOPHEN 1 TABLET: 5; 325 TABLET ORAL at 08:21

## 2024-01-01 RX ADMIN — RIVAROXABAN 20 MG: 20 TABLET, FILM COATED ORAL at 08:09

## 2024-01-01 RX ADMIN — METOPROLOL TARTRATE 25 MG: 25 TABLET, FILM COATED ORAL at 20:37

## 2024-01-01 RX ADMIN — GABAPENTIN 100 MG: 100 CAPSULE ORAL at 08:56

## 2024-01-01 RX ADMIN — INSULIN LISPRO 2 UNITS: 100 INJECTION, SOLUTION INTRAVENOUS; SUBCUTANEOUS at 11:17

## 2024-01-01 RX ADMIN — MIRTAZAPINE 15 MG: 15 TABLET, FILM COATED ORAL at 20:50

## 2024-01-01 RX ADMIN — VANCOMYCIN HYDROCHLORIDE 1.25 G: 1.25 INJECTION, POWDER, LYOPHILIZED, FOR SOLUTION INTRAVENOUS at 00:54

## 2024-01-01 RX ADMIN — TAMSULOSIN HYDROCHLORIDE 0.4 MG: 0.4 CAPSULE ORAL at 08:52

## 2024-01-01 RX ADMIN — MUPIROCIN: 20 OINTMENT TOPICAL at 09:01

## 2024-01-01 RX ADMIN — CHOLECALCIFEROL TAB 25 MCG (1000 UNIT) 1000 UNITS: 25 TAB at 08:13

## 2024-01-01 RX ADMIN — PIPERACILLIN SODIUM AND TAZOBACTAM SODIUM 3.38 G: 3; .375 INJECTION, SOLUTION INTRAVENOUS at 21:47

## 2024-01-01 RX ADMIN — Medication 1 APPLICATION: at 20:33

## 2024-01-01 RX ADMIN — METOPROLOL TARTRATE 25 MG: 25 TABLET, FILM COATED ORAL at 16:10

## 2024-01-01 RX ADMIN — CARBIDOPA AND LEVODOPA 0.5 TABLET: 25; 100 TABLET ORAL at 21:29

## 2024-01-01 RX ADMIN — ROSUVASTATIN CALCIUM 40 MG: 10 TABLET, FILM COATED ORAL at 08:58

## 2024-01-01 RX ADMIN — Medication: at 14:30

## 2024-01-01 RX ADMIN — GABAPENTIN 100 MG: 100 CAPSULE ORAL at 15:53

## 2024-01-01 RX ADMIN — MUPIROCIN 1 APPLICATION: 20 OINTMENT TOPICAL at 20:44

## 2024-01-01 RX ADMIN — PIPERACILLIN SODIUM AND TAZOBACTAM SODIUM 3.38 G: 3; .375 INJECTION, SOLUTION INTRAVENOUS at 13:16

## 2024-01-01 RX ADMIN — POTASSIUM CHLORIDE 40 MEQ: 1.5 POWDER, FOR SOLUTION ORAL at 13:08

## 2024-01-01 RX ADMIN — PIPERACILLIN SODIUM AND TAZOBACTAM SODIUM 3.38 G: 3; .375 INJECTION, SOLUTION INTRAVENOUS at 18:32

## 2024-01-01 RX ADMIN — CARBIDOPA AND LEVODOPA 1 TABLET: 25; 100 TABLET ORAL at 21:17

## 2024-01-01 RX ADMIN — Medication: at 11:30

## 2024-01-01 RX ADMIN — GABAPENTIN 100 MG: 100 CAPSULE ORAL at 09:54

## 2024-01-01 RX ADMIN — GENTAMICIN SULFATE: 1 OINTMENT TOPICAL at 15:53

## 2024-01-01 RX ADMIN — POLYETHYLENE GLYCOL 3350 17 G: 17 POWDER, FOR SOLUTION ORAL at 08:52

## 2024-01-01 RX ADMIN — ROSUVASTATIN CALCIUM 40 MG: 10 TABLET, FILM COATED ORAL at 08:21

## 2024-01-01 RX ADMIN — GABAPENTIN 100 MG: 100 CAPSULE ORAL at 10:16

## 2024-01-01 RX ADMIN — ACETAMINOPHEN 650 MG: 325 TABLET ORAL at 11:58

## 2024-01-01 RX ADMIN — Medication 1 APPLICATION: at 20:17

## 2024-01-01 RX ADMIN — Medication: at 20:51

## 2024-01-01 RX ADMIN — MUPIROCIN 1 APPLICATION: 20 OINTMENT TOPICAL at 10:17

## 2024-01-01 RX ADMIN — PIPERACILLIN SODIUM AND TAZOBACTAM SODIUM 3.38 G: 3; .375 INJECTION, SOLUTION INTRAVENOUS at 15:27

## 2024-01-01 RX ADMIN — LEVOTHYROXINE SODIUM 88 MCG: 88 TABLET ORAL at 07:08

## 2024-01-01 RX ADMIN — Medication 1 APPLICATION: at 08:15

## 2024-01-01 RX ADMIN — HYDROCORTISONE: 1 CREAM TOPICAL at 22:36

## 2024-01-01 RX ADMIN — Medication: at 15:56

## 2024-01-01 RX ADMIN — METOPROLOL TARTRATE 25 MG: 25 TABLET, FILM COATED ORAL at 21:29

## 2024-01-01 RX ADMIN — CARBIDOPA AND LEVODOPA 1 TABLET: 25; 100 TABLET ORAL at 15:14

## 2024-01-01 RX ADMIN — GABAPENTIN 100 MG: 100 CAPSULE ORAL at 17:01

## 2024-01-01 RX ADMIN — CARBIDOPA AND LEVODOPA 0.5 TABLET: 25; 100 TABLET ORAL at 09:54

## 2024-01-01 RX ADMIN — MUPIROCIN: 20 OINTMENT TOPICAL at 21:19

## 2024-01-01 RX ADMIN — RIVAROXABAN 20 MG: 20 TABLET, FILM COATED ORAL at 16:10

## 2024-01-01 RX ADMIN — GABAPENTIN 100 MG: 100 CAPSULE ORAL at 15:52

## 2024-01-01 RX ADMIN — Medication 1 APPLICATION: at 20:44

## 2024-01-01 RX ADMIN — OXYCODONE HYDROCHLORIDE AND ACETAMINOPHEN 1 TABLET: 5; 325 TABLET ORAL at 12:18

## 2024-01-01 RX ADMIN — LEVOTHYROXINE SODIUM 88 MCG: 88 TABLET ORAL at 08:13

## 2024-01-01 RX ADMIN — GENTAMICIN SULFATE: 1 OINTMENT TOPICAL at 10:26

## 2024-01-01 RX ADMIN — TRAMADOL HYDROCHLORIDE 50 MG: 50 TABLET, COATED ORAL at 18:05

## 2024-01-01 RX ADMIN — LEVOTHYROXINE SODIUM 88 MCG: 88 TABLET ORAL at 05:38

## 2024-01-01 RX ADMIN — TRAMADOL HYDROCHLORIDE 50 MG: 50 TABLET, COATED ORAL at 20:50

## 2024-01-01 RX ADMIN — Medication: at 15:53

## 2024-01-01 RX ADMIN — POLYETHYLENE GLYCOL 3350 17 G: 17 POWDER, FOR SOLUTION ORAL at 09:54

## 2024-01-01 RX ADMIN — PIPERACILLIN SODIUM AND TAZOBACTAM SODIUM 3.38 G: 3; .375 INJECTION, SOLUTION INTRAVENOUS at 21:48

## 2024-01-01 RX ADMIN — PIPERACILLIN SODIUM AND TAZOBACTAM SODIUM 3.38 G: 3; .375 INJECTION, SOLUTION INTRAVENOUS at 06:49

## 2024-01-01 RX ADMIN — METOPROLOL TARTRATE 25 MG: 25 TABLET, FILM COATED ORAL at 08:13

## 2024-01-01 RX ADMIN — ROSUVASTATIN CALCIUM 40 MG: 10 TABLET, FILM COATED ORAL at 09:53

## 2024-01-01 RX ADMIN — GENTAMICIN SULFATE: 1 OINTMENT TOPICAL at 20:46

## 2024-01-01 RX ADMIN — CHOLECALCIFEROL TAB 25 MCG (1000 UNIT) 1000 UNITS: 25 TAB at 10:16

## 2024-01-01 RX ADMIN — CARBIDOPA AND LEVODOPA 0.5 TABLET: 25; 100 TABLET ORAL at 08:33

## 2024-01-01 RX ADMIN — LEVOTHYROXINE SODIUM 88 MCG: 88 TABLET ORAL at 06:24

## 2024-01-01 RX ADMIN — METOPROLOL TARTRATE 25 MG: 25 TABLET, FILM COATED ORAL at 20:03

## 2024-01-01 RX ADMIN — POLYETHYLENE GLYCOL 3350 17 G: 17 POWDER, FOR SOLUTION ORAL at 09:53

## 2024-01-01 RX ADMIN — MIRTAZAPINE 15 MG: 15 TABLET, FILM COATED ORAL at 20:43

## 2024-01-01 RX ADMIN — VANCOMYCIN HYDROCHLORIDE 1.25 G: 1.25 INJECTION, POWDER, LYOPHILIZED, FOR SOLUTION INTRAVENOUS at 12:26

## 2024-01-01 RX ADMIN — Medication: at 20:44

## 2024-01-01 RX ADMIN — PIPERACILLIN SODIUM AND TAZOBACTAM SODIUM 3.38 G: 3; .375 INJECTION, SOLUTION INTRAVENOUS at 17:57

## 2024-01-01 RX ADMIN — HYDROCORTISONE: 1 CREAM TOPICAL at 08:09

## 2024-01-01 RX ADMIN — METOPROLOL TARTRATE 25 MG: 25 TABLET, FILM COATED ORAL at 15:44

## 2024-01-01 RX ADMIN — PIPERACILLIN SODIUM AND TAZOBACTAM SODIUM 3.38 G: 3; .375 INJECTION, SOLUTION INTRAVENOUS at 18:44

## 2024-01-01 RX ADMIN — PIPERACILLIN SODIUM AND TAZOBACTAM SODIUM 3.38 G: 3; .375 INJECTION, SOLUTION INTRAVENOUS at 18:09

## 2024-01-01 RX ADMIN — MUPIROCIN 1 APPLICATION: 20 OINTMENT TOPICAL at 20:03

## 2024-01-01 RX ADMIN — INSULIN LISPRO 2 UNITS: 100 INJECTION, SOLUTION INTRAVENOUS; SUBCUTANEOUS at 16:49

## 2024-01-01 RX ADMIN — OXYCODONE HYDROCHLORIDE AND ACETAMINOPHEN 1 TABLET: 5; 325 TABLET ORAL at 15:18

## 2024-01-01 RX ADMIN — PIPERACILLIN SODIUM AND TAZOBACTAM SODIUM 3.38 G: 3; .375 INJECTION, SOLUTION INTRAVENOUS at 09:06

## 2024-01-01 RX ADMIN — PANTOPRAZOLE SODIUM 40 MG: 40 INJECTION, POWDER, FOR SOLUTION INTRAVENOUS at 06:09

## 2024-01-01 RX ADMIN — SODIUM HYPOCHLORITE: 5 SOLUTION TOPICAL at 08:53

## 2024-01-01 RX ADMIN — GENTAMICIN SULFATE: 1 OINTMENT TOPICAL at 08:16

## 2024-01-01 RX ADMIN — OXYCODONE HYDROCHLORIDE AND ACETAMINOPHEN 1 TABLET: 5; 325 TABLET ORAL at 15:53

## 2024-01-01 RX ADMIN — GABAPENTIN 100 MG: 100 CAPSULE ORAL at 21:55

## 2024-01-01 RX ADMIN — ROSUVASTATIN CALCIUM 40 MG: 10 TABLET, FILM COATED ORAL at 08:34

## 2024-01-01 RX ADMIN — GABAPENTIN 100 MG: 100 CAPSULE ORAL at 20:03

## 2024-01-01 RX ADMIN — METOPROLOL TARTRATE 25 MG: 25 TABLET, FILM COATED ORAL at 21:33

## 2024-01-01 RX ADMIN — GENTAMICIN SULFATE: 1 OINTMENT TOPICAL at 20:44

## 2024-01-01 SDOH — SOCIAL STABILITY: SOCIAL INSECURITY: ARE THERE ANY APPARENT SIGNS OF INJURIES/BEHAVIORS THAT COULD BE RELATED TO ABUSE/NEGLECT?: NO

## 2024-01-01 SDOH — SOCIAL STABILITY: SOCIAL INSECURITY: DOES ANYONE TRY TO KEEP YOU FROM HAVING/CONTACTING OTHER FRIENDS OR DOING THINGS OUTSIDE YOUR HOME?: NO

## 2024-01-01 SDOH — SOCIAL STABILITY: SOCIAL INSECURITY: WERE YOU ABLE TO COMPLETE ALL THE BEHAVIORAL HEALTH SCREENINGS?: YES

## 2024-01-01 SDOH — SOCIAL STABILITY: SOCIAL INSECURITY: ABUSE: ADULT

## 2024-01-01 SDOH — SOCIAL STABILITY: SOCIAL INSECURITY: ARE YOU OR HAVE YOU BEEN THREATENED OR ABUSED PHYSICALLY, EMOTIONALLY, OR SEXUALLY BY ANYONE?: NO

## 2024-01-01 SDOH — ECONOMIC STABILITY: FOOD INSECURITY: WITHIN THE PAST 12 MONTHS, THE FOOD YOU BOUGHT JUST DIDN'T LAST AND YOU DIDN'T HAVE MONEY TO GET MORE.: NEVER TRUE

## 2024-01-01 SDOH — SOCIAL STABILITY: SOCIAL INSECURITY: HAS ANYONE EVER THREATENED TO HURT YOUR FAMILY OR YOUR PETS?: NO

## 2024-01-01 SDOH — SOCIAL STABILITY: SOCIAL INSECURITY: DO YOU FEEL ANYONE HAS EXPLOITED OR TAKEN ADVANTAGE OF YOU FINANCIALLY OR OF YOUR PERSONAL PROPERTY?: NO

## 2024-01-01 SDOH — SOCIAL STABILITY: SOCIAL INSECURITY: DO YOU FEEL UNSAFE GOING BACK TO THE PLACE WHERE YOU ARE LIVING?: NO

## 2024-01-01 SDOH — ECONOMIC STABILITY: FOOD INSECURITY: WITHIN THE PAST 12 MONTHS, YOU WORRIED THAT YOUR FOOD WOULD RUN OUT BEFORE YOU GOT MONEY TO BUY MORE.: NEVER TRUE

## 2024-01-01 SDOH — HEALTH STABILITY: MENTAL HEALTH: CURRENT SMOKER: 0

## 2024-01-01 SDOH — SOCIAL STABILITY: SOCIAL INSECURITY: HAVE YOU HAD THOUGHTS OF HARMING ANYONE ELSE?: NO

## 2024-01-01 ASSESSMENT — COGNITIVE AND FUNCTIONAL STATUS - GENERAL
MOVING FROM LYING ON BACK TO SITTING ON SIDE OF FLAT BED WITH BEDRAILS: TOTAL
MOBILITY SCORE: 10
EATING MEALS: TOTAL
STANDING UP FROM CHAIR USING ARMS: TOTAL
DAILY ACTIVITIY SCORE: 6
MOBILITY SCORE: 6
CLIMB 3 TO 5 STEPS WITH RAILING: TOTAL
EATING MEALS: TOTAL
WALKING IN HOSPITAL ROOM: TOTAL
MOBILITY SCORE: 6
EATING MEALS: A LOT
DRESSING REGULAR LOWER BODY CLOTHING: TOTAL
DRESSING REGULAR LOWER BODY CLOTHING: TOTAL
EATING MEALS: TOTAL
WALKING IN HOSPITAL ROOM: TOTAL
PERSONAL GROOMING: A LOT
HELP NEEDED FOR BATHING: TOTAL
STANDING UP FROM CHAIR USING ARMS: A LOT
MOBILITY SCORE: 6
DRESSING REGULAR UPPER BODY CLOTHING: TOTAL
MOBILITY SCORE: 6
TURNING FROM BACK TO SIDE WHILE IN FLAT BAD: TOTAL
STANDING UP FROM CHAIR USING ARMS: TOTAL
DRESSING REGULAR UPPER BODY CLOTHING: TOTAL
MOVING TO AND FROM BED TO CHAIR: TOTAL
MOBILITY SCORE: 6
TOILETING: TOTAL
EATING MEALS: TOTAL
CLIMB 3 TO 5 STEPS WITH RAILING: TOTAL
STANDING UP FROM CHAIR USING ARMS: TOTAL
DRESSING REGULAR LOWER BODY CLOTHING: TOTAL
CLIMB 3 TO 5 STEPS WITH RAILING: TOTAL
MOBILITY SCORE: 8
EATING MEALS: TOTAL
DRESSING REGULAR UPPER BODY CLOTHING: TOTAL
TOILETING: TOTAL
HELP NEEDED FOR BATHING: TOTAL
DRESSING REGULAR UPPER BODY CLOTHING: TOTAL
WALKING IN HOSPITAL ROOM: TOTAL
HELP NEEDED FOR BATHING: TOTAL
PATIENT BASELINE BEDBOUND: YES
DRESSING REGULAR UPPER BODY CLOTHING: TOTAL
MOVING FROM LYING ON BACK TO SITTING ON SIDE OF FLAT BED WITH BEDRAILS: A LOT
DRESSING REGULAR LOWER BODY CLOTHING: TOTAL
DAILY ACTIVITIY SCORE: 6
TURNING FROM BACK TO SIDE WHILE IN FLAT BAD: TOTAL
DRESSING REGULAR UPPER BODY CLOTHING: TOTAL
PERSONAL GROOMING: A LOT
DAILY ACTIVITIY SCORE: 7
TURNING FROM BACK TO SIDE WHILE IN FLAT BAD: TOTAL
PERSONAL GROOMING: TOTAL
DRESSING REGULAR LOWER BODY CLOTHING: A LOT
DRESSING REGULAR UPPER BODY CLOTHING: TOTAL
MOVING FROM LYING ON BACK TO SITTING ON SIDE OF FLAT BED WITH BEDRAILS: A LOT
MOBILITY SCORE: 6
CLIMB 3 TO 5 STEPS WITH RAILING: TOTAL
PERSONAL GROOMING: TOTAL
DRESSING REGULAR LOWER BODY CLOTHING: A LOT
MOVING TO AND FROM BED TO CHAIR: TOTAL
EATING MEALS: A LOT
STANDING UP FROM CHAIR USING ARMS: A LOT
STANDING UP FROM CHAIR USING ARMS: TOTAL
DRESSING REGULAR LOWER BODY CLOTHING: A LOT
DRESSING REGULAR UPPER BODY CLOTHING: TOTAL
MOVING TO AND FROM BED TO CHAIR: TOTAL
DRESSING REGULAR UPPER BODY CLOTHING: TOTAL
HELP NEEDED FOR BATHING: TOTAL
CLIMB 3 TO 5 STEPS WITH RAILING: TOTAL
DRESSING REGULAR LOWER BODY CLOTHING: TOTAL
WALKING IN HOSPITAL ROOM: TOTAL
MOVING TO AND FROM BED TO CHAIR: TOTAL
WALKING IN HOSPITAL ROOM: TOTAL
PERSONAL GROOMING: A LITTLE
PERSONAL GROOMING: TOTAL
STANDING UP FROM CHAIR USING ARMS: TOTAL
TOILETING: TOTAL
CLIMB 3 TO 5 STEPS WITH RAILING: TOTAL
TOILETING: TOTAL
PERSONAL GROOMING: TOTAL
MOBILITY SCORE: 6
EATING MEALS: TOTAL
CLIMB 3 TO 5 STEPS WITH RAILING: A LOT
TURNING FROM BACK TO SIDE WHILE IN FLAT BAD: TOTAL
STANDING UP FROM CHAIR USING ARMS: A LOT
MOVING TO AND FROM BED TO CHAIR: TOTAL
TOILETING: A LOT
TURNING FROM BACK TO SIDE WHILE IN FLAT BAD: TOTAL
TURNING FROM BACK TO SIDE WHILE IN FLAT BAD: TOTAL
DAILY ACTIVITIY SCORE: 6
WALKING IN HOSPITAL ROOM: TOTAL
WALKING IN HOSPITAL ROOM: TOTAL
EATING MEALS: A LOT
CLIMB 3 TO 5 STEPS WITH RAILING: TOTAL
HELP NEEDED FOR BATHING: TOTAL
TURNING FROM BACK TO SIDE WHILE IN FLAT BAD: TOTAL
DRESSING REGULAR UPPER BODY CLOTHING: TOTAL
STANDING UP FROM CHAIR USING ARMS: TOTAL
MOVING TO AND FROM BED TO CHAIR: TOTAL
STANDING UP FROM CHAIR USING ARMS: A LOT
EATING MEALS: A LOT
MOBILITY SCORE: 6
DRESSING REGULAR LOWER BODY CLOTHING: TOTAL
DAILY ACTIVITIY SCORE: 6
TURNING FROM BACK TO SIDE WHILE IN FLAT BAD: A LOT
TOILETING: TOTAL
EATING MEALS: A LOT
MOBILITY SCORE: 6
WALKING IN HOSPITAL ROOM: A LOT
EATING MEALS: A LOT
PERSONAL GROOMING: TOTAL
MOVING FROM LYING ON BACK TO SITTING ON SIDE OF FLAT BED WITH BEDRAILS: TOTAL
TURNING FROM BACK TO SIDE WHILE IN FLAT BAD: A LOT
STANDING UP FROM CHAIR USING ARMS: TOTAL
MOBILITY SCORE: 9
MOVING FROM LYING ON BACK TO SITTING ON SIDE OF FLAT BED WITH BEDRAILS: A LOT
MOVING TO AND FROM BED TO CHAIR: TOTAL
TOILETING: TOTAL
HELP NEEDED FOR BATHING: TOTAL
TOILETING: TOTAL
MOVING FROM LYING ON BACK TO SITTING ON SIDE OF FLAT BED WITH BEDRAILS: TOTAL
STANDING UP FROM CHAIR USING ARMS: TOTAL
MOVING TO AND FROM BED TO CHAIR: TOTAL
STANDING UP FROM CHAIR USING ARMS: TOTAL
MOVING TO AND FROM BED TO CHAIR: A LOT
CLIMB 3 TO 5 STEPS WITH RAILING: TOTAL
TURNING FROM BACK TO SIDE WHILE IN FLAT BAD: A LOT
DRESSING REGULAR UPPER BODY CLOTHING: TOTAL
HELP NEEDED FOR BATHING: TOTAL
CLIMB 3 TO 5 STEPS WITH RAILING: TOTAL
MOVING TO AND FROM BED TO CHAIR: A LOT
CLIMB 3 TO 5 STEPS WITH RAILING: TOTAL
MOVING FROM LYING ON BACK TO SITTING ON SIDE OF FLAT BED WITH BEDRAILS: TOTAL
TOILETING: TOTAL
TOILETING: TOTAL
HELP NEEDED FOR BATHING: TOTAL
TOILETING: TOTAL
TURNING FROM BACK TO SIDE WHILE IN FLAT BAD: TOTAL
MOVING TO AND FROM BED TO CHAIR: TOTAL
DAILY ACTIVITIY SCORE: 8
DAILY ACTIVITIY SCORE: 6
WALKING IN HOSPITAL ROOM: TOTAL
TURNING FROM BACK TO SIDE WHILE IN FLAT BAD: A LOT
MOVING FROM LYING ON BACK TO SITTING ON SIDE OF FLAT BED WITH BEDRAILS: TOTAL
CLIMB 3 TO 5 STEPS WITH RAILING: TOTAL
MOBILITY SCORE: 12
DAILY ACTIVITIY SCORE: 6
PERSONAL GROOMING: TOTAL
DAILY ACTIVITIY SCORE: 8
DRESSING REGULAR LOWER BODY CLOTHING: A LOT
HELP NEEDED FOR BATHING: TOTAL
STANDING UP FROM CHAIR USING ARMS: TOTAL
TOILETING: TOTAL
MOVING TO AND FROM BED TO CHAIR: TOTAL
PERSONAL GROOMING: TOTAL
MOVING TO AND FROM BED TO CHAIR: TOTAL
CLIMB 3 TO 5 STEPS WITH RAILING: TOTAL
PERSONAL GROOMING: TOTAL
HELP NEEDED FOR BATHING: TOTAL
WALKING IN HOSPITAL ROOM: TOTAL
DRESSING REGULAR LOWER BODY CLOTHING: TOTAL
MOBILITY SCORE: 6
DRESSING REGULAR UPPER BODY CLOTHING: TOTAL
MOVING TO AND FROM BED TO CHAIR: A LOT
STANDING UP FROM CHAIR USING ARMS: TOTAL
PERSONAL GROOMING: A LOT
MOVING FROM LYING ON BACK TO SITTING ON SIDE OF FLAT BED WITH BEDRAILS: TOTAL
TOILETING: TOTAL
TURNING FROM BACK TO SIDE WHILE IN FLAT BAD: TOTAL
HELP NEEDED FOR BATHING: A LOT
MOVING TO AND FROM BED TO CHAIR: A LOT
DRESSING REGULAR LOWER BODY CLOTHING: TOTAL
WALKING IN HOSPITAL ROOM: TOTAL
TURNING FROM BACK TO SIDE WHILE IN FLAT BAD: TOTAL
MOVING FROM LYING ON BACK TO SITTING ON SIDE OF FLAT BED WITH BEDRAILS: TOTAL
MOVING TO AND FROM BED TO CHAIR: TOTAL
DRESSING REGULAR LOWER BODY CLOTHING: TOTAL
PERSONAL GROOMING: TOTAL
WALKING IN HOSPITAL ROOM: TOTAL
TURNING FROM BACK TO SIDE WHILE IN FLAT BAD: A LOT
HELP NEEDED FOR BATHING: TOTAL
CLIMB 3 TO 5 STEPS WITH RAILING: TOTAL
HELP NEEDED FOR BATHING: A LOT
DRESSING REGULAR UPPER BODY CLOTHING: TOTAL
CLIMB 3 TO 5 STEPS WITH RAILING: TOTAL
EATING MEALS: TOTAL
MOVING TO AND FROM BED TO CHAIR: TOTAL
PERSONAL GROOMING: TOTAL
MOVING TO AND FROM BED TO CHAIR: A LOT
EATING MEALS: TOTAL
WALKING IN HOSPITAL ROOM: TOTAL
EATING MEALS: TOTAL
CLIMB 3 TO 5 STEPS WITH RAILING: TOTAL
DRESSING REGULAR LOWER BODY CLOTHING: TOTAL
PERSONAL GROOMING: TOTAL
TOILETING: TOTAL
EATING MEALS: A LOT
DAILY ACTIVITIY SCORE: 6
HELP NEEDED FOR BATHING: TOTAL
DRESSING REGULAR UPPER BODY CLOTHING: TOTAL
MOBILITY SCORE: 10
DAILY ACTIVITIY SCORE: 9
DRESSING REGULAR UPPER BODY CLOTHING: A LOT
DRESSING REGULAR LOWER BODY CLOTHING: TOTAL
TOILETING: TOTAL
MOVING TO AND FROM BED TO CHAIR: TOTAL
TURNING FROM BACK TO SIDE WHILE IN FLAT BAD: TOTAL
EATING MEALS: TOTAL
TOILETING: TOTAL
DRESSING REGULAR LOWER BODY CLOTHING: TOTAL
DRESSING REGULAR UPPER BODY CLOTHING: TOTAL
PATIENT BASELINE BEDBOUND: YES
TURNING FROM BACK TO SIDE WHILE IN FLAT BAD: TOTAL
MOVING FROM LYING ON BACK TO SITTING ON SIDE OF FLAT BED WITH BEDRAILS: A LOT
MOBILITY SCORE: 6
DAILY ACTIVITIY SCORE: 6
DRESSING REGULAR LOWER BODY CLOTHING: TOTAL
CLIMB 3 TO 5 STEPS WITH RAILING: TOTAL
DRESSING REGULAR LOWER BODY CLOTHING: TOTAL
MOVING FROM LYING ON BACK TO SITTING ON SIDE OF FLAT BED WITH BEDRAILS: TOTAL
DAILY ACTIVITIY SCORE: 6
EATING MEALS: TOTAL
PERSONAL GROOMING: TOTAL
MOVING TO AND FROM BED TO CHAIR: TOTAL
WALKING IN HOSPITAL ROOM: TOTAL
MOBILITY SCORE: 6
TOILETING: TOTAL
DAILY ACTIVITIY SCORE: 10
MOVING FROM LYING ON BACK TO SITTING ON SIDE OF FLAT BED WITH BEDRAILS: A LOT
HELP NEEDED FOR BATHING: TOTAL
DAILY ACTIVITIY SCORE: 11
CLIMB 3 TO 5 STEPS WITH RAILING: TOTAL
DRESSING REGULAR LOWER BODY CLOTHING: TOTAL
MOVING FROM LYING ON BACK TO SITTING ON SIDE OF FLAT BED WITH BEDRAILS: A LOT
MOVING FROM LYING ON BACK TO SITTING ON SIDE OF FLAT BED WITH BEDRAILS: TOTAL
DRESSING REGULAR UPPER BODY CLOTHING: TOTAL
DRESSING REGULAR LOWER BODY CLOTHING: TOTAL
EATING MEALS: TOTAL
PERSONAL GROOMING: TOTAL
STANDING UP FROM CHAIR USING ARMS: TOTAL
HELP NEEDED FOR BATHING: TOTAL
HELP NEEDED FOR BATHING: TOTAL
WALKING IN HOSPITAL ROOM: TOTAL
DRESSING REGULAR UPPER BODY CLOTHING: TOTAL
PERSONAL GROOMING: TOTAL
TURNING FROM BACK TO SIDE WHILE IN FLAT BAD: TOTAL
MOVING FROM LYING ON BACK TO SITTING ON SIDE OF FLAT BED WITH BEDRAILS: TOTAL
WALKING IN HOSPITAL ROOM: TOTAL
MOVING TO AND FROM BED TO CHAIR: TOTAL
DRESSING REGULAR UPPER BODY CLOTHING: A LOT
WALKING IN HOSPITAL ROOM: TOTAL
TOILETING: TOTAL
MOVING FROM LYING ON BACK TO SITTING ON SIDE OF FLAT BED WITH BEDRAILS: TOTAL
TURNING FROM BACK TO SIDE WHILE IN FLAT BAD: A LOT
EATING MEALS: TOTAL
CLIMB 3 TO 5 STEPS WITH RAILING: TOTAL
DAILY ACTIVITIY SCORE: 12
HELP NEEDED FOR BATHING: A LOT
HELP NEEDED FOR BATHING: A LOT
WALKING IN HOSPITAL ROOM: TOTAL
TOILETING: TOTAL
TURNING FROM BACK TO SIDE WHILE IN FLAT BAD: TOTAL
MOBILITY SCORE: 10
WALKING IN HOSPITAL ROOM: TOTAL
WALKING IN HOSPITAL ROOM: TOTAL
STANDING UP FROM CHAIR USING ARMS: TOTAL
MOVING FROM LYING ON BACK TO SITTING ON SIDE OF FLAT BED WITH BEDRAILS: TOTAL
HELP NEEDED FOR BATHING: TOTAL
DRESSING REGULAR LOWER BODY CLOTHING: TOTAL
DAILY ACTIVITIY SCORE: 6
MOVING FROM LYING ON BACK TO SITTING ON SIDE OF FLAT BED WITH BEDRAILS: TOTAL
MOBILITY SCORE: 6
PERSONAL GROOMING: TOTAL
WALKING IN HOSPITAL ROOM: TOTAL
STANDING UP FROM CHAIR USING ARMS: TOTAL
MOBILITY SCORE: 6
CLIMB 3 TO 5 STEPS WITH RAILING: TOTAL
DAILY ACTIVITIY SCORE: 6
MOBILITY SCORE: 6
DAILY ACTIVITIY SCORE: 9
STANDING UP FROM CHAIR USING ARMS: TOTAL
PERSONAL GROOMING: A LOT
STANDING UP FROM CHAIR USING ARMS: TOTAL
DRESSING REGULAR UPPER BODY CLOTHING: TOTAL
PERSONAL GROOMING: TOTAL
TOILETING: TOTAL
HELP NEEDED FOR BATHING: TOTAL
MOVING FROM LYING ON BACK TO SITTING ON SIDE OF FLAT BED WITH BEDRAILS: TOTAL
TURNING FROM BACK TO SIDE WHILE IN FLAT BAD: TOTAL
EATING MEALS: A LITTLE
MOVING FROM LYING ON BACK TO SITTING ON SIDE OF FLAT BED WITH BEDRAILS: TOTAL
TOILETING: TOTAL
STANDING UP FROM CHAIR USING ARMS: TOTAL
DRESSING REGULAR UPPER BODY CLOTHING: TOTAL
CLIMB 3 TO 5 STEPS WITH RAILING: TOTAL

## 2024-01-01 ASSESSMENT — ACTIVITIES OF DAILY LIVING (ADL)
TOILETING: DEPENDENT
ADEQUATE_TO_COMPLETE_ADL: YES
PATIENT'S MEMORY ADEQUATE TO SAFELY COMPLETE DAILY ACTIVITIES?: YES
PATIENT'S MEMORY ADEQUATE TO SAFELY COMPLETE DAILY ACTIVITIES?: NO
BATHING: DEPENDENT
GROOMING: DEPENDENT
HEARING - LEFT EAR: FUNCTIONAL
LACK_OF_TRANSPORTATION: NO
ASSISTIVE_DEVICE: EYEGLASSES
DRESSING YOURSELF: DEPENDENT
GROOMING: DEPENDENT
HEARING - RIGHT EAR: FUNCTIONAL
HEARING - RIGHT EAR: FUNCTIONAL
ASSISTIVE_DEVICE: WHEELCHAIR
WALKS IN HOME: DEPENDENT
ADEQUATE_TO_COMPLETE_ADL: YES
BATHING: DEPENDENT
FEEDING YOURSELF: DEPENDENT
DRESSING YOURSELF: DEPENDENT
HEARING - LEFT EAR: FUNCTIONAL
WALKS IN HOME: DEPENDENT
TOILETING: DEPENDENT
LACK_OF_TRANSPORTATION: NO
JUDGMENT_ADEQUATE_SAFELY_COMPLETE_DAILY_ACTIVITIES: NO
FEEDING YOURSELF: DEPENDENT
JUDGMENT_ADEQUATE_SAFELY_COMPLETE_DAILY_ACTIVITIES: NO

## 2024-01-01 ASSESSMENT — LIFESTYLE VARIABLES
HOW OFTEN DO YOU HAVE 6 OR MORE DRINKS ON ONE OCCASION: NEVER
EVER FELT BAD OR GUILTY ABOUT YOUR DRINKING: NO
HOW OFTEN DO YOU HAVE A DRINK CONTAINING ALCOHOL: NEVER
HOW OFTEN DO YOU HAVE 6 OR MORE DRINKS ON ONE OCCASION: NEVER
SKIP TO QUESTIONS 9-10: 1
PRESCIPTION_ABUSE_PAST_12_MONTHS: NO
HOW MANY STANDARD DRINKS CONTAINING ALCOHOL DO YOU HAVE ON A TYPICAL DAY: PATIENT DOES NOT DRINK
SKIP TO QUESTIONS 9-10: 1
HAVE YOU EVER FELT YOU SHOULD CUT DOWN ON YOUR DRINKING: NO
AUDIT-C TOTAL SCORE: 0
HOW MANY STANDARD DRINKS CONTAINING ALCOHOL DO YOU HAVE ON A TYPICAL DAY: PATIENT DOES NOT DRINK
HOW OFTEN DO YOU HAVE A DRINK CONTAINING ALCOHOL: NEVER
HAVE PEOPLE ANNOYED YOU BY CRITICIZING YOUR DRINKING: NO
AUDIT-C TOTAL SCORE: 0
EVER HAD A DRINK FIRST THING IN THE MORNING TO STEADY YOUR NERVES TO GET RID OF A HANGOVER: NO
AUDIT-C TOTAL SCORE: 0
AUDIT-C TOTAL SCORE: 0
SUBSTANCE_ABUSE_PAST_12_MONTHS: NO

## 2024-01-01 ASSESSMENT — COLUMBIA-SUICIDE SEVERITY RATING SCALE - C-SSRS
6. HAVE YOU EVER DONE ANYTHING, STARTED TO DO ANYTHING, OR PREPARED TO DO ANYTHING TO END YOUR LIFE?: NO
1. IN THE PAST MONTH, HAVE YOU WISHED YOU WERE DEAD OR WISHED YOU COULD GO TO SLEEP AND NOT WAKE UP?: NO
6. HAVE YOU EVER DONE ANYTHING, STARTED TO DO ANYTHING, OR PREPARED TO DO ANYTHING TO END YOUR LIFE?: NO
1. IN THE PAST MONTH, HAVE YOU WISHED YOU WERE DEAD OR WISHED YOU COULD GO TO SLEEP AND NOT WAKE UP?: NO
6. HAVE YOU EVER DONE ANYTHING, STARTED TO DO ANYTHING, OR PREPARED TO DO ANYTHING TO END YOUR LIFE?: NO
2. HAVE YOU ACTUALLY HAD ANY THOUGHTS OF KILLING YOURSELF?: NO
1. IN THE PAST MONTH, HAVE YOU WISHED YOU WERE DEAD OR WISHED YOU COULD GO TO SLEEP AND NOT WAKE UP?: NO

## 2024-01-01 ASSESSMENT — ENCOUNTER SYMPTOMS
DEPRESSION: 0
OCCASIONAL FEELINGS OF UNSTEADINESS: 1
BACK PAIN: 1
SHORTNESS OF BREATH: 0
LOSS OF SENSATION IN FEET: 0

## 2024-01-01 ASSESSMENT — PAIN - FUNCTIONAL ASSESSMENT
PAIN_FUNCTIONAL_ASSESSMENT: 0-10
PAIN_FUNCTIONAL_ASSESSMENT: WONG-BAKER FACES
PAIN_FUNCTIONAL_ASSESSMENT: 0-10
PAIN_FUNCTIONAL_ASSESSMENT: 0-10
PAIN_FUNCTIONAL_ASSESSMENT: WONG-BAKER FACES
PAIN_FUNCTIONAL_ASSESSMENT: 0-10
PAIN_FUNCTIONAL_ASSESSMENT: PAINAD (PAIN ASSESSMENT IN ADVANCED DEMENTIA SCALE)
PAIN_FUNCTIONAL_ASSESSMENT: 0-10
PAIN_FUNCTIONAL_ASSESSMENT: FLACC (FACE, LEGS, ACTIVITY, CRY, CONSOLABILITY)
PAIN_FUNCTIONAL_ASSESSMENT: 0-10
PAIN_FUNCTIONAL_ASSESSMENT: CPOT (CRITICAL CARE PAIN OBSERVATION TOOL)
PAIN_FUNCTIONAL_ASSESSMENT: 0-10
PAIN_FUNCTIONAL_ASSESSMENT: 0-10
PAIN_FUNCTIONAL_ASSESSMENT: PAINAD (PAIN ASSESSMENT IN ADVANCED DEMENTIA SCALE)
PAIN_FUNCTIONAL_ASSESSMENT: 0-10
PAIN_FUNCTIONAL_ASSESSMENT: CPOT (CRITICAL CARE PAIN OBSERVATION TOOL)
PAIN_FUNCTIONAL_ASSESSMENT: 0-10

## 2024-01-01 ASSESSMENT — PAIN DESCRIPTION - ORIENTATION
ORIENTATION: LEFT;RIGHT
ORIENTATION: RIGHT
ORIENTATION: RIGHT;LEFT

## 2024-01-01 ASSESSMENT — PAIN SCALES - PAIN ASSESSMENT IN ADVANCED DEMENTIA (PAINAD)
BODYLANGUAGE: RELAXED
FACIALEXPRESSION: SMILING OR INEXPRESSIVE
CONSOLABILITY: NO NEED TO CONSOLE
CONSOLABILITY: NO NEED TO CONSOLE
BODYLANGUAGE: RELAXED
BREATHING: NORMAL
NEGVOCALIZATION: OCCASIONAL MOAN/GROAN, LOW SPEECH, NEGATIVE/DISAPPROVING QUALITY
FACIALEXPRESSION: SMILING OR INEXPRESSIVE
BODYLANGUAGE: RELAXED
FACIALEXPRESSION: SMILING OR INEXPRESSIVE
CONSOLABILITY: NO NEED TO CONSOLE
TOTALSCORE: 0
BODYLANGUAGE: RELAXED
BREATHING: NORMAL
CONSOLABILITY: DISTRACTED OR REASSURED BY VOICE/TOUCH
TOTALSCORE: 1
BODYLANGUAGE: RELAXED
NEGVOCALIZATION: OCCASIONAL MOAN/GROAN, LOW SPEECH, NEGATIVE/DISAPPROVING QUALITY
TOTALSCORE: 0
BREATHING: NORMAL
CONSOLABILITY: NO NEED TO CONSOLE
TOTALSCORE: 1
BREATHING: NORMAL
BREATHING: NORMAL
CONSOLABILITY: NO NEED TO CONSOLE
TOTALSCORE: 0

## 2024-01-01 ASSESSMENT — PAIN SCALES - GENERAL
PAINLEVEL_OUTOF10: 7
PAINLEVEL_OUTOF10: 0 - NO PAIN
PAINLEVEL_OUTOF10: 3
PAINLEVEL_OUTOF10: 7
PAIN_LEVEL: 0
PAINLEVEL_OUTOF10: 0 - NO PAIN
PAINLEVEL_OUTOF10: 0 - NO PAIN
PAINLEVEL: 0-NO PAIN
PAINLEVEL_OUTOF10: 7
PAINLEVEL_OUTOF10: 5 - MODERATE PAIN
PAINLEVEL: INELIGIBLE
PAINLEVEL_OUTOF10: 0 - NO PAIN
PAINLEVEL_OUTOF10: 3
PAINLEVEL_OUTOF10: 9
PAINLEVEL_OUTOF10: 0 - NO PAIN
PAINLEVEL_OUTOF10: 0 - NO PAIN
PAINLEVEL_OUTOF10: 10 - WORST POSSIBLE PAIN
PAINLEVEL_OUTOF10: 0 - NO PAIN
PAINLEVEL_OUTOF10: 9
PAINLEVEL_OUTOF10: 0 - NO PAIN
PAINLEVEL_OUTOF10: 0 - NO PAIN
PAINLEVEL_OUTOF10: 4
PAINLEVEL_OUTOF10: 0 - NO PAIN
PAINLEVEL_OUTOF10: 3
PAINLEVEL_OUTOF10: 6
PAINLEVEL_OUTOF10: 1
PAINLEVEL_OUTOF10: 0 - NO PAIN
PAINLEVEL_OUTOF10: 0 - NO PAIN
PAINLEVEL_OUTOF10: 4
PAINLEVEL_OUTOF10: 0 - NO PAIN
PAINLEVEL_OUTOF10: 5 - MODERATE PAIN
PAINLEVEL_OUTOF10: 4
PAINLEVEL_OUTOF10: 0 - NO PAIN
PAINLEVEL_OUTOF10: 2
PAINLEVEL_OUTOF10: 0 - NO PAIN
PAINLEVEL_OUTOF10: 4
PAINLEVEL_OUTOF10: 7
PAINLEVEL_OUTOF10: 7
PAINLEVEL_OUTOF10: 0 - NO PAIN
PAINLEVEL_OUTOF10: 2
PAINLEVEL_OUTOF10: 0 - NO PAIN
PAINLEVEL_OUTOF10: 10 - WORST POSSIBLE PAIN
PAINLEVEL_OUTOF10: 7
PAINLEVEL_OUTOF10: 2
PAINLEVEL_OUTOF10: 0 - NO PAIN
PAINLEVEL_OUTOF10: 7
PAINLEVEL_OUTOF10: 0 - NO PAIN
PAINLEVEL_OUTOF10: 4

## 2024-01-01 ASSESSMENT — PAIN SCALES - WONG BAKER
WONGBAKER_NUMERICALRESPONSE: HURTS LITTLE MORE
WONGBAKER_NUMERICALRESPONSE: NO HURT
WONGBAKER_NUMERICALRESPONSE: HURTS EVEN MORE
WONGBAKER_NUMERICALRESPONSE: HURTS LITTLE BIT
WONGBAKER_NUMERICALRESPONSE: HURTS LITTLE BIT
WONGBAKER_NUMERICALRESPONSE: HURTS WORST
WONGBAKER_NUMERICALRESPONSE: HURTS LITTLE MORE
WONGBAKER_NUMERICALRESPONSE: NO HURT
WONGBAKER_NUMERICALRESPONSE: HURTS LITTLE MORE
WONGBAKER_NUMERICALRESPONSE: HURTS EVEN MORE
WONGBAKER_NUMERICALRESPONSE: HURTS EVEN MORE

## 2024-01-01 ASSESSMENT — PAIN DESCRIPTION - DESCRIPTORS
DESCRIPTORS: ACHING
DESCRIPTORS: SHOOTING
DESCRIPTORS: ACHING

## 2024-01-01 ASSESSMENT — PATIENT HEALTH QUESTIONNAIRE - PHQ9
SUM OF ALL RESPONSES TO PHQ9 QUESTIONS 1 & 2: 0
1. LITTLE INTEREST OR PLEASURE IN DOING THINGS: NOT AT ALL
1. LITTLE INTEREST OR PLEASURE IN DOING THINGS: NOT AT ALL
2. FEELING DOWN, DEPRESSED OR HOPELESS: NOT AT ALL
SUM OF ALL RESPONSES TO PHQ9 QUESTIONS 1 & 2: 0
2. FEELING DOWN, DEPRESSED OR HOPELESS: NOT AT ALL

## 2024-01-01 ASSESSMENT — PAIN DESCRIPTION - LOCATION
LOCATION: BACK
LOCATION: SACRUM
LOCATION: BACK
LOCATION: LEG

## 2024-01-03 NOTE — PROGRESS NOTES
"Irina Stratton is a 73 y.o. female     Subjective   This patient presents today for follow-up of her abdominal aortic aneurysm and her carotid artery disease.  She currently denies any constitutional symptoms associated with both.  She is currently in a wheelchair.  She has not walked for quite a while because of back issues.  She currently denies any fever chills nausea vomiting or headache.  She has lost quite a bit of weight.         Objective     Vitals:    01/03/24 0700   BP: 110/64   Pulse: 72   SpO2: 97%      Physical Exam  This patient is alert and oriented.  She is in no acute distress.  Head is normocephalic.  Pupils are equal and reactive to light and accommodation.  Neck is soft and supple without palpable lymph nodes.  Heart is regular rate.  Lungs have some decreased breath sounds posteriorly.  Abdomen is soft with positive bowel sounds there is no pain on palpation.  Upper extremities have some decreased range of motion.  Her lower extremities are contracted.  She does have palpable brachial radial and femoral pulses.  Skin turgor is adequate.  Psychologically she appears to be acting appropriately for her situation  Blood pressure 110/64, pulse 72, height 1.651 m (5' 5\"), weight 47.6 kg (105 lb), SpO2 97 %.            Patient Active Problem List    Diagnosis Date Noted    Benign secondary hypertension due to renal artery stenosis (CMS/Formerly [facility]) 12/07/2023    AAA (abdominal aortic aneurysm) (CMS/Formerly [facility]) 09/23/2023    Bacteremia 09/23/2023    Bilateral carotid artery stenosis 09/23/2023    Carotid bruit 09/23/2023    Coronary atherosclerosis 09/23/2023    Degenerative spondylolisthesis 09/23/2023    DM (diabetes mellitus), type 1 (CMS/Formerly [facility]) 09/23/2023    Facet degeneration of lumbar region 09/23/2023    H/O heart artery stent 09/23/2023    Hypertension 09/23/2023    Hypothyroidism 09/23/2023    Lower extremity edema 09/23/2023    Lumbar herniated disc 09/23/2023    Lumbar radiculopathy 09/23/2023    Lumbar " stenosis with neurogenic claudication 09/23/2023    PAD (peripheral artery disease) (CMS/HCC) 09/23/2023    Carotid stenosis 09/23/2023    Saccular aneurysm 09/23/2023    Sacroiliitis (CMS/HCC) 09/23/2023    Ulcer of right foot (CMS/HCC) 09/23/2023    Abnormality of albumin 07/05/2023    Acidosis, unspecified 07/05/2023    Depression, unspecified 07/05/2023    Elevated white blood cell count, unspecified 07/05/2023    Obstructive and reflux uropathy, unspecified 07/05/2023    Pressure ulcer of sacral region, stage 4 (CMS/HCC) 07/05/2023    Unspecified protein-calorie malnutrition (CMS/HCC) 07/05/2023    Paroxysmal atrial fibrillation (CMS/HCC) 12/12/2022    Chronic kidney disease, stage 4 (severe) (CMS/HCC) 12/12/2022    Anemia, unspecified 12/12/2022    Cyst of kidney, acquired 12/12/2022    Emphysema, unspecified (CMS/HCC) 12/12/2022    Hypokalemia 12/12/2022    Insomnia, unspecified 12/12/2022    Left bundle-branch block, unspecified 12/12/2022    Low back pain, unspecified 12/12/2022    Other cholelithiasis without obstruction 12/12/2022    Old myocardial infarction 12/12/2022    Hyperlipidemia 10/18/2018    Tobacco use 10/18/2018    Occlusion and stenosis of unspecified carotid artery 10/18/2018    Unspecified osteoarthritis, unspecified site 10/18/2018    Renal artery stenosis (CMS/HCC) 01/24/2014          Current Outpatient Medications:     acetaminophen (Tylenol) 325 mg tablet, Take 2 tablets (650 mg) by mouth every 4 hours if needed for fever (temp greater than 38.0 C) (Pain)., Disp: , Rfl:     blood sugar diagnostic (Accu-Chek Guide test strips) strip, Accu-Chek Guide In Vitro Strip  Quantity: 100  Refills: 0      Start : 21-Jul-2020  Active, Disp: , Rfl:     calcitriol (Rocaltrol) 0.25 mcg capsule, Take 1 capsule (0.25 mcg) by mouth. Monday, Wednesday, and Friday, Disp: , Rfl:     carbidopa-levodopa (Sinemet)  mg tablet, Take 1/2 tab TID with small meals x 7 days then take 1 tab TID and continue,  Disp: 270 tablet, Rfl: 3    cholecalciferol (Vitamin D-3) 25 MCG (1000 UT) capsule, Take 1 capsule (25 mcg) by mouth once daily., Disp: , Rfl:     DULoxetine (Cymbalta) 30 mg DR capsule, Take 1 capsule (30 mg) by mouth once daily., Disp: , Rfl:     gabapentin (Neurontin) 100 mg capsule, Take 1 capsule (100 mg) by mouth 3 times a day., Disp: 90 capsule, Rfl: 3    glimepiride (Amaryl) 1 mg tablet, Take 1 tablet (1 mg) by mouth once daily in the morning. Take before meals., Disp: , Rfl:     lancets misc, Accu-chek Fastclix Lancets, Disp: , Rfl:     levothyroxine (Synthroid, Levoxyl) 88 mcg tablet, Take 1 tablet (88 mcg) by mouth once daily in the morning. Take before meals., Disp: , Rfl:     metoprolol tartrate (Lopressor) 25 mg tablet, Take 1 tablet (25 mg) by mouth 2 times a day., Disp: , Rfl:     naloxone (Narcan) 4 mg/0.1 mL nasal spray, Administer 1 spray (4 mg) into affected nostril(s) if needed., Disp: , Rfl:     oxyCODONE-acetaminophen (Percocet) 5-325 mg tablet, Take 1 tablet by mouth every 8 hours if needed for severe pain (7 - 10) or moderate pain (4 - 6)., Disp: 90 tablet, Rfl: 0    rivaroxaban (Xarelto) 20 mg tablet, Take 1 tablet (20 mg) by mouth once daily., Disp: , Rfl:     rosuvastatin (Crestor) 40 mg tablet, Take 1 tablet (40 mg) by mouth once daily., Disp: 30 tablet, Rfl: 2    tamsulosin (Flomax) 0.4 mg 24 hr capsule, Take 1 capsule (0.4 mg) by mouth once daily., Disp: , Rfl:      Lab Results   Component Value Date    WBC 9.2 07/24/2023    HGB 11.2 (L) 07/24/2023    HCT 39.1 07/24/2023     07/24/2023    CHOL 178 07/19/2021    TRIG 245 (H) 07/19/2021    HDL 36.6 (A) 07/19/2021    ALT 13 07/24/2023    AST 22 07/24/2023     07/24/2023    K 4.3 07/24/2023     07/24/2023    CREATININE 0.76 07/24/2023    BUN 13 07/24/2023    CO2 21 07/24/2023    TSH 1.89 07/24/2023    INR 1.2 (H) 12/05/2022    HGBA1C 7.4 (A) 12/14/2022       Vascular US renal artery duplex complete    Result Date:  12/7/2023           [facility], Vascular Lab [address], [place] [address]             Tel [phone] and Fax [phone]  Vascular Lab Report [facility] US RENAL ARTERY DUPLEX COMPLETE  Patient Name:      ANTHONY RODRIGUES     Reading Physician:  [id number] Ritu Anthony MD Study Date:        12/6/2023            Ordering Physician: [id number] NAFISA ALVAREZ MRN/PID:           [id number]             Technologist:       Silverio Mo RVT, RDMS Accession#:        [id number]         Technologist 2: Date of Birth/Age: 1950 / 73 years Encounter#:         [id number] Gender:            F Admission Status:  Outpatient           Location Performed: [facility]  Diagnosis/ICD: Atherosclerosis of renal artery-I70.1 CPT Codes:     34079 Abdominal Visceral Renal  Patient History HTN. S/P Right Renal artery stent.  CONCLUSIONS: Right Renal Artery: Right renal arteries demonstrate no evidence of hemodynamically significant stenosis. The right renal veins are widely patent. Multiple renal cysts documented in the right kidney. Renal stent is notde to be widely patent. Left Renal Artery: The left kidney appears atrophic. Multiple renal cysts documented in the left kidney. Left renal artery not visualized.  Comparison: Compared with study from 5/3/2022,.  Imaging & Doppler Findings:  Renal Artery Duplex Right Kidney: 11.9 cm                           Left Kidney: 6.1 cm       Systolic        Diastolic     ARTERY           Systolic       Diastolic        50 cm/s         16 cm/s      Origin        81 cm/s         18 cm/s       Prox        75 cm/s         12 cm/s        Mid        67 cm/s         18 cm/s      Distal        15 cm/s         4 cm/s      Superior        14 cm/s         5 cm/s      Inferior                         Right                          Left                          0.7    Resistive Index             Right Renal Cyst 1.7 cm   Den Anthony MD Electronically signed by Den Anthony MD on 12/7/2023 at 3:10:32 PM  ** Final **     Vascular US aorta iliac duplex complete    Result Date: 12/7/2023           [facility], Vascular Lab [address], [place] [address]             Tel [phone] and Fax [phone]  Vascular Lab Report VASC US AORTA ILIAC DUPLEX COMPLETE  Patient Name:      ANTHONY RODRIGUES     Reading Physician:  Den Anthony MD Study Date:        12/6/2023            Ordering Physician: [id number] CATRACHITO GRIFFIN MRN/PID:           [id number]             Technologist:       Silverio Mo RVT [facility] Accession#:        [id number]         Technologist 2: Date of Birth/Age: 1950 / 73 years Encounter#:         [id number] Gender:            F Admission Status:  Outpatient           Location Performed: [facility]  Diagnosis/ICD: Abdominal aortic aneurysm, without rupture, unspecified-I71.40 CPT Codes:     11383 Duplex Aorta/IVC/Iliac/Bypass Graft  CONCLUSIONS: Aorta/Common Iliac Arteries/IVC: Abdominal aortic stent graft is patent with no evidence of endoleak.  Comparison: Compared with study from 5/24/2022, no significant change.  Imaging & Doppler Findings:   AORTA     AP    Lateral    PSV Proximal 1.90 cm 1.90 cm 32.0 cm/s   Mid    2.30 cm 2.30 cm 37.0 cm/s  Distal  4.40 cm 4.50 cm    RIGHT       AP    Lateral    PSV INA Proximal 1.00 cm 1.00 cm 77.00 cm/s     LEFT       AP    Lateral    PSV INA Proximal 0.80 cm 0.80 cm 72.00 cm/s                   Right   Left  Prox Endograft  17 cm/s 25 cm/s  Mid Endograft   17 cm/s 15 cm/s Distal Endograft 31 cm/s 42 cm/s   Dne Anthony MD Electronically signed by Den Anthony MD on 12/7/2023 at 3:02:58 PM  ** Final **     Vascular US carotid artery duplex  bilateral    Result Date: 12/7/2023           [facility], Vascular Lab [address], [place] [address]             Tel [phone] and Fax [phone]  Vascular Lab Report [facility] US CAROTID ARTERY DUPLEX BILATERAL  Patient Name:      ANTHONY RODRIGUES     Reading Physician:  [id number] Ritu Anthony MD Study Date:        12/6/2023            Ordering Physician: [id number] CATRACHITO GRIFFIN MRN/PID:           [id number]             Technologist:       Silverio Mo RVT, [facility] Accession#:        [id number]         Technologist 2: Date of Birth/Age: 1950 / 73 years Encounter#:         [id number] Gender:            F Admission Status:  Outpatient           Location Performed: [facility]  Diagnosis/ICD: Other specified symptoms and signs involving the circulatory and                respiratory systems-R09.89 CPT Codes:     75566 Cerebrovascular Carotid Duplex scan complete  CONCLUSIONS: Right Carotid: Findings are consistent with less than 50% stenosis of the right proximal internal carotid artery. Laminar flow seen by color Doppler. Right external carotid artery appears patent with no evidence of stenosis. The right vertebral artery is patent with antegrade flow. No evidence of hemodynamically significant stenosis in the right subclavian artery. Left Carotid: Findings are consistent with less than 50% stenosis of the left proximal internal carotid artery. Left external carotid artery appears patent with no evidence of stenosis. The left vertebral artery is patent with antegrade flow. No evidence of hemodynamically significant stenosis in the left subclavian artery.  Comparison: Compared with study from 5/3/2022, no significant change.  Imaging & Doppler Findings: Right Plaque Morph: The proximal right internal carotid artery demonstrates  heterogenous plaque. The distal right common carotid artery demonstrates heterogenous plaque. The right carotid bulb demonstrates heterogenous plaque. Left Plaque Morph: The proximal left internal carotid artery demonstrates heterogenous plaque. The distal left common carotid artery demonstrates heterogenous plaque. The left carotid bulb demonstrates heterogenous plaque.   Right                        Left   PSV      EDV                 PSV       cm/s           CCA P    1735 cm/s 69 cm/s            CCA D     30 cm/s 90 cm/s  29 cm/s   ICA P     98 cm/s  33 cm/s 57 cm/s  19 cm/s   ICA M     45 cm/s  14 cm/s 55 cm/s  17 cm/s   ICA D     41 cm/s  15 cm/s 116 cm/s            ECA      17 cm/s 36 cm/s  9 cm/s  Vertebral   22 cm/s  9 cm/s 239 cm/s         Subclavian 107 cm/s               Right Left ICA/CCA Ratio  1.3  3.3   [id number] Ritu Anthony MD Electronically signed by [id number] Ritu Anthony MD on 12/7/2023 at 2:58:20 PM  ** Final **                 Assessment/Plan   Active Problems:  There are no active Hospital Problems.      This patient presents today for follow-up of her abdominal aortic aneurysm and her carotid artery disease.  She has not had any carotid surgery done in the past.  I did do a bifurcated aortic stent graft for her aneurysm.  Her aortic surgery was done in January 2020.  She did recently quit smoking.  Her overall health has significantly deteriorated.  Her recent aortic duplex scan shows excellent results with the maximum sac diameter of 4.5 cm and no endoleak.  Her carotid duplex scan that she had done shows mild disease bilaterally.  Because of her current debilitated health, this patient can follow-up as needed      Tani Gutierrez,

## 2024-01-11 NOTE — PROGRESS NOTES
It was a pleasure to see Ms. Stratton at the Neurosurgery Spine Clinic at [facility].     Patient is a 73-year-old female who is here with her  and her daughter for complaint of back pain.  A lot of the patient's history is provided by her family.  Patient has been having significant cognitive decline over the course of the last year.  She is also now wheelchair-bound since March 2023.  Patient states that she has some back pain.  He is unable to answer detailed questions about her history or her total symptoms.  Malcolm Kaitlin states the patient was using a walker prior to being in wheelchair since last March.  She has seen a neurologist and might have underlying neuromuscular disorder.  She is also on chronic narcotics for pain.    The pain is 10 /10. The pain is described as aching and occurs intermittently.  Symptoms are exacerbated by standing. Factors which relieve the pain include rest and sitting       Numbness and/or tingling - NO    Weakness : YES    Bladder/Bowel symptoms - NO    The patient has tried medications (eg: gabapentin, NSAIDS and narcotics ) : Yes  PT : Yes    Date: last was June  Pain Management with ESIs/selective nerve blocks  - YES    she is a NON-SMOKER and NON-DIABETIC    History of Depression : NO    PRIOR SPINE SURGERY: NO    Denies use of Aspirin, Coumadin, or Plavix or any other anticoagulants     NARCOTICS for pain : YES    Part of this patient’s history is from personal review of the patient’s previous charts.      Past Medical History:   Diagnosis Date    Abnormal radiologic findings on diagnostic imaging of right kidney     Abnormal ultrasound of both kidneys    Personal history of other medical treatment     History of echocardiogram           Current Outpatient Medications:     acetaminophen (Tylenol) 325 mg tablet, Take 2 tablets (650 mg) by mouth every 4 hours if needed for fever (temp greater than 38.0 C) (Pain)., Disp: , Rfl:     blood sugar diagnostic  (Accu-Chek Guide test strips) strip, Accu-Chek Guide In Vitro Strip  Quantity: 100  Refills: 0      Start : 21-Jul-2020  Active, Disp: , Rfl:     calcitriol (Rocaltrol) 0.25 mcg capsule, Take 1 capsule (0.25 mcg) by mouth. Monday, Wednesday, and Friday, Disp: , Rfl:     carbidopa-levodopa (Sinemet)  mg tablet, Take 1/2 tab TID with small meals x 7 days then take 1 tab TID and continue, Disp: 270 tablet, Rfl: 3    cholecalciferol (Vitamin D-3) 25 MCG (1000 UT) capsule, Take 1 capsule (25 mcg) by mouth once daily., Disp: , Rfl:     DULoxetine (Cymbalta) 30 mg DR capsule, Take 1 capsule (30 mg) by mouth once daily., Disp: , Rfl:     gabapentin (Neurontin) 100 mg capsule, Take 1 capsule (100 mg) by mouth 3 times a day., Disp: 90 capsule, Rfl: 3    glimepiride (Amaryl) 1 mg tablet, Take 1 tablet (1 mg) by mouth once daily in the morning. Take before meals., Disp: , Rfl:     lancets misc, Accu-chek Fastclix Lancets, Disp: , Rfl:     levothyroxine (Synthroid, Levoxyl) 88 mcg tablet, Take 1 tablet (88 mcg) by mouth once daily in the morning. Take before meals., Disp: , Rfl:     metoprolol tartrate (Lopressor) 25 mg tablet, Take 1 tablet (25 mg) by mouth 2 times a day., Disp: , Rfl:     naloxone (Narcan) 4 mg/0.1 mL nasal spray, Administer 1 spray (4 mg) into affected nostril(s) if needed., Disp: , Rfl:     oxyCODONE-acetaminophen (Percocet) 5-325 mg tablet, Take 1 tablet by mouth every 8 hours if needed for severe pain (7 - 10) or moderate pain (4 - 6). Do not start before January 5, 2024., Disp: 90 tablet, Rfl: 0    rivaroxaban (Xarelto) 20 mg tablet, Take 1 tablet (20 mg) by mouth once daily., Disp: , Rfl:     rosuvastatin (Crestor) 40 mg tablet, Take 1 tablet (40 mg) by mouth once daily., Disp: 30 tablet, Rfl: 2    tamsulosin (Flomax) 0.4 mg 24 hr capsule, Take 1 capsule (0.4 mg) by mouth once daily., Disp: , Rfl:       Review of Systems :         EXAM:   There were no vitals filed for this visit.    General: Very  frail    Skin: Warm and dry, no lesions, no rashes    ENMT: Mucous membranes moist, no apparent injury, no lesions seen    Head/Neck: Neck Supple, no apparent injury    Respiratory/Thorax: Normal breath sounds with good chest expansion, thorax symmetric    Cardiovascular: No pitting edema, no JVD    Patient is in a wheelchair.  She is unable to stand up.  Neurological exam is severely limited she is oriented to herself.  She is able to move bilateral upper extremity antigravity.  Bilateral lower extremity she is able to wiggle her toes.  Per family she has significant contractures in the lower extremity that been present for a number of months.    IMAGING:   Imaging was personally reviewed shows MRI of the lumbar spine from 2021 with multilevel arthritic with foraminal stenosis.    ASSESSMENT AND PLAN:  Patient is a 73-year-old female who presents with us with complaint of back pain.  The patient has had significant cognitive decline and overall neuromuscular issues over the course of the last year.  Noted this issues could be explained with patient's issues.  At this point patient does not need any neurosurgical intervention.  Patient needs to see her neurologist for any neuromuscular issues.  All questions were answered.        Renzo Quintanilla MD, [facility]   of Neurological Surgery  [facility] [employer]  Attending Surgeon  Director - Minimally Invasive Spine Surgery  [place], [place]      Some of this note was completed using Dragon voice recognition technology and sometimes the software misinterprets words. This may include unintended errors with respect to translation of words, typographical errors or grammar errors which may not have been identified prior to finalization of the chart note. Please take this into account when reading this note

## 2024-01-12 NOTE — PATIENT INSTRUCTIONS
"It was a pleasure seeing you today.     Take Sinemet three times daily when waking up and every 5 hours while awake.     Please keep the February appointment.    For any urgent issues or needing to speak to a medical assistant please call [phone], option 6 during our office hours Monday-Friday 8am-4pm, and leave a voicemail with your concern.  My office will try to reach back you as soon as possible within 24 (business) hours.  If you have an emergency please call 911 or visit a local urgent care or nearest emergency room.      Please understand that wooju is a useful communication tool for simple \"normal\" results or a refill request but I would not recommend using this tool for emergent or urgent issues or for conversations with me.  I am happy to ask my staff to rearrange a follow up visit or a virtual visit sooner than requested if appropriate for your care.     "

## 2024-01-12 NOTE — PROGRESS NOTES
Subjective     Irina Stratton is a 73 y.o. year old female here for med changes/ atypical parkinsonism virtual visit-     [facility]  She is on sinemet for the past 3-4 weeks - 1/2 tab TID x 1 week and now takes 1 tab TID  ( every 8 hours)- tremor is better and  states has not seen her tremor.  She has no hallucinations, no worsening confusion.  Still a bit slow to respond to answer a question.   Back pain is her main issue.  Saw Dr. Quintanilla yesterday who advised to follow up with pain management.  Not eating well.   Both her starring and the twitching are better now.  She takes gabapentin 100mg TID, percocet prescribed by Dr. Liu.   Review of Systems    Patient Active Problem List   Diagnosis    Paroxysmal atrial fibrillation (CMS/HCC)    AAA (abdominal aortic aneurysm) (CMS/HCC)    Bacteremia    Bilateral carotid artery stenosis    Carotid bruit    Chronic kidney disease, stage 4 (severe) (CMS/HCC)    Coronary atherosclerosis    Degenerative spondylolisthesis    DM (diabetes mellitus), type 1 (CMS/HCC)    Facet degeneration of lumbar region    H/O heart artery stent    Hypertension    Hyperlipidemia    Hypothyroidism    Lower extremity edema    Lumbar herniated disc    Lumbar radiculopathy    Lumbar stenosis with neurogenic claudication    PAD (peripheral artery disease) (CMS/HCC)    Carotid stenosis    Renal artery stenosis (CMS/HCC)    Saccular aneurysm    Sacroiliitis (CMS/HCC)    Ulcer of right foot (CMS/HCC)    Abnormality of albumin    Acidosis, unspecified    Anemia, unspecified    Benign secondary hypertension due to renal artery stenosis (CMS/HCC)    Cyst of kidney, acquired    Depression, unspecified    Elevated white blood cell count, unspecified    Emphysema, unspecified (CMS/HCC)    Hypokalemia    Insomnia, unspecified    Left bundle-branch block, unspecified    Low back pain, unspecified    Tobacco use    Obstructive and reflux uropathy, unspecified    Occlusion and stenosis of unspecified  carotid artery    Other cholelithiasis without obstruction    Pressure ulcer of sacral region, stage 4 (CMS/HCC)    Unspecified osteoarthritis, unspecified site    Unspecified protein-calorie malnutrition (CMS/HCC)    Old myocardial infarction     Past Medical History:   Diagnosis Date    Abnormal radiologic findings on diagnostic imaging of right kidney     Abnormal ultrasound of both kidneys    Personal history of other medical treatment     History of echocardiogram     Past Surgical History:   Procedure Laterality Date     SECTION, CLASSIC  2016     Section    CT ABDOMEN PELVIS ANGIOGRAM W AND/OR WO IV CONTRAST  2019    CT ABDOMEN PELVIS ANGIOGRAM W AND/OR WO IV CONTRAST 2019 [facility]    CT AORTA AND BILATERAL ILIOFEMORAL RUNOFF ANGIOGRAM W AND/OR WO IV CONTRAST  2020    CT AORTA AND BILATERAL ILIOFEMORAL RUNOFF ANGIOGRAM W AND/OR WO IV CONTRAST 2020 [facility]GAC[facility]    OTHER SURGICAL HISTORY  2020    History of prior surgery    OTHER SURGICAL HISTORY  2020    Coronary artery stent placement    OTHER SURGICAL HISTORY  2020    Abdominal aortic aneurysm repair endovascular    OTHER SURGICAL HISTORY  2020    Cardiac catheterization    OTHER SURGICAL HISTORY  2019    Renal angioplasty and stenting     Social History     Tobacco Use    Smoking status: Former     Types: Cigarettes     Quit date:      Years since quittin.0    Smokeless tobacco: Never   Substance Use Topics    Alcohol use: Not Currently     family history includes Stroke in her mother.    Current Outpatient Medications:     acetaminophen (Tylenol) 325 mg tablet, Take 2 tablets (650 mg) by mouth every 4 hours if needed for fever (temp greater than 38.0 C) (Pain)., Disp: , Rfl:     blood sugar diagnostic (Accu-Chek Guide test strips) strip, Accu-Chek Guide In Vitro Strip  Quantity: 100  Refills: 0      Start : 2020  Active, Disp: , Rfl:     calcitriol  (Rocaltrol) 0.25 mcg capsule, Take 1 capsule (0.25 mcg) by mouth. Monday, Wednesday, and Friday, Disp: , Rfl:     carbidopa-levodopa (Sinemet)  mg tablet, Take 1/2 tab TID with small meals x 7 days then take 1 tab TID and continue, Disp: 270 tablet, Rfl: 3    cholecalciferol (Vitamin D-3) 25 MCG (1000 UT) capsule, Take 1 capsule (25 mcg) by mouth once daily., Disp: , Rfl:     DULoxetine (Cymbalta) 30 mg DR capsule, Take 1 capsule (30 mg) by mouth once daily., Disp: , Rfl:     gabapentin (Neurontin) 100 mg capsule, Take 1 capsule (100 mg) by mouth 3 times a day., Disp: 90 capsule, Rfl: 3    glimepiride (Amaryl) 1 mg tablet, Take 1 tablet (1 mg) by mouth once daily in the morning. Take before meals., Disp: , Rfl:     lancets misc, Accu-chek Fastclix Lancets, Disp: , Rfl:     levothyroxine (Synthroid, Levoxyl) 88 mcg tablet, Take 1 tablet (88 mcg) by mouth once daily in the morning. Take before meals., Disp: , Rfl:     metoprolol tartrate (Lopressor) 25 mg tablet, Take 1 tablet (25 mg) by mouth 2 times a day., Disp: , Rfl:     naloxone (Narcan) 4 mg/0.1 mL nasal spray, Administer 1 spray (4 mg) into affected nostril(s) if needed., Disp: , Rfl:     oxyCODONE-acetaminophen (Percocet) 5-325 mg tablet, Take 1 tablet by mouth every 8 hours if needed for severe pain (7 - 10) or moderate pain (4 - 6). Do not start before January 5, 2024., Disp: 90 tablet, Rfl: 0    rivaroxaban (Xarelto) 20 mg tablet, Take 1 tablet (20 mg) by mouth once daily., Disp: , Rfl:     rosuvastatin (Crestor) 40 mg tablet, Take 1 tablet (40 mg) by mouth once daily., Disp: 30 tablet, Rfl: 2    tamsulosin (Flomax) 0.4 mg 24 hr capsule, Take 1 capsule (0.4 mg) by mouth once daily., Disp: , Rfl:   No Known Allergies  There were no vitals taken for this visit.  alert, oriented x 3. face symmetric. speech is slow, soft.   Laying in bed.   Slow to respond. Contractures of legs. No tremor seen .           Labs:  CBC:   Lab Results   Component Value Date     WBC 9.2 07/24/2023    HGB 11.2 (L) 07/24/2023    HCT 39.1 07/24/2023     07/24/2023     BMP:   Lab Results   Component Value Date     07/24/2023    K 4.3 07/24/2023     07/24/2023    CO2 21 07/24/2023    BUN 13 07/24/2023    CREATININE 0.76 07/24/2023    CALCIUM 9.2 07/24/2023    MG 2.48 (H) 07/24/2023    PHOS 1.7 (L) 12/06/2022     LFT:   Lab Results   Component Value Date    ALKPHOS 93 07/24/2023    BILITOT 0.5 07/24/2023    BILIDIR 0.1 10/12/2018    PROT 8.2 07/24/2023    ALBUMIN 3.4 07/24/2023    ALT 13 07/24/2023    AST 22 07/24/2023         Assessment/Plan   Problem List Items Addressed This Visit    None  Visit Diagnoses         Codes    Atypical parkinsonism    -  Primary G20.C         Change timing of sinemet every 5 hours while awake, tolerating it well and it is helping tremors/ mobility.  Back pain - see pain mgmt. ? Increase gabapentin slightly.  Follow up in Feb  Time with patient and care 16 min

## 2024-01-16 PROBLEM — M46.28 SACRAL OSTEOMYELITIS (MULTI): Status: ACTIVE | Noted: 2024-01-01

## 2024-01-16 NOTE — CONSULTS
"Reason For Consult  Sacral wound     History Of Present Illness  Irina Stratton is a 73 y.o. female who presented to [facility] ED  from home with concerns for sacral wound infection. Per ED notes, patient with altered mental status and fever. Lives at home. Is cared for by family for her advancing Parkinson's.   Patient relatively poor historian and unable to fully describe symptoms to this provider. ROS and HPI obtained from chart review.   Patient was apparently started on outpatient antibiotics yesterday with concern for sacral wound infection. No outpatient notes/records could be found to corroborate this information. Med list does include Augmentin.     In ED, patient with positive UA with culture pending. Blood cultures sent. Mildly elevated serum lactate at 2.1. Leukocytosis of 16.  Hemodynamically stable and afebrile  CT A/P significant for soft tissue changes with underlying sacral osteomyelitis. Received IV Vanc and Zosyn.      On exam, patient denies pain at rest. During turning/palpation of sacral wound, patient did say \"ouch\". Denies chills at present, but states \"yes\" she had chills at home. Unable to elaborate on time frame.      Past Medical History  She has a past medical history of Abnormal radiologic findings on diagnostic imaging of right kidney and Personal history of other medical treatment.    Surgical History  She has a past surgical history that includes  section, classic (2016); Other surgical history (2020); Other surgical history (2020); Other surgical history (2020); Other surgical history (2020); Other surgical history (2019); CT angio abdomen pelvis w and or wo IV IV contrast (2019); and CT angio aorta and bilateral iliofemoral runoff w and or wo IV contrast (2020).     Social History  She reports that she quit smoking about 12 months ago. Her smoking use included cigarettes. She has never used smokeless tobacco. She reports " that she does not currently use alcohol. She reports that she does not use drugs.    Family History  Family History   Problem Relation Name Age of Onset    Stroke Mother          Allergies  Patient has no known allergies.    Review of Systems  Unable to fully complete     Physical Exam:  Constitutional:       Frail, thin, elderly, and chronically-ill appearing elderly female. Appears older than stated age. Resting with legs bent in hospital bed      HENT:     MMM  Eyes:      sclera white  Cardiovascular:      palpable pulses   Pulmonary:     Comfortable WOB on room air  Abdominal:      Sunken, soft, non tender  Skin:     Stage I pressure ulcer left ischium. Tunneling at least stage III pressure ulcer sacrum - brown purulent drainage, mild surrounding erythema, + fluctuance lateral to sacral wound opening, but improved s/p palpation. Wound is 2.5cm long by 0.75cm wide and tunnels 5.5cm inferior, at 6 o'clock position.   > Carole-wound stained purple from previous Gentian Violet solution usage     Extremities:     Very thin, no edema. No clubbing of nailbeds. Nearly contracted BLE  Neurological:      Quite verbal responses, simple 1-2 word answers. Disoriented to time.  Psychiatric:         Calm      Last Recorded Vitals  Blood pressure 142/64, pulse 92, temperature 36.6 °C (97.9 °F), resp. rate 20, weight 47.6 kg (104 lb 15 oz), SpO2 96 %.    Relevant Results  Scheduled medications  [START ON 1/17/2024] calcitriol, 0.25 mcg, oral, Once per day on Mon Wed Fri  carbidopa-levodopa, 1 tablet, oral, TID  cholecalciferol, 1,000 Units, oral, Daily  DULoxetine, 30 mg, oral, Daily  gabapentin, 100 mg, oral, TID  insulin lispro, 0-10 Units, subcutaneous, q4h  levothyroxine, 88 mcg, oral, Daily  metoprolol tartrate, 25 mg, oral, BID  rivaroxaban, 20 mg, oral, Daily  rosuvastatin, 40 mg, oral, Daily  tamsulosin, 0.4 mg, oral, Daily      Continuous medications  sodium chloride 0.9%, 50 mL/hr, Last Rate: 50 mL/hr (01/16/24  0529)      PRN medications  PRN medications: acetaminophen, dextrose 10 % in water (D10W), dextrose, glucagon, oxyCODONE-acetaminophen    Results for orders placed or performed during the hospital encounter of 01/16/24 (from the past 24 hour(s))   CBC and Auto Differential   Result Value Ref Range    WBC 16.0 (H) 4.4 - 11.3 x10*3/uL    nRBC 0.0 0.0 - 0.0 /100 WBCs    RBC 4.14 4.00 - 5.20 x10*6/uL    Hemoglobin 10.9 (L) 12.0 - 16.0 g/dL    Hematocrit 38.4 36.0 - 46.0 %    MCV 93 80 - 100 fL    MCH 26.3 26.0 - 34.0 pg    MCHC 28.4 (L) 32.0 - 36.0 g/dL    RDW 19.6 (H) 11.5 - 14.5 %    Platelets 278 150 - 450 x10*3/uL    Neutrophils % 85.3 40.0 - 80.0 %    Immature Granulocytes %, Automated 0.6 0.0 - 0.9 %    Lymphocytes % 8.0 13.0 - 44.0 %    Monocytes % 5.6 2.0 - 10.0 %    Eosinophils % 0.3 0.0 - 6.0 %    Basophils % 0.2 0.0 - 2.0 %    Neutrophils Absolute 13.66 (H) 1.60 - 5.50 x10*3/uL    Immature Granulocytes Absolute, Automated 0.10 0.00 - 0.50 x10*3/uL    Lymphocytes Absolute 1.28 0.80 - 3.00 x10*3/uL    Monocytes Absolute 0.90 (H) 0.05 - 0.80 x10*3/uL    Eosinophils Absolute 0.05 0.00 - 0.40 x10*3/uL    Basophils Absolute 0.04 0.00 - 0.10 x10*3/uL   Comprehensive Metabolic Panel   Result Value Ref Range    Glucose 216 (H) 74 - 99 mg/dL    Sodium 147 (H) 136 - 145 mmol/L    Potassium 4.5 3.5 - 5.3 mmol/L    Chloride 112 (H) 98 - 107 mmol/L    Bicarbonate 24 21 - 32 mmol/L    Anion Gap 16 10 - 20 mmol/L    Urea Nitrogen 34 (H) 6 - 23 mg/dL    Creatinine 0.96 0.50 - 1.05 mg/dL    eGFR 63 >60 mL/min/1.73m*2    Calcium 9.5 8.6 - 10.3 mg/dL    Albumin 3.3 (L) 3.4 - 5.0 g/dL    Alkaline Phosphatase 88 33 - 136 U/L    Total Protein 8.8 (H) 6.4 - 8.2 g/dL    AST 44 (H) 9 - 39 U/L    Bilirubin, Total 0.7 0.0 - 1.2 mg/dL    ALT 21 7 - 45 U/L   Lactate   Result Value Ref Range    Lactate 2.1 (H) 0.4 - 2.0 mmol/L   Blood Culture    Specimen: Peripheral Venipuncture; Blood culture   Result Value Ref Range    Blood Culture  Loaded on Instrument - Culture in progress    Blood Culture    Specimen: Peripheral Venipuncture; Blood culture   Result Value Ref Range    Blood Culture Loaded on Instrument - Culture in progress    Blood Gas Venous Full Panel   Result Value Ref Range    POCT pH, Venous 7.54 (H) 7.33 - 7.43 pH    POCT pCO2, Venous 30 (L) 41 - 51 mm Hg    POCT pO2, Venous 88 (H) 35 - 45 mm Hg    POCT SO2, Venous 99 (H) 45 - 75 %    POCT Oxy Hemoglobin, Venous 93.8 (H) 45.0 - 75.0 %    POCT Hematocrit Calculated, Venous 35.0 (L) 36.0 - 46.0 %    POCT Sodium, Venous 146 (H) 136 - 145 mmol/L    POCT Potassium, Venous 4.2 3.5 - 5.3 mmol/L    POCT Chloride, Venous 115 (H) 98 - 107 mmol/L    POCT Ionized Calicum, Venous 1.14 1.10 - 1.33 mmol/L    POCT Glucose, Venous 249 (H) 74 - 99 mg/dL    POCT Lactate, Venous 3.0 (H) 0.4 - 2.0 mmol/L    POCT Base Excess, Venous 3.6 (H) -2.0 - 3.0 mmol/L    POCT HCO3 Calculated, Venous 25.7 22.0 - 26.0 mmol/L    POCT Hemoglobin, Venous 11.5 (L) 12.0 - 16.0 g/dL    POCT Anion Gap, Venous 10.0 10.0 - 25.0 mmol/L    Patient Temperature 37.0 degrees Celsius    FiO2 21 %   Urinalysis with Reflex Culture and Microscopic   Result Value Ref Range    Color, Urine Leigh (N) Straw, Yellow    Appearance, Urine Hazy (N) Clear    Specific Gravity, Urine 1.022 1.005 - 1.035    pH, Urine 6.0 5.0, 5.5, 6.0, 6.5, 7.0, 7.5, 8.0    Protein, Urine >=500 (3+) (N) NEGATIVE mg/dL    Glucose, Urine NEGATIVE NEGATIVE mg/dL    Blood, Urine NEGATIVE NEGATIVE    Ketones, Urine NEGATIVE NEGATIVE mg/dL    Bilirubin, Urine NEGATIVE NEGATIVE    Urobilinogen, Urine <2.0 <2.0 mg/dL    Nitrite, Urine NEGATIVE NEGATIVE    Leukocyte Esterase, Urine SMALL (1+) (A) NEGATIVE   Extra Urine Gray Tube   Result Value Ref Range    Extra Tube Hold for add-ons.    Sars-CoV-2 and Influenza A/B PCR   Result Value Ref Range    Flu A Result Not Detected Not Detected    Flu B Result Not Detected Not Detected    Coronavirus 2019, PCR Not Detected Not  Detected   RSV PCR   Result Value Ref Range    RSV PCR Not Detected Not Detected   Microscopic Only, Urine   Result Value Ref Range    WBC, Urine 21-50 (A) 1-5, NONE /HPF    WBC Clumps, Urine OCCASIONAL Reference range not established. /HPF    RBC, Urine 1-2 NONE, 1-2, 3-5 /HPF    Bacteria, Urine 4+ (A) NONE SEEN /HPF   Light Blue Top   Result Value Ref Range    Extra Tube Hold for add-ons.    Lavender Top   Result Value Ref Range    Extra Tube Hold for add-ons.    Lactate   Result Value Ref Range    Lactate 2.1 (H) 0.4 - 2.0 mmol/L   POCT GLUCOSE   Result Value Ref Range    POCT Glucose 150 (H) 74 - 99 mg/dL       CT abdomen pelvis wo IV contrast  Result Date: 1/16/2024    Soft tissue thickening overlying the mid to lower sacrum with underlying erosive changes and adjacent punctate lucencies. Findings suggest an age-indeterminate soft tissue infection along with associated osteomyelitis.   Large colonic stool burden and associated rectal wall thickening. Findings may represent a component of fecal impaction. Stercoral colitis is also not excluded in the appropriate clinical setting.   Aorto bi-iliac stent graft repair of an infrarenal aortic aneurysm measuring up to 4.6 cm in maximum AP dimension.   MACRO: None.   Signed by: Evan Finkelstein 1/16/2024 1:56 AM Dictation workstation:   [id number]        Assessment/Plan     73 year old female with a past medical history significant for Parkinson's, CKD, hypothyroidism, Afib on Xarelto, NIDDM Type 2, HTN, HLD, PVD, AAA s/p Aorto bi-iliac stent graft repair, CAD, anemia, and depression presented to [facility] ED on 1/16 from home with concerns of sacral wound infection.     Impression:  Sacral decubitus pressure ulcer (Stage IV) with infection and osteomyelitis    > recent wound culture 1/3 with MSSA  Left ischial pressure ulcer (Appears stage I, may have been healed deeper stage in past)  Likely UTI  Leukocytosis 2/2 above  Hypernatremia     Recommendations:  - no  immediate surgical debridement is indicated. Despite infection, tunneled wound itself appears to have healthy tissue underlying. Would not recommend any further debridement or wound opening  - follow up blood and urine culture results   - antibiotic management per primary   - consult wound care RN for local wound recommendations   > for now, daily dressing changes with Calcium alginate and Mepilex over sacrum  - turning/repositioning q2h  - low air-loss mattress  - okay for home meds including Xarelto     The patient will be discussed with the attending surgeon on-call Dr. Mccain who will personally evaluate wound tomorrow. Please await final attending attestation     I spent 30 minutes in the professional and overall care of this patient.  Greater than 50% of this time was spent counseling patient, reviewing plan of care, and in coordination of care.       ACE Sevilla, CNP  Trauma/General Surgery [facility]  Phone: [phone]

## 2024-01-16 NOTE — H&P
History Of Present Illness  Irina Stratton is a 73 y.o. female presenting to emergency department for evaluation of a sacral wound.  Patient was started on antibiotics yesterday for a sacral ulcer.  Patient presented today for altered mental status.  Patient confused and unable to provide information.    In ED, UTI positive, urine culture pending.  Blood cultures pending.  Glucose 216, sodium 147, chloride 112, BUN 34, lactate 2.1 with repeat pending.  WBC 16.0, hemoglobin 10.9.  Blood pressure 156/76, heart rate 91, respirations 16, temperature 37.3 °C, SpO2 95% on room air.  Imaging completed showing osteomyelitis with erosive changes.  Patient started on IV vancomycin and Zosyn.  Patient will be admitted under the care of Dr. Fitzgerald who will continue to follow.  I was asked to do H&P and place initial admission orders.     Past Medical History  Parkinson's, CKD, hypothyroidism, A-fib, diabetes, hypertension, hyperlipidemia, AAA, CAD, anemia, depression, MI, cholelithiasis  Surgical History  Cataract surgery, , AAA repair, renal angioplasty, angioplasty, stent placement  Social History  Former smoker (quit in ), no drug use, no alcohol use  Family History  Stroke       Allergies  Patient has no known allergies.    Review of Systems   Unable to perform ROS: Mental status change        Physical Exam  HENT:      Mouth/Throat:      Mouth: Mucous membranes are dry.      Pharynx: Oropharynx is clear.   Eyes:      Pupils: Pupils are equal, round, and reactive to light.   Cardiovascular:      Rate and Rhythm: Normal rate and regular rhythm.   Pulmonary:      Breath sounds: Normal breath sounds.   Abdominal:      General: Bowel sounds are normal.      Palpations: Abdomen is soft.   Musculoskeletal:         General: Normal range of motion.      Cervical back: Normal range of motion.   Skin:     General: Skin is warm and dry.      Capillary Refill: Capillary refill takes less than 2 seconds.      Comments:  Sacral ulcer with bone exposed   Neurological:      Mental Status: She is lethargic, disoriented and confused.          Last Recorded Vitals  Blood pressure 158/75, pulse 86, temperature 37.3 °C (99.1 °F), temperature source Skin, resp. rate 16, weight 47.6 kg (104 lb 15 oz), SpO2 95 %.    Relevant Results  CT abdomen pelvis wo IV contrast    Result Date: 1/16/2024  Interpreted By:  Finkelstein, Evan, STUDY: CT ABDOMEN PELVIS WO IV CONTRAST;  1/16/2024 1:28 am   INDICATION: Signs/Symptoms:sacral osteomyelitis.   COMPARISON: CT abdomen pelvis 06/29/2023   ACCESSION NUMBER(S): [id number]   ORDERING CLINICIAN: FILIBERTO MOSELEY   TECHNIQUE: Axial noncontrast CT images of the abdomen and pelvis with coronal and sagittal reconstructed images.   FINDINGS: LOWER CHEST: Dependent atelectasis. There are coronary artery calcifications.   ABDOMEN: Lack of intravenous contrast limits evaluation of vessels and solid organs. LIVER: Normal attenuation and contour. BILE DUCTS: Normal caliber. GALLBLADDER: No calcified gallstones. No wall thickening. SPLEEN: Unremarkable. PANCREAS: Unremarkable. ADRENALS: Unremarkable.   KIDNEYS, URETERS, URINARY BLADDER: No hydronephrosis or urinary tract calculus. Hyperdense lesions in the kidneys bilaterally are favored to represent proteinaceous or hemorrhagic cysts. Additional hypodense lesions measure simple fluid attenuation and are most compatible with simple cysts. The urinary bladder is decompressed with a Rain catheter. REPRODUCTIVE ORGANS: No abnormality, given limitations of the noncontrast CT.  No significant free pelvic fluid.   ABDOMINAL WALL: Within normal limits. PERITONEUM: No ascites or free air.   BOWEL: The stomach is decompressed, which limits evaluation. No small or large bowel dilatation. Large colonic stool burden. There is rectal wall thickening.   VESSELS: Aorto bi-iliac stent graft repair of an aortic aneurysm measuring up to approximately 4.6 cm. RETROPERITONEUM: No  pathologically enlarged lymph nodes.   BONES: There is soft tissue thickening overlying the mid to lower sacrum. There are erosive changes involving the underlying sacrum with adjacent punctate lucencies likely related to gas/infection. No       Soft tissue thickening overlying the mid to lower sacrum with underlying erosive changes and adjacent punctate lucencies. Findings suggest an age-indeterminate soft tissue infection along with associated osteomyelitis.   Large colonic stool burden and associated rectal wall thickening. Findings may represent a component of fecal impaction. Stercoral colitis is also not excluded in the appropriate clinical setting.   Aorto bi-iliac stent graft repair of an infrarenal aortic aneurysm measuring up to 4.6 cm in maximum AP dimension.   MACRO: None.   Signed by: Evan Finkelstein 1/16/2024 1:56 AM Dictation workstation:   [id number]   Results for orders placed or performed during the hospital encounter of 01/16/24 (from the past 24 hour(s))   CBC and Auto Differential   Result Value Ref Range    WBC 16.0 (H) 4.4 - 11.3 x10*3/uL    nRBC 0.0 0.0 - 0.0 /100 WBCs    RBC 4.14 4.00 - 5.20 x10*6/uL    Hemoglobin 10.9 (L) 12.0 - 16.0 g/dL    Hematocrit 38.4 36.0 - 46.0 %    MCV 93 80 - 100 fL    MCH 26.3 26.0 - 34.0 pg    MCHC 28.4 (L) 32.0 - 36.0 g/dL    RDW 19.6 (H) 11.5 - 14.5 %    Platelets 278 150 - 450 x10*3/uL    Neutrophils % 85.3 40.0 - 80.0 %    Immature Granulocytes %, Automated 0.6 0.0 - 0.9 %    Lymphocytes % 8.0 13.0 - 44.0 %    Monocytes % 5.6 2.0 - 10.0 %    Eosinophils % 0.3 0.0 - 6.0 %    Basophils % 0.2 0.0 - 2.0 %    Neutrophils Absolute 13.66 (H) 1.60 - 5.50 x10*3/uL    Immature Granulocytes Absolute, Automated 0.10 0.00 - 0.50 x10*3/uL    Lymphocytes Absolute 1.28 0.80 - 3.00 x10*3/uL    Monocytes Absolute 0.90 (H) 0.05 - 0.80 x10*3/uL    Eosinophils Absolute 0.05 0.00 - 0.40 x10*3/uL    Basophils Absolute 0.04 0.00 - 0.10 x10*3/uL   Comprehensive Metabolic Panel    Result Value Ref Range    Glucose 216 (H) 74 - 99 mg/dL    Sodium 147 (H) 136 - 145 mmol/L    Potassium 4.5 3.5 - 5.3 mmol/L    Chloride 112 (H) 98 - 107 mmol/L    Bicarbonate 24 21 - 32 mmol/L    Anion Gap 16 10 - 20 mmol/L    Urea Nitrogen 34 (H) 6 - 23 mg/dL    Creatinine 0.96 0.50 - 1.05 mg/dL    eGFR 63 >60 mL/min/1.73m*2    Calcium 9.5 8.6 - 10.3 mg/dL    Albumin 3.3 (L) 3.4 - 5.0 g/dL    Alkaline Phosphatase 88 33 - 136 U/L    Total Protein 8.8 (H) 6.4 - 8.2 g/dL    AST 44 (H) 9 - 39 U/L    Bilirubin, Total 0.7 0.0 - 1.2 mg/dL    ALT 21 7 - 45 U/L   Lactate   Result Value Ref Range    Lactate 2.1 (H) 0.4 - 2.0 mmol/L   Blood Gas Venous Full Panel   Result Value Ref Range    POCT pH, Venous 7.54 (H) 7.33 - 7.43 pH    POCT pCO2, Venous 30 (L) 41 - 51 mm Hg    POCT pO2, Venous 88 (H) 35 - 45 mm Hg    POCT SO2, Venous 99 (H) 45 - 75 %    POCT Oxy Hemoglobin, Venous 93.8 (H) 45.0 - 75.0 %    POCT Hematocrit Calculated, Venous 35.0 (L) 36.0 - 46.0 %    POCT Sodium, Venous 146 (H) 136 - 145 mmol/L    POCT Potassium, Venous 4.2 3.5 - 5.3 mmol/L    POCT Chloride, Venous 115 (H) 98 - 107 mmol/L    POCT Ionized Calicum, Venous 1.14 1.10 - 1.33 mmol/L    POCT Glucose, Venous 249 (H) 74 - 99 mg/dL    POCT Lactate, Venous 3.0 (H) 0.4 - 2.0 mmol/L    POCT Base Excess, Venous 3.6 (H) -2.0 - 3.0 mmol/L    POCT HCO3 Calculated, Venous 25.7 22.0 - 26.0 mmol/L    POCT Hemoglobin, Venous 11.5 (L) 12.0 - 16.0 g/dL    POCT Anion Gap, Venous 10.0 10.0 - 25.0 mmol/L    Patient Temperature 37.0 degrees Celsius    FiO2 21 %   Urinalysis with Reflex Culture and Microscopic   Result Value Ref Range    Color, Urine Leigh (N) Straw, Yellow    Appearance, Urine Hazy (N) Clear    Specific Gravity, Urine 1.022 1.005 - 1.035    pH, Urine 6.0 5.0, 5.5, 6.0, 6.5, 7.0, 7.5, 8.0    Protein, Urine >=500 (3+) (N) NEGATIVE mg/dL    Glucose, Urine NEGATIVE NEGATIVE mg/dL    Blood, Urine NEGATIVE NEGATIVE    Ketones, Urine NEGATIVE NEGATIVE mg/dL     Bilirubin, Urine NEGATIVE NEGATIVE    Urobilinogen, Urine <2.0 <2.0 mg/dL    Nitrite, Urine NEGATIVE NEGATIVE    Leukocyte Esterase, Urine SMALL (1+) (A) NEGATIVE   Sars-CoV-2 and Influenza A/B PCR   Result Value Ref Range    Flu A Result Not Detected Not Detected    Flu B Result Not Detected Not Detected    Coronavirus 2019, PCR Not Detected Not Detected   RSV PCR   Result Value Ref Range    RSV PCR Not Detected Not Detected   Microscopic Only, Urine   Result Value Ref Range    WBC, Urine 21-50 (A) 1-5, NONE /HPF    WBC Clumps, Urine OCCASIONAL Reference range not established. /HPF    RBC, Urine 1-2 NONE, 1-2, 3-5 /HPF    Bacteria, Urine 4+ (A) NONE SEEN /HPF   Light Blue Top   Result Value Ref Range    Extra Tube Hold for add-ons.    Lavender Top   Result Value Ref Range    Extra Tube Hold for add-ons.        Assessment/Plan   Irina is a 73-year-old female patient presenting to emergency department for altered mental status.  Patient was recently started on p.o. antibiotics for a sacral ulcer.  Patient found to have elevated WBCs, elevated lactate.  Repeat lactate pending.  Imaging showing osteomyelitis.  Bone exposed on examination.  Urine culture and blood cultures pending.  Positive UTI.  Patient given IV fluids and started on IV vancomycin and Zosyn.  Admission for further medical management.    Sacral osteomyelitis/UTI/acute metabolic encephalopathy  Admit to Dr. Fitzgerald  See imaging results above  Blood cultures pending  Urine cultures pending  Bedside swallow evaluation  N.p.o. until passed swallow evaluation  Continue IV fluids  Tylenol as needed  Continue IV vancomycin/Zosyn  Repeat lactate pending  Defer to attending for wound care orders    CKD/A-fib/hypothyroidism/diabetes/hypertension/Parkinson's/hyperlipidemia/CAD/anemia/depression  N.p.o. until passed swallow evaluation then diabetic diet  ISS  Hold home oral antidiabetic medications  Continue home medications after passed swallow evaluation    DVT  Ppx  SCDs  Continue home Xarelto after passed swallow evaluation  Up to chair    I spent 30 minutes in the professional and overall care of this patient.    Fully evaluated  Fabiloa Owen, ACE-CNP

## 2024-01-16 NOTE — CONSULTS
“Wound” Ostomy Consultation        1. Chief Complaint: wound check and evaluation   2. History of Present Illness:   sacral pressure injury with a large cavity   3. Past Medical History: Reviewed   4. Past Surgical History: Reviewed  5. Allergies:     Reviewed  6. Social/Family History: Reviewed  7. Medications:  Reviewed  8. Labs/Data/Test Results: Reviewed   9. Progress Notes:  Reviewed     10. System Review: system review was performed with these findings:   Integumentary: Will be described below    11.  Physical Examination:   Integumentary: Normal skin color, texture, and turgor, No dry/scaly/cracked skin; No ecchymotic areas; No friable skin; No rash/lesions/excoriation/burns; No diaphoresis; No jaundice, lorin, pallor skin;     12. Wound Pain/Discomfort: yes when depth was measured      13. Wound Assessment  Type of Wound: pressure   Stage/Thickness Level: IV  Location: left distal   Site: hip   Present on admission: yes   Photo in EMR   Tunneling: No    Undermining: No     Edges: pink,   Odor: none,   Edema: not present  Exudate: none  Tissue Color: pink  Tissue Type: granulation and epithelial ????????????????????????  Carole Wound Tissue: No erythema or warmth, indurated tissue, macerated tissue, desiccated tissue, + healthy scar tissue??????????????????????????  Carole Wound Tissue Color: pink,     14. Wound Assessment  Type of Wound: pressure   Stage/Thickness Level: IV  Location: left proximal   Site: hip   Present on admission: yes   Photo in EMR   Tunneling: No    Undermining: No     Edges: pink,   Odor: none,   Edema: not present  Exudate: none  Tissue Color: pink scant yellow in center   Tissue Type: granulation and epithelial ????????????????????????  Carole Wound Tissue: No erythema or warmth, indurated tissue, macerated tissue, desiccated tissue, + healthy scar tissue??????????????????????????  Carole Wound Tissue Color: pink,     15. Wound Assessment  Type of Wound: pressure   Stage/Thickness Level: IV    Site: sacrum   Present on admission: yes   Photo in EMR   Tunneling: No    Undermining: 3 o'clock to 9 o'clock deepest at 6 o'clock = 5.5   Edges: pink,   Odor: none,   Edema: present  Exudate: none  Tissue Color: dusky pink   Tissue Type: unhealthy soft sponge like hypergranulation ????????????????????????  Yiafn Wound Tissue: No erythema or warmth, indurated tissue, macerated tissue, desiccated tissue, healthy scar tissue??????????????????????????  Yifan Wound Tissue Color: darker skin tone      Assessment/Plan/Treatment Recommendations:     Sacral pressure ulcer: irrigate with 10 ml of dakin's full strength solution using a 10 ml syringe with a blunt needle. Point toward distal base at 6 o'clock, Apply Hydrocortisone 1% cream to the base, then apply Bactroban 2% ointment, pack with 1/4 or 1/2 plain ribbon packing toward 6 o'clock, single layer 3 cm long, cover with foam, change 2 times a day  Left hip wounds, wash with dakin's full strength solution. Dry well, apply foam, change daily   Turn as per policy offloading from pressure sites(s)  Interventions as per Tommy Scale are in place    Education provided; Treatment demonstrated; Questions answered  D/W RN / MD   Orders received     Ulcer should not be fully packed. This action is delaying the healing of the ulcer,   Gentian blue is not needed as the yifan ulcer skin is healthy this was washed off her skin     I have spent 60 minutes with the patient for physical and wound assessment, wound cleaning, dressing demonstration and answering questions. More than 50% of the time spent with the patient/ and daughter included education/counselling/coordination of care.     Medina Vincent RN WOCN

## 2024-01-16 NOTE — PROGRESS NOTES
01/16/24 1423   Discharge Planning   Living Arrangements Spouse/significant other   Support Systems Spouse/significant other   Assistance Needed bed/ wheelchair   Type of Residence Private residence   [facility]     1/26/2024  Per notes- pt not oriented.  No family present.  Called and spoke with pt's  Jose, introduced self and explained role.  Verified insurance, address, contact.   PCP- Dr Felipe Rojo  Pt is from home, lives with spouse.  Does not stand or walk. Stated they have a hospital bed. And  transfers pt to the wheelchair. They have a stair lift to go go down the steps to the garage.  does the transportation to MD visits.   cares for the pt- bathing, dressing. Stated he does the cooking and feeds pt. Stated she doesn't eat much. So he supplements with Boost. Stated they have [facility] for dressing changes 3 times a week and he does it the other days.  Stated he would like to continue with them.   If Antibiotics needed-  would consider SNF- depending on duration (about a week) if longer he would prefer home and he would learn. Support provided and questions answered. Ct Team will continue to follow for needs.   Aminah Luna RN TCC

## 2024-01-16 NOTE — CARE PLAN
The patient's goals for the shift include      The clinical goals for the shift include Pt. safety    Problem: Skin  Goal: Decreased wound size/increased tissue granulation at next dressing change  Outcome: Progressing  Goal: Participates in plan/prevention/treatment measures  Outcome: Progressing  Goal: Prevent/manage excess moisture  Outcome: Progressing  Goal: Prevent/minimize sheer/friction injuries  Outcome: Progressing  Goal: Promote/optimize nutrition  Outcome: Progressing  Goal: Promote skin healing  Outcome: Progressing     Problem: Pain  Goal: My pain/discomfort is manageable  Outcome: Progressing     Problem: Safety  Goal: Patient will be injury free during hospitalization  Outcome: Progressing  Goal: I will remain free of falls  Outcome: Progressing     Problem: Daily Care  Goal: Daily care needs are met  Outcome: Progressing     Problem: Psychosocial Needs  Goal: Demonstrates ability to cope with hospitalization/illness  Outcome: Progressing  Goal: Collaborate with me, my family, and caregiver to identify my specific goals  Outcome: Progressing     Problem: Discharge Barriers  Goal: My discharge needs are met  Outcome: Progressing

## 2024-01-16 NOTE — ED PROVIDER NOTES
HPI   Chief Complaint   Patient presents with    Fever    Lethargy       Patient presents for altered mental status and fever.  Patient was started on antibiotics yesterday for a sacral ulcer.  She has advanced Parkinson's.  She is cared for by her  at home.                          Марина Coma Scale Score: 13                  Patient History   Past Medical History:   Diagnosis Date    Abnormal radiologic findings on diagnostic imaging of right kidney     Abnormal ultrasound of both kidneys    Personal history of other medical treatment     History of echocardiogram     Past Surgical History:   Procedure Laterality Date     SECTION, CLASSIC  2016     Section    CT ABDOMEN PELVIS ANGIOGRAM W AND/OR WO IV CONTRAST  2019    CT ABDOMEN PELVIS ANGIOGRAM W AND/OR WO IV CONTRAST 2019 [facility]    CT AORTA AND BILATERAL ILIOFEMORAL RUNOFF ANGIOGRAM W AND/OR WO IV CONTRAST  2020    CT AORTA AND BILATERAL ILIOFEMORAL RUNOFF ANGIOGRAM W AND/OR WO IV CONTRAST 2020 [facility]GAC[facility]    OTHER SURGICAL HISTORY  2020    History of prior surgery    OTHER SURGICAL HISTORY  2020    Coronary artery stent placement    OTHER SURGICAL HISTORY  2020    Abdominal aortic aneurysm repair endovascular    OTHER SURGICAL HISTORY  2020    Cardiac catheterization    OTHER SURGICAL HISTORY  2019    Renal angioplasty and stenting     Family History   Problem Relation Name Age of Onset    Stroke Mother       Social History     Tobacco Use    Smoking status: Former     Types: Cigarettes     Quit date:      Years since quittin.0    Smokeless tobacco: Never   Vaping Use    Vaping Use: Never used   Substance Use Topics    Alcohol use: Not Currently    Drug use: Never       Physical Exam   ED Triage Vitals [24 0121]   Temp Heart Rate Resp BP   -- 110 19 172/89      SpO2 Temp src Heart Rate Source Patient Position   93 % -- -- --      BP Location  FiO2 (%)     -- --       Physical Exam  Vitals and nursing note reviewed.   Constitutional:       General: She is not in acute distress.     Appearance: She is well-developed.   HENT:      Head: Normocephalic and atraumatic.   Eyes:      Conjunctiva/sclera: Conjunctivae normal.   Cardiovascular:      Rate and Rhythm: Normal rate and regular rhythm.      Heart sounds: No murmur heard.  Pulmonary:      Effort: Pulmonary effort is normal. No respiratory distress.      Breath sounds: Normal breath sounds.   Abdominal:      Palpations: Abdomen is soft.      Tenderness: There is no abdominal tenderness.   Musculoskeletal:         General: No swelling.      Cervical back: Neck supple.   Skin:     General: Skin is warm and dry.      Capillary Refill: Capillary refill takes less than 2 seconds.      Comments: Sacral ulcer with tunneling bone appears to be exposed.   Neurological:      Mental Status: She is alert.      Comments: Patient is stuporous.  She follows simple commands.   Psychiatric:         Mood and Affect: Mood normal.         ED Course & MDM   Diagnoses as of 01/16/24 0249   Sacral osteomyelitis (CMS/[facility])   Urinary tract infection associated with indwelling urethral catheter, initial encounter (CMS/[facility])   Sepsis, due to unspecified organism, unspecified whether acute organ dysfunction present (CMS/[facility])       Medical Decision Making  Differential diagnosis urinary tract infection, osteomyelitis, sepsis, urinary tract infection.    Patient was given 30 mL/kg bolus of normal saline.  She was given Zosyn 4.5 g IV as well as 1 g of vancomycin intravenously was given.  CT pelvis concerning for sacral osteomyelitis.  Urinalysis has pyuria.  She does have a chronic indwelling Rain catheter.  Rain catheter was replaced.  Patient has a leukocytosis.  Patient is also dehydrated with hypernatremia and elevated BUN to creatinine ratio.  Prior medical records were reviewed.  Answered all of 's questions.  Discussed  case with Dr. Fitzgerald and the patient will be admitted to the hospital.        Procedure  Critical Care    Performed by: Balaji Wheatley MD  Authorized by: Balaji Wehatley MD    Critical care provider statement:     Critical care time (minutes):  32    Critical care time was exclusive of:  Separately billable procedures and treating other patients    Critical care was necessary to treat or prevent imminent or life-threatening deterioration of the following conditions:  Sepsis    Critical care was time spent personally by me on the following activities:  Ordering and performing treatments and interventions, ordering and review of laboratory studies, ordering and review of radiographic studies, pulse oximetry, re-evaluation of patient's condition and review of old charts    Care discussed with: admitting provider         Balaji Wheatley MD  01/16/24 0251

## 2024-01-16 NOTE — PROGRESS NOTES
Speech-Language Pathology    Inpatient Speech-Language Pathology Clinical Swallow Evaluation    Patient Name: Irina Stratton  MRN: [id number]  Today's Date: 1/16/2024   Time Calculation  Start Time: 1535  Stop Time: 1605  Time Calculation (min): 30 min       AMS with low oral intake. Per family pt only really drinks at home .   Minimal solids consumed per the  report  even though oral diet is Regular solids and thins at home.  Pt lives with her  whom is her caretaker as pt bed bound and  requires being fed. Pt admitted for a sacral wound.  Dx UTI , Bun 34 WBC 16.0   Current Problem:   1. Sacral osteomyelitis (CMS/[facility])        2. Urinary tract infection associated with indwelling urethral catheter, initial encounter (CMS/[facility])        3. Sepsis, due to unspecified organism, unspecified whether acute organ dysfunction present (CMS/[facility])              Recommendations:  Solid Diet Recommendations : Pureed/extremely thick  (IDDSI Level 4)  Liquid Diet Recommendations: Thin (IDDSI Level 0)  Compensatory Swallowing Strategies: Alternate solids and liquids, Remain upright for 20-30 minutes after meals, Upright 90 degrees as possible for all oral intake, Other (Comment) (straws)  Medication Administration Recommendations: Crushed, With Pureed    Safe Swallowing strategies /Guidelines:  Upright postioning   Alternate solids by tsp and sips by straws  Small sips /bites  Only present PO intake when pt awake.alert.     Assessment:  Medical Staff Made Aware: Yes    Pt demonstrating Moderate Oral Dysphagia . Safe functional oropharyngeal swallow with Purees and thins by straw. No clinical s/sx of aspiration.     Plan:  Inpatient/Swing Bed or Outpatient: Inpatient  Treatment/Interventions: Assess diet tolerance, Diet recommendations, Patient/family education  SLP Plan: Skilled SLP  SLP Frequency: 2x per week  Duration: 1 week  SLP Discharge Recommendations: Skilled nursing facility placement  Diet Recommendations: Solid,  Liquid  Solid Consistency: Pureed/extremely thick (IDDSI Level 4)  Liquid Consistency: Thin (IDDSI Level 0)  Discussed POC: Caregiver/family  Patient/Caregiver Agreeable: Yes    Dysphagia goals :  Pt will tolerate the prescribed diet ( Puree/thins ) with improved oral intake and no s/s of aspiration.   Pt 's family will adhere to safe swallowing strategies during PO intake with > 90 % accuracy independently.  Pt will participate in PO trials of soft  textured solids with adequate oral phase of the swallow in > 90  % of trials for a possible diet upgrade.     Subjective , Alert, confused ,Ox1 with pt able to follow some simple commands at times inconsistently.   Current Problem:  AMS , sacral wound infection       General Visit Information:  Living Environment: Home  Ordering Physician: Amilcar  Reason for Referral: low oral intsake , assess oral diet skills  Prior Level of Function: Decreased function    CT abdomen-pelvis 24  IMPRESSION:  Soft tissue thickening overlying the mid to lower sacrum with  underlying erosive changes and adjacent punctate lucencies. Findings  suggest an age-indeterminate soft tissue infection along with  associated osteomyelitis.      Large colonic stool burden and associated rectal wall thickening.  Findings may represent a component of fecal impaction. Stercoral  colitis is also not excluded in the appropriate clinical setting.      Aorto bi-iliac stent graft repair of an infrarenal aortic aneurysm  measuring up to 4.6 cm in maximum AP dimension.         Vital Signs:     Pt on room air with SpO2 at 95 %   RR 16, stable respiratory skills     Objective     Past Medical History  Parkinson's, CKD, hypothyroidism, A-fib, diabetes, hypertension, hyperlipidemia, AAA, CAD, anemia, depression, MI, cholelithiasis  Surgical History  Cataract surgery, , AAA repair, renal angioplasty, angioplasty, stent placement  Social History  Former smoker (quit in ), no drug use, no alcohol  use  Family History  Stroke  Baseline Assessment:  Respiratory Status: Room air  Behavior/Cognition: Alert, Confused, Requires cueing  Patient Positioning: Upright in Bed      Pain:  Pain Assessment: CPOT (Critical Care Pain Observation Tool)  Pain Score: 0 - No pain       Oral/Motor Assessment:  Dentition: Poor Dental/Oral Hygiene, Other (Comment), Dentures (Upper Full), Dentures (Lower Partial), Some Missing Teeth  Oral Motor: Impaired Function  Facial Symmetry: Within Functional Limits  Breath Support: Adequate for speech      Consistencies Trialed:  Consistencies Trialed: Yes  Consistencies Trialed: Ice Chips, Thin (IDDSI Level 0) - Straw, Pureed/extremely thick (IDDSI Level 4), Regular (IDDSI Level 7), Soft & bite sized/chopped (IDDSI Level 6)      Clinical Observations:  Patient Positioning: Upright in Bed  Was The 3 oz Swallow Protocol Completed: Yes  Prolonged Oral Manipulation: Regular (IDDSI Level 7), Soft & Bite Sized/Chopped (IDDSI Level 6)  Impaired Mastication: Regular (IDDSI Level 7), Soft & Bite Sized/Chopped (IDDSI Level 6)      Clinical bedside swallow exam completed.   Family ( dtr and  present for exam) Pt consumed ice chips , multiple cup/straw sips of water, and 3 ounces of water via a straw via consecutive sips with no s/s of aspiration. Pt then presented with additional trials of purees and soft and bite sized solids as well as regular solids. Prolonged mastication and a vertical chewing pattern exhibited across textured solids reduced given puree boluses and liquid washes to improve timing of the oral swallow and posterior transit. Oral health reduced with pt unable to follow commands for an OME.  Tongue midline with clear vocal quality noted. No significant oral residue post the prolonged chewing-mashing behavior. No clinical s/sx of aspiration  although pt continually drinks without pausing d/t poor Cognition recommend small sips or 2-3 sips at a time.  voiced  understanding.  No s/s of a pharyngeal dysphagia with any solid consistency. Pt requires total assist with PO intake.          Inpatient:  Education Documentation      Education provided to the RN, MD, pt's  and dtr regarding oral diet recommendations , s/s of aspiration, safe swallowing strategies, and POC, Understanding indicated and will be reinforced.         Consultations/Referrals/Coordination of Services: Dietary consult for low oral intake.

## 2024-01-17 NOTE — PROGRESS NOTES
General Surgery Attending Note    My Acute Care Surgery ALBERTO (Advanced Practice Provider) Gracy evaluated this patient yesterday and placed a full consultation.  Please see that documentation for details.    I saw the patient with the ALBERTO Natalie today, and personally evaluated and examined the patient.  My findings are consistent with those of the ALBERTO.        Impression:      I examined the patient today with my nurse practitioner.  The patient was placed flat and supine on the hospital bed, and rotated to the right.  The Mepilex dressing was removed.  There is some mild erythema of the skin along the sacrococcygeal region sagittally measuring about 20 x 10 cm.  This blanches.  Centrally and superiorly, there is about a 2 x 1 cm open ulcerated region with some pink granulation tissue and some minimal seropurulent drainage.  This does not seem to track.  There is no evidence of any fluctuant mass or abscess.  Mepilex dressing replaced.  Well-tolerated.    CAT scan imaging the abdomen pelvis was personally reviewed-report and images; the imaging does demonstrate evidence of underlying osteomyelitis of the sacrum, with no soft tissue abscess or fluid collection.      Recommendation:    No surgical intervention is indicated.  This area of infected skin and soft tissue would not benefit from incision drainage or opening.  This would simply cause a larger open wound.  There are no areas of palpable fluctuance.  There is no evidence of abscess on CAT scan imaging.    Continue medical therapy and IV antibiotics.  The surgical service will sign off.  Call as needed.

## 2024-01-17 NOTE — CONSULTS
"Nutrition Initial Assessment:   Nutrition Assessment    Reason for Assessment: Admission nursing screening, Dietitian discretion (low BMI)    Patient is a 73 y.o. female presenting with: sacral osteomyelitis. Pt unable to provide much nutrition hx due to current mental status. Most of hx obtained from chart review.       Nutrition History:  Energy Intake:  (unknown)  Food and Nutrient History: Pt with no documented meal intakes this admission. Noted pt had ensure drink next to her in bed during assessment but it appears to have spilled all over the bed - nursing notified. Pt states she did eat breakfast but unable to give further details. States she likes chocolate better than other flavors of ONS. Case management note states family noticed pt was not eating much at home and they were supplementing diet with Boost. Unable to assess for presence of GI symptoms or difficulty chewing/swallowing.  Vitamin/Herbal Supplement Use: vitamin D3 per home med list  Food Allergies/Intolerances:  None  GI Symptoms:  unable to assess - none noted in chart review  Oral Problems:  unable to assess - none noted in chart review       Anthropometrics:  Height: 165.1 cm (5' 5\")   Weight: 47.6 kg (104 lb 15 oz)   BMI (Calculated): 17.46  IBW/kg (Dietitian Calculated): 56.8 kg          Weight History:   Wt Readings from Last 10 Encounters:   01/16/24 47.6 kg (104 lb 15 oz)   01/11/24 47.6 kg (105 lb)   01/03/24 47.6 kg (105 lb)   12/12/23 47.6 kg (105 lb)   12/08/23 47.6 kg (105 lb)   10/20/23 49.9 kg (110 lb)   07/24/23 52.2 kg (115 lb)   05/22/23 52.2 kg (115 lb)   03/27/23 54.9 kg (121 lb)   12/27/22 54.9 kg (121 lb)      Weight Change %:  Weight History / % Weight Change: Pt with 7.3 kg (13.3%) wt loss x 10 months, 4.6 kg (8.8%) wt loss x 6 months, and 2.3 kg (4.6%) wt loss x 3 months. Gradual but not significant wt loss.  Significant Weight Loss: No    Nutrition Focused Physical Exam Findings:    Subcutaneous Fat Loss:   Orbital Fat " Pads: Mild-Moderate (slight dark circles and slight hollowing)  Buccal Fat Pads: Mild-Moderate (flat cheeks, minimal bounce)  Triceps: Mild-moderate (less than ample fat tissue)  Muscle Wasting:  Temporalis: Mild-Moderate (slight depression)  Pectoralis (Clavicular Region): Mild-Moderate (some protrusion of clavicle)  Deltoid/Trapezius: Mild-Moderate (slight protrusion of acromion process)  Interosseous: Mild-Moderate (slightly depressed area between thumb and forefinger)  Edema:  Edema: none  Physical Findings:  Nails: Positive (brittle/broken)  Skin: Positive (stage 1 PI ischium, stage 3 PI sacrum per surgeon note)    Nutrition Significant Labs:    Reviewed   Nutrition Specific Medications:  Reviewed     I/O:   Last BM Date: 01/16/24; Stool Appearance: Soft (01/17/24 0600)        Dietary Orders (From admission, onward)       Start     Ordered    01/17/24 1025  Oral nutritional supplements  Until discontinued        Comments: chocolate   Question Answer Comment   Deliver with All meals    Select supplement: Ensure Plus High Protein        01/17/24 1024    01/16/24 1601  Adult diet Regular; Pureed 4; Thin 0  Diet effective now        Question Answer Comment   Diet type Regular    Texture Pureed 4    Fluid consistency Thin 0        01/16/24 1600                     Estimated Needs:   Total Energy Estimated Needs (kCal): 1400 kCal  Method for Estimating Needs: 30 kcal/kg ABW  Total Protein Estimated Needs (g): 56 g  Method for Estimating Needs: 1.2 g/kg ABW  Total Fluid Estimated Needs (mL): 1400 mL  Method for Estimating Needs: 30 ml/kg ABW        Nutrition Diagnosis   Malnutrition Diagnosis  Patient has Malnutrition Diagnosis: Yes  Diagnosis Status: New  Malnutrition Diagnosis: Moderate malnutrition related to chronic disease or condition  As Evidenced by: mild-moderate fat wasting; mild-moderate muscle wasting            Nutrition Interventions/Recommendations         Nutrition Prescription:  Individualized  Nutrition Prescription Provided for : Regular diet, texture per SLP + ONS        Nutrition Interventions:   Food and/or Nutrient Delivery Interventions  Interventions: Meals and snacks, Medical food supplement  Meals and Snacks: Texture-modified diet  Goal: Consumes 3 meals per day  Medical Food Supplement: Commercial beverage  Goal: Ensure Plus High Protein TID (provides 350 kcal, 20 g protein per serving) chocolate    Coordination of Nutrition Care by a Nutrition Professional  Collaboration and Referral of Nutrition Care: Collaboration by nutrition professional with other providers  Goal: Spoke with nurse    Nutrition Education:   Not appropriate       Nutrition Monitoring and Evaluation   Food/Nutrient Related History Monitoring  Monitoring and Evaluation Plan: Energy intake, Amount of food, Fluid intake  Energy Intake: Estimated energy intake  Criteria: Pt meets >75% of estimated energy needs  Fluid Intake: Estimated fluid intake  Criteria: fluid intake to meet >75% of estimated need  Amount of Food: Estimated amout of food, Medical food intake  Criteria: Pt consumes >75% of meals and supplements    Body Composition/Growth/Weight History  Monitoring and Evaluation Plan: Weight  Weight: Measured weight  Criteria: Maintains stable weight/gradual wt gain    Biochemical Data, Medical Tests and Procedures  Monitoring and Evaluation Plan: Electrolyte/renal panel  Electrolyte and Renal Panel: Potassium  Criteria: Electrolytes WNL    Nutrition Focused Physical Findings  Monitoring and Evaluation Plan: Skin  Skin: Impaired wound healing  Criteria: Promote wound healing through adequate nutrition       Time Spent/Follow-up Reminder:   Time Spent (min): 45 minutes  Last Date of Nutrition Visit: 01/17/24  Nutrition Follow-Up Needed?: 3-5 days  Follow up Comment: 1/22 KATIE

## 2024-01-17 NOTE — PROGRESS NOTES
Per conversation with provider patient is anticipated to discharge with IV ABX. Details TBD. Provider recommended SNF for IV ABX needs and rehab. Call placed to patients spouse who was agreeable to SNF. He reports patent has been to [facility] in the past and would like her to return there for her ATB needs. Message sent to [facility] team for referral managemnt. Once we get IV ABX details we will plan to start pre-cert. Care Transitions will continue to follow during course of hospital stay for any changes to discharge needs.  Tika Sorensen RN

## 2024-01-17 NOTE — PROGRESS NOTES
"Irina Stratton is a 73 y.o. female on day 1 of admission presenting with Sacral osteomyelitis (CMS/HCC).    Subjective   Patient resting comfortably in bed. No overnight events.        Objective     Physical Exam  Constitutional:       General: She is not in acute distress.  Cardiovascular:      Rate and Rhythm: Normal rate.   Pulmonary:      Effort: Pulmonary effort is normal.   Abdominal:      Palpations: Abdomen is soft.   Skin:     General: Skin is warm and dry.      Comments: Sacral wound draining thick tan purulent fluid with surrounding erythema   Neurological:      General: No focal deficit present.         Last Recorded Vitals  Blood pressure 152/68, pulse 75, temperature 36.6 °C (97.9 °F), temperature source Temporal, resp. rate 18, height 1.651 m (5' 5\"), weight 47.6 kg (104 lb 15 oz), SpO2 93 %.  Intake/Output last 3 Shifts:  I/O last 3 completed shifts:  In: 2801.7 (58.9 mL/kg) [I.V.:1001.7 (21 mL/kg); IV Piggyback:1800]  Out: 1150 (24.2 mL/kg) [Urine:1150 (0.7 mL/kg/hr)]  Dosing Weight: 47.6 kg     Relevant Results              Results for orders placed or performed during the hospital encounter of 01/16/24 (from the past 24 hour(s))   POCT GLUCOSE   Result Value Ref Range    POCT Glucose 127 (H) 74 - 99 mg/dL   POCT GLUCOSE   Result Value Ref Range    POCT Glucose 87 74 - 99 mg/dL   POCT GLUCOSE   Result Value Ref Range    POCT Glucose 77 74 - 99 mg/dL   POCT GLUCOSE   Result Value Ref Range    POCT Glucose 111 (H) 74 - 99 mg/dL   Comprehensive Metabolic Panel   Result Value Ref Range    Glucose 104 (H) 74 - 99 mg/dL    Sodium 145 136 - 145 mmol/L    Potassium 3.2 (L) 3.5 - 5.3 mmol/L    Chloride 115 (H) 98 - 107 mmol/L    Bicarbonate 22 21 - 32 mmol/L    Anion Gap 11 10 - 20 mmol/L    Urea Nitrogen 22 6 - 23 mg/dL    Creatinine 0.66 0.50 - 1.05 mg/dL    eGFR >90 >60 mL/min/1.73m*2    Calcium 8.0 (L) 8.6 - 10.3 mg/dL    Albumin 2.9 (L) 3.4 - 5.0 g/dL    Alkaline Phosphatase 76 33 - 136 U/L    Total " Protein 6.9 6.4 - 8.2 g/dL     (H) 9 - 39 U/L    Bilirubin, Total 0.7 0.0 - 1.2 mg/dL    ALT 38 7 - 45 U/L   CBC   Result Value Ref Range    WBC 8.8 4.4 - 11.3 x10*3/uL    nRBC 0.0 0.0 - 0.0 /100 WBCs    RBC 3.52 (L) 4.00 - 5.20 x10*6/uL    Hemoglobin 9.3 (L) 12.0 - 16.0 g/dL    Hematocrit 32.8 (L) 36.0 - 46.0 %    MCV 93 80 - 100 fL    MCH 26.4 26.0 - 34.0 pg    MCHC 28.4 (L) 32.0 - 36.0 g/dL    RDW 19.1 (H) 11.5 - 14.5 %    Platelets 251 150 - 450 x10*3/uL   POCT GLUCOSE   Result Value Ref Range    POCT Glucose 99 74 - 99 mg/dL   POCT GLUCOSE   Result Value Ref Range    POCT Glucose 118 (H) 74 - 99 mg/dL                       Assessment/Plan   Principal Problem:    Sacral osteomyelitis (CMS/HCC)    73 year old female with a past medical history significant for Parkinson's, CKD, hypothyroidism, Afib on Xarelto, NIDDM Type 2, HTN, HLD, PVD, AAA s/p Aorto bi-iliac stent graft repair, CAD, anemia, and depression presented to [facility] ED on 1/16 from home with concerns of sacral wound infection.      Impression:  Sacral decubitus pressure ulcer (Stage IV) with infection and osteomyelitis    > recent wound culture 1/3 with MSSA  Left ischial pressure ulcer (Appears stage I, may have been healed deeper stage in past)  Likely UTI  Leukocytosis 2/2 above  Hypernatremia      Recommendations:  - no surgical debridement is indicated. Despite infection, tunneled wound itself appears to have healthy tissue underlying. Would not recommend any further debridement or wound opening  - follow up blood and urine culture results   - antibiotic management per primary   - consult wound care RN for local wound recommendations   > for now, daily dressing changes with Calcium alginate and Mepilex over sacrum  - turning/repositioning q2h  - low air-loss mattress  - okay for home meds including Xarelto      Surgery will sign off, please call with questions. Patient seen and discussed with Dr. Mccain       I spent 15 minutes in the  professional and overall care of this patient.      Natalie Guallpa, APRN-CNP

## 2024-01-17 NOTE — CARE PLAN
Problem: Skin  Goal: Decreased wound size/increased tissue granulation at next dressing change  Outcome: Progressing  Flowsheets (Taken 1/17/2024 0007)  Decreased wound size/increased tissue granulation at next dressing change:   Promote sleep for wound healing   Protective dressings over bony prominences  Goal: Participates in plan/prevention/treatment measures  Outcome: Progressing  Flowsheets (Taken 1/17/2024 0007)  Participates in plan/prevention/treatment measures: Elevate heels  Goal: Prevent/manage excess moisture  Outcome: Progressing  Flowsheets (Taken 1/17/2024 0007)  Prevent/manage excess moisture: Moisturize dry skin  Goal: Prevent/minimize sheer/friction injuries  Outcome: Progressing  Flowsheets (Taken 1/17/2024 0007)  Prevent/minimize sheer/friction injuries:   HOB 30 degrees or less   Turn/reposition every 2 hours/use positioning/transfer devices  Goal: Promote/optimize nutrition  Outcome: Progressing  Flowsheets (Taken 1/17/2024 0007)  Promote/optimize nutrition: Consume > 50% meals/supplements  Goal: Promote skin healing  Outcome: Progressing  Flowsheets (Taken 1/17/2024 0007)  Promote skin healing: Turn/reposition every 2 hours/use positioning/transfer devices   The patient's goals for the shift include      The clinical goals for the shift include Pt. safety    Over the shift, the patient did make progress toward the following goals. Barriers to progression include contracted legs and BLE weakness . Recommendations to address these barriers include q 2 hours turn .

## 2024-01-17 NOTE — PROGRESS NOTES
"Vancomycin Dosing by Pharmacy- INITIAL    Irina Stratton is a 73 y.o. year old female who Pharmacy has been consulted for vancomycin dosing for cellulitis, skin and soft tissue. Based on the patient's indication and renal status this patient will be dosed based on a goal AUC of 400-600.     Renal function is currently stable.    Visit Vitals  /68 (BP Location: Left arm, Patient Position: Lying)   Pulse 75   Temp 36.6 °C (97.9 °F) (Temporal)   Resp 18        Lab Results   Component Value Date    CREATININE 0.66 01/17/2024    CREATININE 0.96 01/16/2024    CREATININE 0.76 07/24/2023    CREATININE 0.93 07/10/2023    CREATININE 0.96 07/08/2023    CREATININE 0.75 07/05/2023        Patient weight is No results found for: \"PTWEIGHT\"    No results found for: \"CULTURE\"     I/O last 3 completed shifts:  In: 2801.7 (58.9 mL/kg) [I.V.:1001.7 (21 mL/kg); IV Piggyback:1800]  Out: 1150 (24.2 mL/kg) [Urine:1150 (0.7 mL/kg/hr)]  Dosing Weight: 47.6 kg   [unfilled]    Lab Results   Component Value Date    PATIENTTEMP 37.0 01/16/2024          Assessment/Plan     Patient will not be given a loading dose.  Will initiate vancomycin maintenance,  1250 mg every 24 hours.    This dosing regimen is predicted by [employer] to result in the following pharmacokinetic parameters:  Loading dose: N/A  Regimen: 1250 mg IV every 24 hours.  Start time: 11:20 on 01/17/2024  Exposure target: AUC24 (range)400-600 mg/L.hr   AUC24,ss: 487 mg/L.hr  Probability of AUC24 > 400: 72 %  Ctrough,ss: 12.9 mg/L  Probability of Ctrough,ss > 20: 17 %  Probability of nephrotoxicity (Lodise MATILDA 2009): 8 %    Follow-up level will be ordered on 1/18 at 0600 unless clinically indicated sooner.  Will continue to monitor renal function daily while on vancomycin and order serum creatinine at least every 48 hours if not already ordered.  Follow for continued vancomycin needs, clinical response, and signs/symptoms of toxicity.       [facility]       "

## 2024-01-17 NOTE — PROGRESS NOTES
Irina Stratton is a 73 y.o. female on day 1 of admission presenting with Sacral osteomyelitis (CMS/HCC).      Subjective   Patient is a 73 year old female on day 1 of admission presenting with sacral osteomyelitis. Patient has a history of parkinson's, CKD, hypothyroidism, atrial fibrillation, diabetes, hypertension, hyperlipidemia, depression, MI, and cholelithiasis.        Objective     Last Recorded Vitals  /73   Pulse 77   Temp 37.3 °C (99.1 °F)   Resp 18   Wt 47.6 kg (104 lb 15 oz)   SpO2 90%   Intake/Output last 3 Shifts:    Intake/Output Summary (Last 24 hours) at 1/17/2024 1555  Last data filed at 1/17/2024 1451  Gross per 24 hour   Intake 1301.67 ml   Output 1650 ml   Net -348.33 ml       Admission Weight  Weight: 47.6 kg (104 lb 15 oz) (01/16/24 0121)    Daily Weight  01/16/24 : 47.6 kg (104 lb 15 oz)    Image Results  CT abdomen pelvis wo IV contrast  Narrative: Interpreted By:  Finkelstein, Evan,   STUDY:  CT ABDOMEN PELVIS WO IV CONTRAST;  1/16/2024 1:28 am      INDICATION:  Signs/Symptoms:sacral osteomyelitis.      COMPARISON:  CT abdomen pelvis 06/29/2023      ACCESSION NUMBER(S):  [id number]      ORDERING CLINICIAN:  FILIBERTO MOSELEY      TECHNIQUE:  Axial noncontrast CT images of the abdomen and pelvis with coronal  and sagittal reconstructed images.      FINDINGS:  LOWER CHEST: Dependent atelectasis. There are coronary artery  calcifications.      ABDOMEN:  Lack of intravenous contrast limits evaluation of vessels and solid  organs. LIVER: Normal attenuation and contour.  BILE DUCTS: Normal caliber.  GALLBLADDER: No calcified gallstones. No wall thickening.  SPLEEN: Unremarkable.  PANCREAS: Unremarkable.  ADRENALS: Unremarkable.      KIDNEYS, URETERS, URINARY BLADDER: No hydronephrosis or urinary tract  calculus. Hyperdense lesions in the kidneys bilaterally are favored  to represent proteinaceous or hemorrhagic cysts. Additional hypodense  lesions measure simple fluid attenuation and  are most compatible with  simple cysts. The urinary bladder is decompressed with a Rain  catheter. REPRODUCTIVE ORGANS: No abnormality, given limitations of  the noncontrast CT.  No significant free pelvic fluid.      ABDOMINAL WALL: Within normal limits.  PERITONEUM: No ascites or free air.      BOWEL: The stomach is decompressed, which limits evaluation. No small  or large bowel dilatation. Large colonic stool burden. There is  rectal wall thickening.      VESSELS: Aorto bi-iliac stent graft repair of an aortic aneurysm  measuring up to approximately 4.6 cm. RETROPERITONEUM: No  pathologically enlarged lymph nodes.      BONES: There is soft tissue thickening overlying the mid to lower  sacrum. There are erosive changes involving the underlying sacrum  with adjacent punctate lucencies likely related to gas/infection. No      Impression: Soft tissue thickening overlying the mid to lower sacrum with  underlying erosive changes and adjacent punctate lucencies. Findings  suggest an age-indeterminate soft tissue infection along with  associated osteomyelitis.      Large colonic stool burden and associated rectal wall thickening.  Findings may represent a component of fecal impaction. Stercoral  colitis is also not excluded in the appropriate clinical setting.      Aorto bi-iliac stent graft repair of an infrarenal aortic aneurysm  measuring up to 4.6 cm in maximum AP dimension.      MACRO:  None.      Signed by: Evan Finkelstein 1/16/2024 1:56 AM  Dictation workstation:   [id number]      Physical Exam  Constitutional:       General: She is awake.      Appearance: Normal appearance.   HENT:      Head: Normocephalic and atraumatic.   Cardiovascular:      Rate and Rhythm: Normal rate and regular rhythm.      Heart sounds: Normal heart sounds.   Pulmonary:      Effort: Pulmonary effort is normal.      Breath sounds: Normal breath sounds and air entry.   Abdominal:      General: Bowel sounds are normal.      Palpations:  Abdomen is soft.   Neurological:      Mental Status: She is alert.   Psychiatric:         Behavior: Behavior is cooperative.         Relevant Results      Scheduled medications  calcitriol, 0.25 mcg, oral, Once per day on Mon Wed Fri  carbidopa-levodopa, 1 tablet, oral, TID  cholecalciferol, 1,000 Units, oral, Daily  DULoxetine, 30 mg, oral, Daily  gabapentin, 100 mg, oral, TID  hydrocortisone, , Topical, BID  insulin lispro, 0-10 Units, subcutaneous, q4h  levothyroxine, 88 mcg, oral, Daily  metoprolol tartrate, 25 mg, oral, BID  mupirocin, , Topical, BID  piperacillin-tazobactam, 3.375 g, intravenous, q6h  polyethylene glycol, 17 g, oral, Daily  rivaroxaban, 20 mg, oral, Daily  rosuvastatin, 40 mg, oral, Daily  sodium hypochlorite, , irrigation, BID  tamsulosin, 0.4 mg, oral, Daily  vancomycin, 1,250 mg, intravenous, q24h      Continuous medications     PRN medications  PRN medications: acetaminophen, dextrose 10 % in water (D10W), dextrose, glucagon, oxyCODONE-acetaminophen  Results for orders placed or performed during the hospital encounter of 01/16/24 (from the past 24 hour(s))   POCT GLUCOSE   Result Value Ref Range    POCT Glucose 127 (H) 74 - 99 mg/dL   POCT GLUCOSE   Result Value Ref Range    POCT Glucose 87 74 - 99 mg/dL   POCT GLUCOSE   Result Value Ref Range    POCT Glucose 77 74 - 99 mg/dL   POCT GLUCOSE   Result Value Ref Range    POCT Glucose 111 (H) 74 - 99 mg/dL   Comprehensive Metabolic Panel   Result Value Ref Range    Glucose 104 (H) 74 - 99 mg/dL    Sodium 145 136 - 145 mmol/L    Potassium 3.2 (L) 3.5 - 5.3 mmol/L    Chloride 115 (H) 98 - 107 mmol/L    Bicarbonate 22 21 - 32 mmol/L    Anion Gap 11 10 - 20 mmol/L    Urea Nitrogen 22 6 - 23 mg/dL    Creatinine 0.66 0.50 - 1.05 mg/dL    eGFR >90 >60 mL/min/1.73m*2    Calcium 8.0 (L) 8.6 - 10.3 mg/dL    Albumin 2.9 (L) 3.4 - 5.0 g/dL    Alkaline Phosphatase 76 33 - 136 U/L    Total Protein 6.9 6.4 - 8.2 g/dL     (H) 9 - 39 U/L    Bilirubin,  Total 0.7 0.0 - 1.2 mg/dL    ALT 38 7 - 45 U/L   CBC   Result Value Ref Range    WBC 8.8 4.4 - 11.3 x10*3/uL    nRBC 0.0 0.0 - 0.0 /100 WBCs    RBC 3.52 (L) 4.00 - 5.20 x10*6/uL    Hemoglobin 9.3 (L) 12.0 - 16.0 g/dL    Hematocrit 32.8 (L) 36.0 - 46.0 %    MCV 93 80 - 100 fL    MCH 26.4 26.0 - 34.0 pg    MCHC 28.4 (L) 32.0 - 36.0 g/dL    RDW 19.1 (H) 11.5 - 14.5 %    Platelets 251 150 - 450 x10*3/uL   POCT GLUCOSE   Result Value Ref Range    POCT Glucose 99 74 - 99 mg/dL   POCT GLUCOSE   Result Value Ref Range    POCT Glucose 118 (H) 74 - 99 mg/dL   POCT GLUCOSE   Result Value Ref Range    POCT Glucose 110 (H) 74 - 99 mg/dL              Assessment/Plan            Principal Problem:    Sacral osteomyelitis (CMS/HCC)    Patient was fully evaluated on January 17, 2024. Blood cultures were negative. Will recheck labs in AM.       Malnutrition Diagnosis Status: New  Malnutrition Diagnosis: Moderate malnutrition related to chronic disease or condition  As Evidenced by: mild-moderate fat wasting; mild-moderate muscle wasting  I agree with the dietitian's malnutrition diagnosis.         Shelbie Osorio

## 2024-01-17 NOTE — NURSING NOTE
1920 Assumed patient care. Patient is currently laying in bed watching tv. Patients respirations even and unlabored. No signs of distress. Bed locked in lowest position and call light within reach, and bed alarm on.     2000 sacrum dressing changed per order. Pt tolerated well, no signs of pain.  Wound care supplies at bedside.

## 2024-01-18 NOTE — CARE PLAN
The patient's goals for the shift include      The clinical goals for the shift include Maintain comfort

## 2024-01-18 NOTE — PROGRESS NOTES
Irina Stratton is a 73 y.o. female on day 2 of admission presenting with Sacral osteomyelitis (CMS/HCC).      Subjective   Patient is a 73 year old female on day 1 of admission presenting with sacral osteomyelitis. Patient has a history of parkinson's, CKD, hypothyroidism, atrial fibrillation, diabetes, hypertension, hyperlipidemia, depression, MI, and cholelithiasis.        Objective     Last Recorded Vitals  /70 (BP Location: Left arm, Patient Position: Lying)   Pulse 61   Temp 36.5 °C (97.7 °F) (Temporal)   Resp 18   Wt 47.6 kg (104 lb 15 oz)   SpO2 95%   Intake/Output last 3 Shifts:    Intake/Output Summary (Last 24 hours) at 1/18/2024 1431  Last data filed at 1/18/2024 1212  Gross per 24 hour   Intake 810 ml   Output 800 ml   Net 10 ml         Admission Weight  Weight: 47.6 kg (104 lb 15 oz) (01/16/24 0121)    Daily Weight  01/16/24 : 47.6 kg (104 lb 15 oz)    Image Results  CT abdomen pelvis wo IV contrast  Narrative: Interpreted By:  Finkelstein, Evan,   STUDY:  CT ABDOMEN PELVIS WO IV CONTRAST;  1/16/2024 1:28 am      INDICATION:  Signs/Symptoms:sacral osteomyelitis.      COMPARISON:  CT abdomen pelvis 06/29/2023      ACCESSION NUMBER(S):  [id number]      ORDERING CLINICIAN:  FILIBERTO MOSELEY      TECHNIQUE:  Axial noncontrast CT images of the abdomen and pelvis with coronal  and sagittal reconstructed images.      FINDINGS:  LOWER CHEST: Dependent atelectasis. There are coronary artery  calcifications.      ABDOMEN:  Lack of intravenous contrast limits evaluation of vessels and solid  organs. LIVER: Normal attenuation and contour.  BILE DUCTS: Normal caliber.  GALLBLADDER: No calcified gallstones. No wall thickening.  SPLEEN: Unremarkable.  PANCREAS: Unremarkable.  ADRENALS: Unremarkable.      KIDNEYS, URETERS, URINARY BLADDER: No hydronephrosis or urinary tract  calculus. Hyperdense lesions in the kidneys bilaterally are favored  to represent proteinaceous or hemorrhagic cysts. Additional  hypodense  lesions measure simple fluid attenuation and are most compatible with  simple cysts. The urinary bladder is decompressed with a Rain  catheter. REPRODUCTIVE ORGANS: No abnormality, given limitations of  the noncontrast CT.  No significant free pelvic fluid.      ABDOMINAL WALL: Within normal limits.  PERITONEUM: No ascites or free air.      BOWEL: The stomach is decompressed, which limits evaluation. No small  or large bowel dilatation. Large colonic stool burden. There is  rectal wall thickening.      VESSELS: Aorto bi-iliac stent graft repair of an aortic aneurysm  measuring up to approximately 4.6 cm. RETROPERITONEUM: No  pathologically enlarged lymph nodes.      BONES: There is soft tissue thickening overlying the mid to lower  sacrum. There are erosive changes involving the underlying sacrum  with adjacent punctate lucencies likely related to gas/infection. No      Impression: Soft tissue thickening overlying the mid to lower sacrum with  underlying erosive changes and adjacent punctate lucencies. Findings  suggest an age-indeterminate soft tissue infection along with  associated osteomyelitis.      Large colonic stool burden and associated rectal wall thickening.  Findings may represent a component of fecal impaction. Stercoral  colitis is also not excluded in the appropriate clinical setting.      Aorto bi-iliac stent graft repair of an infrarenal aortic aneurysm  measuring up to 4.6 cm in maximum AP dimension.      MACRO:  None.      Signed by: Evan Finkelstein 1/16/2024 1:56 AM  Dictation workstation:   [id number]      Physical Exam  Constitutional:       General: She is awake.      Appearance: Normal appearance.   HENT:      Head: Normocephalic and atraumatic.   Cardiovascular:      Rate and Rhythm: Normal rate and regular rhythm.      Heart sounds: Normal heart sounds.   Pulmonary:      Effort: Pulmonary effort is normal.      Breath sounds: Normal breath sounds and air entry.   Abdominal:       General: Bowel sounds are normal.      Palpations: Abdomen is soft.   Neurological:      Mental Status: She is alert.   Psychiatric:         Behavior: Behavior is cooperative.         Relevant Results      Scheduled medications  calcitriol, 0.25 mcg, oral, Once per day on Mon Wed Fri  carbidopa-levodopa, 1 tablet, oral, TID  cholecalciferol, 1,000 Units, oral, Daily  DULoxetine, 30 mg, oral, Daily  gabapentin, 100 mg, oral, TID  hydrocortisone, , Topical, BID  insulin lispro, 0-10 Units, subcutaneous, q4h  levothyroxine, 88 mcg, oral, Daily  metoprolol tartrate, 25 mg, oral, BID  mupirocin, , Topical, BID  piperacillin-tazobactam, 3.375 g, intravenous, q6h  polyethylene glycol, 17 g, oral, Daily  rivaroxaban, 20 mg, oral, Daily  rosuvastatin, 40 mg, oral, Daily  sodium hypochlorite, , irrigation, BID  tamsulosin, 0.4 mg, oral, Daily  vancomycin, 1,250 mg, intravenous, q24h      Continuous medications     PRN medications  PRN medications: acetaminophen, dextrose 10 % in water (D10W), dextrose, glucagon, oxyCODONE-acetaminophen  Results for orders placed or performed during the hospital encounter of 01/16/24 (from the past 24 hour(s))   POCT GLUCOSE   Result Value Ref Range    POCT Glucose 110 (H) 74 - 99 mg/dL   POCT GLUCOSE   Result Value Ref Range    POCT Glucose 78 74 - 99 mg/dL   POCT GLUCOSE   Result Value Ref Range    POCT Glucose 107 (H) 74 - 99 mg/dL   Vancomycin, Trough   Result Value Ref Range    Vancomycin, Trough 12.4 5.0 - 20.0 ug/mL   Basic metabolic panel   Result Value Ref Range    Glucose 79 74 - 99 mg/dL    Sodium 146 (H) 136 - 145 mmol/L    Potassium 3.2 (L) 3.5 - 5.3 mmol/L    Chloride 115 (H) 98 - 107 mmol/L    Bicarbonate 22 21 - 32 mmol/L    Anion Gap 12 10 - 20 mmol/L    Urea Nitrogen 20 6 - 23 mg/dL    Creatinine 0.67 0.50 - 1.05 mg/dL    eGFR >90 >60 mL/min/1.73m*2    Calcium 8.4 (L) 8.6 - 10.3 mg/dL   POCT GLUCOSE   Result Value Ref Range    POCT Glucose 92 74 - 99 mg/dL   Lavender Top    Result Value Ref Range    Extra Tube Hold for add-ons.    POCT GLUCOSE   Result Value Ref Range    POCT Glucose 124 (H) 74 - 99 mg/dL              Assessment/Plan            Principal Problem:    Sacral osteomyelitis (CMS/HCC)    Patient was fully evaluated on January 17, 2024. Blood cultures were negative. Will recheck labs in AM.     Patient fully evaluated on January 18, 2024. Blood cultures were negative. Patient stated she was doing good and had no acute concerns. Will recheck labs in AM.       Malnutrition Diagnosis Status: New  Malnutrition Diagnosis: Moderate malnutrition related to chronic disease or condition  As Evidenced by: mild-moderate fat wasting; mild-moderate muscle wasting  I agree with the dietitian's malnutrition diagnosis.         Shelbie Osorio

## 2024-01-18 NOTE — PROGRESS NOTES
Speech-Language Pathology    Inpatient  Speech-Language Pathology Treatment     Patient Name: Irina Stratton  MRN: [id number]  Today's Date: 1/18/2024  Time Calculation  Start Time: 1120  Stop Time: 1145  Time Calculation (min): 25 min     Oral Dysphagia with oral prolonged prep skills ongoing across textured Soft solids c/w dementia.     Current Problem:   1. Sacral osteomyelitis (CMS/[facility])        2. Urinary tract infection associated with indwelling urethral catheter, initial encounter (CMS/[facility])        3. Sepsis, due to unspecified organism, unspecified whether acute organ dysfunction present (CMS/[facility])              SLP Assessment:  Prognosis: Fair    Pt alert and confused but p/w WFL Vocal quality and is receptive to Purees without issue or complaints per RN Camila.   Pt fed 5 ounces of yogurt via tsp larger boluses and 5 ounces of juice via straw with improved functional oral skills and good PO acceptance.   Minimal prolonged mashing -chewing pattern exhibited across jacque crackers given to pt x3 trials. Vertical chewing pattern noted most likely d/t incomplete full dentition in poor condition and Cognitive deficits.   Rec a dietary  consult and or oral supplements for additional nutrition as RN states pt continues with low oral intake.   No clinical s/sx of aspiration .   Continue the Purees and thins with total feed A.        Plan:  Inpatient/Swing Bed or Outpatient: Inpatient  SLP Frequency: 2x per week  Duration: 1 week  SLP Discharge Recommendations: Skilled nursing facility placement  Discussed POC: Caregiver/family  Patient/Caregiver Agreeable: Yes      Dysphagia goals :  Pt will tolerate the prescribed diet ( Puree/thins ) with improved oral intake and no s/s of aspiration. GOAL MET 1/18/24  Pt 's family will adhere to safe swallowing strategies during PO intake with > 90 % accuracy independently. GOAL ONGOING   Pt will participate in PO trials of soft  textured solids with adequate oral phase of the  swallow in > 90  % of trials for a possible diet upgrade. GOAL ONGOING        Subjective   Alert, receptive to PO fed to her.      Most Recent Visit:  SLP Received On: 01/18/24    General Visit Information:      Pt on RA with WBC 8.8 and p/w  stable respiratory skills    Pain Assessment:   Pain Assessment: 0-10  Pain Score: 0 - No pain      Objective       Therapeutic Swallow:  Liquid Diet Recommendations: Thin (IDDSI Level 0)  Purees and Soft textured PO trials given.     Inpatient:  Education Documentation    RN aware of good PO tolerance of the current oral modified diet of Purees/thins.        Consultations/Referrals/Coordination of Services: Nutrition consult

## 2024-01-18 NOTE — CARE PLAN
The patient's goals for the shift include      The clinical goals for the shift include comfort      Problem: Skin  Goal: Decreased wound size/increased tissue granulation at next dressing change  Outcome: Progressing  Flowsheets (Taken 1/17/2024 0007 by Lakisha Blum RN)  Decreased wound size/increased tissue granulation at next dressing change:   Promote sleep for wound healing   Protective dressings over bony prominences  Goal: Participates in plan/prevention/treatment measures  Outcome: Progressing  Flowsheets (Taken 1/17/2024 0007 by Lakisha Blum RN)  Participates in plan/prevention/treatment measures: Elevate heels  Goal: Prevent/manage excess moisture  Outcome: Progressing  Flowsheets (Taken 1/17/2024 0007 by Lakisha Blum RN)  Prevent/manage excess moisture: Moisturize dry skin  Goal: Prevent/minimize sheer/friction injuries  Outcome: Progressing  Flowsheets (Taken 1/18/2024 0234)  Prevent/minimize sheer/friction injuries:   HOB 30 degrees or less   Turn/reposition every 2 hours/use positioning/transfer devices  Goal: Promote/optimize nutrition  Outcome: Progressing  Flowsheets (Taken 1/17/2024 1043 by Fabiola Oscar RN)  Promote/optimize nutrition: Assist with feeding  Goal: Promote skin healing  Outcome: Progressing  Flowsheets (Taken 1/17/2024 0007 by Lakisha Blum RN)  Promote skin healing: Turn/reposition every 2 hours/use positioning/transfer devices     Problem: Pain  Goal: My pain/discomfort is manageable  Outcome: Progressing     Problem: Safety  Goal: Patient will be injury free during hospitalization  Outcome: Progressing  Goal: I will remain free of falls  Outcome: Progressing     Problem: Daily Care  Goal: Daily care needs are met  Outcome: Progressing     Problem: Psychosocial Needs  Goal: Demonstrates ability to cope with hospitalization/illness  Outcome: Progressing  Goal: Collaborate with me, my family, and caregiver to identify my specific goals  Outcome: Progressing     Problem: Discharge  Barriers  Goal: My discharge needs are met  Outcome: Progressing

## 2024-01-18 NOTE — PROGRESS NOTES
"Vancomycin Dosing by Pharmacy- FOLLOW UP    Irina Stratton is a 73 y.o. year old female who Pharmacy has been consulted for vancomycin dosing for cellulitis, skin and soft tissue. Based on the patient's indication and renal status this patient is being dosed based on a goal AUC of 400-600.     Renal function is currently stable.    Current vancomycin dose: 1250 mg given every 24 hours    Estimated vancomycin AUC on current dose: 521 mg/L.hr     Visit Vitals  /70 (BP Location: Left arm, Patient Position: Lying)   Pulse 61   Temp 36.5 °C (97.7 °F) (Temporal)   Resp 18        Lab Results   Component Value Date    CREATININE 0.67 01/18/2024    CREATININE 0.66 01/17/2024    CREATININE 0.96 01/16/2024    CREATININE 0.76 07/24/2023    CREATININE 0.93 07/10/2023    CREATININE 0.96 07/08/2023    CREATININE 0.75 07/05/2023        Patient weight is No results found for: \"PTWEIGHT\"    No results found for: \"CULTURE\"     I/O last 3 completed shifts:  In: 731.7 (15.4 mL/kg) [I.V.:331.7 (7 mL/kg); IV Piggyback:400]  Out: 1650 (34.7 mL/kg) [Urine:1650 (1 mL/kg/hr)]  Dosing Weight: 47.6 kg   [unfilled]    Lab Results   Component Value Date    PATIENTTEMP 37.0 01/16/2024        Assessment/Plan    Within goal AUC range. Continue current vancomycin regimen.    This dosing regimen is predicted by [employer] to result in the following pharmacokinetic parameters:  Regimen: 1250 mg IV every 24 hours.  Exposure target: AUC24 (range)400-600 mg/L.hr   AUC24,ss: 521 mg/L.hr  Probability of AUC24 > 400: 91 %  Ctrough,ss: 14 mg/L  Probability of Ctrough,ss > 20: 11 %  Probability of nephrotoxicity (Lodise MATILDA 2009): 9     The next level will be obtained on 1/21/24 with morning labs. May be obtained sooner if clinically indicated.   Will continue to monitor renal function daily while on vancomycin and order serum creatinine at least every 48 hours if not already ordered.  Follow for continued vancomycin needs, clinical response, and " signs/symptoms of toxicity.     Bharti Palafox, PharmD, BCPS   1/18/2024 2:17 PM

## 2024-01-19 NOTE — NURSING NOTE
Hip and Sacral wound irrigated, creams applied, wound packed, and dressing reapplied per orders. Patient tolerated well and without complaint.

## 2024-01-19 NOTE — PROGRESS NOTES
Speech-Language Pathology    Inpatient  Speech-Language Pathology Treatment     Patient Name: Irina Stratton  MRN: [id number]  Today's Date: 1/19/2024  Time Calculation  Start Time: 1035  Stop Time: 1055  Time Calculation (min): 20 min       Oral Dysphagia d/t reduced Cognition c/w dementia.   Current Problem:   1. Sacral osteomyelitis (CMS/[facility])  mupirocin (Bactroban) 2 % ointment    polyethylene glycol (Glycolax, Miralax) 17 gram packet    sodium hypochlorite (Dakin's Full-Strength) 0.5 % external solution    vancomycin 1.25 g in dextrose 5 % 5 % 250 mL IVPB      2. Urinary tract infection associated with indwelling urethral catheter, initial encounter (CMS/[facility])        3. Sepsis, due to unspecified organism, unspecified whether acute organ dysfunction present (CMS/[facility])              SLP Assessment:  SLP TX Intervention Outcome: Making Progress Towards Goals  Prognosis: Fair  Treatment Provided: Yes with pt ,  educated on upgraded diet of MM5/thins   Treatment Tolerance: Patient tolerated treatment well  Medical Staff Made Aware: Yes  Strengths: Family/Caregiver Suppport  Barriers: Cognition  Education Provided: Yes       Plan:  Inpatient/Swing Bed or Outpatient: Inpatient  Treatment/Interventions: Other (Comment) (Dysphagia therapy)  SLP TX Plan: Discharge from Speech Therapy  SLP Discharge Recommendations: Skilled nursing facility placement  Discussed POC: Caregiver/family  Patient/Caregiver Agreeable: Yes    Dysphagia goals :  Pt will tolerate the prescribed diet ( MM/thins  ) with improved oral intake and no s/s of aspiration. GOAL MET 1/19/24  Pt 's family,caregivers  will adhere to safe swallowing strategies during PO intake with > 90 % accuracy independently. GOAL Met with educational handout provided to the pt's caregiver today for the IDDSI oral diet of Minced Moist solids.   Pt will participate in PO trials of soft  textured solids with adequate oral phase of the swallow in > 90  % of trials for a  "possible diet upgrade. GOAL Met 1/19/24 with oral diet now MM/thins .  in agreement with ongoing modified diet.            Subjective   Current Problem:  Alert, confused , needs feed d/t her poor Cognition.   Discharge plan [place]. d/t pt needs IV antibiotics     Most Recent Visit:  SLP Received On: 01/19/24    General Visit Information:      Pt on RA, Vocal Quality clear.   Pt receptive to PO . When asked if likes the puree diet? Pt states, \" the food is great.\"     Pain Assessment:   Pain Assessment: 0-10  Pain Score: 0 - No pain      Objective       Therapeutic Swallow:  Therapeutic Swallow Intervention : PO Trials, Caregiver Education, Compensatory Strategies  Solid Diet Recommendations: Minced & moist/ground (IDDSI Level 5)  Liquid Diet Recommendations: Thin (IDDSI Level 0)  Swallow Comments:  agreeable to the modifed diet.       Pt fed pudding and crackers minced in yogurt with Mildly extended onlyoral phase of the swallow exhibited d/t reduced Cognition.   Mild only oral manipulation skills noted for the Minced textured solids so feel pt can tolerate the oral diet upgrade at this time given total feed assist and use of alternating sips/bites. Safe functional swallow demonstrated across the MM5/thins with no clinical concerns  of aspiration.       Inpatient:  Education Documentation  RN and  Jose educated on oral diet recommendations of the Minced Moist solids /thins with feed A and cyclic ingestion.  Jose pleased pt eating and not chewing for a long time. Pt aware of the swallowing precautions -guidelines in the pt's room and can verbalize the needed swallowing strategies to use during PO consumption.     Consultations/Referrals/Coordination of Services: SLP to follow at the next level of care as MM/thins best oral diet for pt at this time while in house in acute care           "

## 2024-01-19 NOTE — DISCHARGE SUMMARY
Discharge Diagnosis  Sacral osteomyelitis (CMS/[facility])    Issues Requiring Follow-Up  Patient fully evaluated on January 19, 2024. Patient is cleared to be discharged to skilled nursing facility.     Discharge Meds     Your medication list        START taking these medications        Instructions Last Dose Given Next Dose Due   mupirocin 2 % ointment  Commonly known as: Bactroban      Apply topically 2 times a day.       piperacillin-tazobactam 200 mg/mL injection  Commonly known as: Zosyn      Infuse 15 mL (3.375 g) over 30 minutes into a venous catheter every 6 hours for 14 days.       polyethylene glycol 17 gram packet  Commonly known as: Glycolax, Miralax  Start taking on: January 20, 2024      Take 17 g by mouth once daily. Do not start before January 20, 2024.       sodium hypochlorite 0.5 % external solution  Commonly known as: Dakin's Full-Strength      Apply topically 2 times a day.       vancomycin 1.25 g in dextrose 5 % 5 % 250 mL IVPB      Infuse 1.25 g at 200 mL/hr over 75 minutes into a venous catheter once every 24 hours.              CONTINUE taking these medications        Instructions Last Dose Given Next Dose Due   Accu-Chek Guide test strips strip  Generic drug: blood sugar diagnostic           acetaminophen 325 mg tablet  Commonly known as: Tylenol           calcitriol 0.25 mcg capsule  Commonly known as: Rocaltrol           carbidopa-levodopa  mg tablet  Commonly known as: Sinemet      Take 1/2 tab TID with small meals x 7 days then take 1 tab TID and continue       cholecalciferol 25 MCG (1000 UT) capsule  Commonly known as: Vitamin D-3           DULoxetine 30 mg DR capsule  Commonly known as: Cymbalta           gabapentin 100 mg capsule  Commonly known as: Neurontin      Take 1 capsule (100 mg) by mouth 3 times a day.       glimepiride 1 mg tablet  Commonly known as: Amaryl           lancets misc           levothyroxine 88 mcg tablet  Commonly known as: Synthroid, Levoxyl            metoprolol tartrate 25 mg tablet  Commonly known as: Lopressor           Narcan 4 mg/0.1 mL nasal spray  Generic drug: naloxone           oxyCODONE-acetaminophen 5-325 mg tablet  Commonly known as: Percocet      Take 1 tablet by mouth every 8 hours if needed for severe pain (7 - 10) or moderate pain (4 - 6). Do not start before January 5, 2024.       rosuvastatin 40 mg tablet  Commonly known as: Crestor      Take 1 tablet (40 mg) by mouth once daily.       tamsulosin 0.4 mg 24 hr capsule  Commonly known as: Flomax           Xarelto 20 mg tablet  Generic drug: rivaroxaban                  STOP taking these medications      amoxicillin-pot clavulanate 875-125 mg tablet  Commonly known as: Augmentin                  Where to Get Your Medications        You can get these medications from any pharmacy    Bring a paper prescription for each of these medications  piperacillin-tazobactam 200 mg/mL injection       Information about where to get these medications is not yet available    Ask your nurse or doctor about these medications  mupirocin 2 % ointment  polyethylene glycol 17 gram packet  sodium hypochlorite 0.5 % external solution  vancomycin 1.25 g in dextrose 5 % 5 % 250 mL IVPB         Test Results Pending At Discharge  Pending Labs       Order Current Status    Blood Culture Preliminary result    Blood Culture Preliminary result            Hospital Course      Shelbie Osorio  Internal Medicine     Progress Notes     Signed     Date of Service: 1/18/2024  2:31 PM     Signed       Expand All Collapse All    Irina Stratton is a 73 y.o. female on day 2 of admission presenting with Sacral osteomyelitis (CMS/HCC).           Subjective   Patient is a 73 year old female on day 1 of admission presenting with sacral osteomyelitis. Patient has a history of parkinson's, CKD, hypothyroidism, atrial fibrillation, diabetes, hypertension, hyperlipidemia, depression, MI, and cholelithiasis.               Objective   Last  Recorded Vitals  /70 (BP Location: Left arm, Patient Position: Lying)   Pulse 61   Temp 36.5 °C (97.7 °F) (Temporal)   Resp 18   Wt 47.6 kg (104 lb 15 oz)   SpO2 95%   Intake/Output last 3 Shifts:     Intake/Output Summary (Last 24 hours) at 1/18/2024 1431  Last data filed at 1/18/2024 1212      Gross per 24 hour   Intake 810 ml   Output 800 ml   Net 10 ml            Admission Weight  Weight: 47.6 kg (104 lb 15 oz) (01/16/24 0121)     Daily Weight  01/16/24 : 47.6 kg (104 lb 15 oz)     Image Results  CT abdomen pelvis wo IV contrast  Narrative: Interpreted By:  Finkelstein, Evan,   STUDY:  CT ABDOMEN PELVIS WO IV CONTRAST;  1/16/2024 1:28 am      INDICATION:  Signs/Symptoms:sacral osteomyelitis.      COMPARISON:  CT abdomen pelvis 06/29/2023      ACCESSION NUMBER(S):  [id number]      ORDERING CLINICIAN:  FILIBERTO MOSELEY      TECHNIQUE:  Axial noncontrast CT images of the abdomen and pelvis with coronal  and sagittal reconstructed images.      FINDINGS:  LOWER CHEST: Dependent atelectasis. There are coronary artery  calcifications.      ABDOMEN:  Lack of intravenous contrast limits evaluation of vessels and solid  organs. LIVER: Normal attenuation and contour.  BILE DUCTS: Normal caliber.  GALLBLADDER: No calcified gallstones. No wall thickening.  SPLEEN: Unremarkable.  PANCREAS: Unremarkable.  ADRENALS: Unremarkable.      KIDNEYS, URETERS, URINARY BLADDER: No hydronephrosis or urinary tract  calculus. Hyperdense lesions in the kidneys bilaterally are favored  to represent proteinaceous or hemorrhagic cysts. Additional hypodense  lesions measure simple fluid attenuation and are most compatible with  simple cysts. The urinary bladder is decompressed with a Rain  catheter. REPRODUCTIVE ORGANS: No abnormality, given limitations of  the noncontrast CT.  No significant free pelvic fluid.      ABDOMINAL WALL: Within normal limits.  PERITONEUM: No ascites or free air.      BOWEL: The stomach is decompressed,  which limits evaluation. No small  or large bowel dilatation. Large colonic stool burden. There is  rectal wall thickening.      VESSELS: Aorto bi-iliac stent graft repair of an aortic aneurysm  measuring up to approximately 4.6 cm. RETROPERITONEUM: No  pathologically enlarged lymph nodes.      BONES: There is soft tissue thickening overlying the mid to lower  sacrum. There are erosive changes involving the underlying sacrum  with adjacent punctate lucencies likely related to gas/infection. No      Impression: Soft tissue thickening overlying the mid to lower sacrum with  underlying erosive changes and adjacent punctate lucencies. Findings  suggest an age-indeterminate soft tissue infection along with  associated osteomyelitis.      Large colonic stool burden and associated rectal wall thickening.  Findings may represent a component of fecal impaction. Stercoral  colitis is also not excluded in the appropriate clinical setting.      Aorto bi-iliac stent graft repair of an infrarenal aortic aneurysm  measuring up to 4.6 cm in maximum AP dimension.      MACRO:  None.      Signed by: Evan Finkelstein 1/16/2024 1:56 AM  Dictation workstation:   [id number]        Physical Exam  Constitutional:       General: She is awake.      Appearance: Normal appearance.   HENT:      Head: Normocephalic and atraumatic.   Cardiovascular:      Rate and Rhythm: Normal rate and regular rhythm.      Heart sounds: Normal heart sounds.   Pulmonary:      Effort: Pulmonary effort is normal.      Breath sounds: Normal breath sounds and air entry.   Abdominal:      General: Bowel sounds are normal.      Palpations: Abdomen is soft.   Neurological:      Mental Status: She is alert.   Psychiatric:         Behavior: Behavior is cooperative.            Relevant Results        Scheduled medications  calcitriol, 0.25 mcg, oral, Once per day on Mon Wed Fri  carbidopa-levodopa, 1 tablet, oral, TID  cholecalciferol, 1,000 Units, oral,  Daily  DULoxetine, 30 mg, oral, Daily  gabapentin, 100 mg, oral, TID  hydrocortisone, , Topical, BID  insulin lispro, 0-10 Units, subcutaneous, q4h  levothyroxine, 88 mcg, oral, Daily  metoprolol tartrate, 25 mg, oral, BID  mupirocin, , Topical, BID  piperacillin-tazobactam, 3.375 g, intravenous, q6h  polyethylene glycol, 17 g, oral, Daily  rivaroxaban, 20 mg, oral, Daily  rosuvastatin, 40 mg, oral, Daily  sodium hypochlorite, , irrigation, BID  tamsulosin, 0.4 mg, oral, Daily  vancomycin, 1,250 mg, intravenous, q24h        Continuous medications  PRN medications  PRN medications: acetaminophen, dextrose 10 % in water (D10W), dextrose, glucagon, oxyCODONE-acetaminophen        Results for orders placed or performed during the hospital encounter of 01/16/24 (from the past 24 hour(s))   POCT GLUCOSE   Result Value Ref Range     POCT Glucose 110 (H) 74 - 99 mg/dL   POCT GLUCOSE   Result Value Ref Range     POCT Glucose 78 74 - 99 mg/dL   POCT GLUCOSE   Result Value Ref Range     POCT Glucose 107 (H) 74 - 99 mg/dL   Vancomycin, Trough   Result Value Ref Range     Vancomycin, Trough 12.4 5.0 - 20.0 ug/mL   Basic metabolic panel   Result Value Ref Range     Glucose 79 74 - 99 mg/dL     Sodium 146 (H) 136 - 145 mmol/L     Potassium 3.2 (L) 3.5 - 5.3 mmol/L     Chloride 115 (H) 98 - 107 mmol/L     Bicarbonate 22 21 - 32 mmol/L     Anion Gap 12 10 - 20 mmol/L     Urea Nitrogen 20 6 - 23 mg/dL     Creatinine 0.67 0.50 - 1.05 mg/dL     eGFR >90 >60 mL/min/1.73m*2     Calcium 8.4 (L) 8.6 - 10.3 mg/dL   POCT GLUCOSE   Result Value Ref Range     POCT Glucose 92 74 - 99 mg/dL   Lavender Top   Result Value Ref Range     Extra Tube Hold for add-ons.     POCT GLUCOSE   Result Value Ref Range     POCT Glucose 124 (H) 74 - 99 mg/dL                        Assessment/Plan            Principal Problem:    Sacral osteomyelitis (CMS/HCC)     Patient was fully evaluated on January 17, 2024. Blood cultures were negative. Will recheck labs in  AM.      Patient fully evaluated on January 18, 2024. Blood cultures were negative. Patient stated she was doing good and had no acute concerns. Will recheck labs in AM.         Malnutrition Diagnosis Status: New  Malnutrition Diagnosis: Moderate malnutrition related to chronic disease or condition  As Evidenced by: mild-moderate fat wasting; mild-moderate muscle wasting  I agree with the dietitian's malnutrition diagnosis.              Shelbie Osorio                  Cosigned by: Javier Desouza DO at 1/18/2024  4:38 PM     Revision History       Patient fully evaluated on January 19, 2024. Patient is cleared to be discharged to skilled nursing facility.     Pertinent Physical Exam At Time of Discharge  Physical Exam      Outpatient Follow-Up  Future Appointments   Date Time Provider Department [place]   2/23/2024  9:00 AM PAR NEURODG [facility]   2/28/2024  8:00 AM Mindi Stahl MD [id number] [place]   4/9/2024 11:40 AM Mindi Stahl MD [id number] [place]   6/27/2024 11:00 AM Mario Joe MD [id number] [place]         Shelbie Osorio

## 2024-01-19 NOTE — CARE PLAN
Problem: Skin  Goal: Decreased wound size/increased tissue granulation at next dressing change  Outcome: Progressing     Problem: Skin  Goal: Participates in plan/prevention/treatment measures  Outcome: Progressing     Problem: Skin  Goal: Prevent/manage excess moisture  Outcome: Progressing     Problem: Skin  Goal: Prevent/minimize sheer/friction injuries  Outcome: Progressing     Problem: Skin  Goal: Promote/optimize nutrition  Outcome: Progressing     Problem: Skin  Goal: Promote skin healing  Outcome: Progressing     Problem: Pain  Goal: My pain/discomfort is manageable  Outcome: Met   The patient's goals for the shift include  comfort    The clinical goals for the shift include pain management

## 2024-01-19 NOTE — CARE PLAN
The patient's goals for the shift include      The clinical goals for the shift include pain management      Problem: Skin  Goal: Decreased wound size/increased tissue granulation at next dressing change  Outcome: Progressing     Problem: Skin  Goal: Participates in plan/prevention/treatment measures  Outcome: Progressing     Problem: Skin  Goal: Prevent/manage excess moisture  Outcome: Progressing     Problem: Skin  Goal: Prevent/minimize sheer/friction injuries  Outcome: Progressing     Problem: Skin  Goal: Promote/optimize nutrition  Outcome: Progressing     Problem: Skin  Goal: Promote skin healing  Outcome: Progressing     Problem: Pain  Goal: My pain/discomfort is manageable  Outcome: Met

## 2024-01-19 NOTE — CARE PLAN
Problem: Skin  Goal: Decreased wound size/increased tissue granulation at next dressing change  Outcome: Progressing  Goal: Participates in plan/prevention/treatment measures  Outcome: Progressing  Goal: Prevent/manage excess moisture  Outcome: Progressing  Goal: Prevent/minimize sheer/friction injuries  Outcome: Progressing  Goal: Promote/optimize nutrition  Outcome: Progressing  Goal: Promote skin healing  Outcome: Progressing     Problem: Pain  Goal: My pain/discomfort is manageable  Outcome: Met     Problem: Safety  Goal: Patient will be injury free during hospitalization  Outcome: Met  Goal: I will remain free of falls  Outcome: Met   The patient's goals for the shift include  comfort    The clinical goals for the shift include pain management

## 2024-01-19 NOTE — CARE PLAN
Problem: Skin  Goal: Decreased wound size/increased tissue granulation at next dressing change  Outcome: Progressing  Goal: Participates in plan/prevention/treatment measures  Outcome: Progressing  Goal: Prevent/manage excess moisture  1/19/2024 0621 by Tr Villalba RN  Outcome: Met  1/19/2024 0611 by Tr Villalba RN  Outcome: Progressing  Goal: Prevent/minimize sheer/friction injuries  1/19/2024 0621 by Tr Villalba RN  Outcome: Met  1/19/2024 0611 by Tr Villalba RN  Outcome: Progressing  Goal: Promote/optimize nutrition  Outcome: Progressing  Goal: Promote skin healing  1/19/2024 0622 by Tr Villalba RN  Outcome: Met  1/19/2024 0611 by Tr Villalba RN  Outcome: Progressing     Problem: Pain  Goal: My pain/discomfort is manageable  Outcome: Met     Problem: Safety  Goal: Patient will be injury free during hospitalization  Outcome: Met  Goal: I will remain free of falls  Outcome: Met     Problem: Daily Care  Goal: Daily care needs are met  Outcome: Met     Problem: Psychosocial Needs  Goal: Demonstrates ability to cope with hospitalization/illness  Outcome: Progressing   The patient's goals for the shift include  comfort    The clinical goals for the shift include pain management

## 2024-01-19 NOTE — PROGRESS NOTES
Per [facility]:  The S Vehicle you requested for Irina BUTTERFIELDCm in unit/room 729A on 01/19/2024 is scheduled to arrive at 9:00pm EST! [facility] is handling this ride and you can contact them at [phone].   Bedside nurse notified.  BEATA SILVA

## 2024-01-19 NOTE — CARE PLAN
Problem: Skin  Goal: Decreased wound size/increased tissue granulation at next dressing change  Outcome: Progressing  Goal: Participates in plan/prevention/treatment measures  Outcome: Progressing  Goal: Prevent/manage excess moisture  1/19/2024 0621 by Tr Villalba RN  Outcome: Met  1/19/2024 0611 by Tr Villalba RN  Outcome: Progressing  Goal: Prevent/minimize sheer/friction injuries  1/19/2024 0621 by Tr Villalba RN  Outcome: Met  1/19/2024 0611 by Tr Villalba RN  Outcome: Progressing  Goal: Promote/optimize nutrition  Outcome: Progressing  Goal: Promote skin healing  Outcome: Progressing   The patient's goals for the shift include  comfort    The clinical goals for the shift include pain management

## 2024-01-20 NOTE — CARE PLAN
The patient's goals for the shift include      The clinical goals for the shift include pain management      Problem: Skin  Goal: Decreased wound size/increased tissue granulation at next dressing change  1/20/2024 0030 by Annel Martini RN  Outcome: Adequate for Discharge  1/20/2024 0030 by Annel Martini RN  Flowsheets (Taken 1/18/2024 1544 by Camila Brooks RN)  Decreased wound size/increased tissue granulation at next dressing change: Promote sleep for wound healing  Goal: Participates in plan/prevention/treatment measures  1/20/2024 0030 by Annel Martini RN  Outcome: Adequate for Discharge  1/20/2024 0030 by Annel Martini RN  Flowsheets (Taken 1/18/2024 1544 by Camila Brooks RN)  Participates in plan/prevention/treatment measures: Elevate heels  Goal: Promote/optimize nutrition  1/20/2024 0030 by Annel Martini RN  Outcome: Adequate for Discharge  1/20/2024 0030 by Annel Martini RN  Flowsheets (Taken 1/18/2024 1544 by Camila Brooks RN)  Promote/optimize nutrition: Consume > 50% meals/supplements     Problem: Psychosocial Needs  Goal: Demonstrates ability to cope with hospitalization/illness  Outcome: Adequate for Discharge  Goal: Collaborate with me, my family, and caregiver to identify my specific goals  Outcome: Adequate for Discharge     Problem: Discharge Barriers  Goal: My discharge needs are met  Outcome: Adequate for Discharge     Problem: Safety  Goal: LTG - Patient will adhere to hip precautions during ADL's and transfers  Outcome: Adequate for Discharge  Goal: LTG - Patient will demonstrate safety requirements appropriate to situation/environment  Outcome: Adequate for Discharge  Goal: LTG - Patient will utilize safety techniques  Outcome: Adequate for Discharge  Goal: STG - Patient locks brakes on wheelchair  Outcome: Adequate for Discharge  Goal: STG - Patient uses call light consistently to request assistance with transfers  Outcome: Adequate for  Discharge  Goal: STG - Patient uses gait belt during all transfers  Outcome: Adequate for Discharge  Goal: Goal 1  Outcome: Adequate for Discharge  Goal: Goal 2  Outcome: Adequate for Discharge  Goal: Goal 3  Outcome: Adequate for Discharge

## 2024-01-31 NOTE — DOCUMENTATION CLARIFICATION NOTE
"    PATIENT:               ANTHONY RODRIGUES  ACCT #:                  [id number]  MRN:                       [id number]  :                       1950  ADMIT DATE:       2024 12:48 AM  DISCH DATE:        2024 11:45 PM  RESPONDING PROVIDER #:        [id number]          PROVIDER RESPONSE TEXT:    Sepsis was a differential diagnosis and ruled out after study    CDI QUERY TEXT:    [facility]_CV Sepsis        Instruction:  Based on your assessment of the patient and the clinical information, please provide the requested documentation by clicking on the appropriate radio button and enter any additional information if prompted.    Question: Sepsis was documented in the medical record. Based on the documentation and the clinical information, can the diagnosis be further clarified as      When answering this query, please exercise your independent professional judgment. The fact that a question is being asked, does not imply that any particular answer is desired or expected.    The patient's clinical indicators include:  Clinical Information:  73 year old female with h/o Parkinson's presented with fever and altered mental status.  She was recently started on antibiotics for a sacral ulcer.  \"CT pelvis concerning for sacral osteomyelitis.  Urinalysis has pyuria.  She does have a chronic indwelling Rain catheter.  Rain catheter was replaced.  Patient has a leukocytosis.\" per 24 ED Provider Note    Documented Diagnosis:  \"Sepsis\" was only documented in the 24 ED Provider Note, Dr. Wheatley    Clinical Indicators for 24:  -Vital Signs 24:   T: 37.3, HR: 110, RR: 19, BP: 177/88 POx: 95 on room air  -WBC:  16  -Microbiology Results:  positive UA,  Urine Cx: Multiple Organisms present, probable contamination.  Negative for Flu, RSV, COVID  -Band Neutrophil Count/percent Band Neutrophil: n/a  -Lactic acid:  2.1  -BUN/Creat:  34/0.96  -Blood cultures:  NGTD  -Bilirubin:  0.7  -MAP: 123  -Kinde Coma Scale:  " 13  -PAO2/FIO2: n/a  -Procalcitonin: not drawn  -Platelets:  278  -Other clinical indicators:    Treatment:  IV vancomycin and Zosyn    Risk Factors:  sacral osteomyelitis  Options provided:  -- Sepsis was a differential diagnosis and ruled out after study  -- Sepsis with neurological organ dysfunction of Metabolic Encephalopathy  -- Sepsis with other organ dysfunction, Please specify sepsis associated organ dysfunction below  -- Other - I will add my own diagnosis  -- Refer to Clinical Documentation Reviewer    Query created by: Manuela Maza on 1/25/2024 2:39 PM      Electronically signed by:  FILIBERTO PIÑA MD 1/31/2024 5:56 PM

## 2024-02-24 PROBLEM — T83.511A URINARY TRACT INFECTION ASSOCIATED WITH INDWELLING URETHRAL CATHETER, INITIAL ENCOUNTER (CMS-HCC): Status: ACTIVE | Noted: 2024-01-01

## 2024-02-24 PROBLEM — N39.0 URINARY TRACT INFECTION ASSOCIATED WITH INDWELLING URETHRAL CATHETER, INITIAL ENCOUNTER (CMS-HCC): Status: ACTIVE | Noted: 2024-01-01

## 2024-02-24 PROBLEM — N39.0 UTI (URINARY TRACT INFECTION): Status: ACTIVE | Noted: 2024-01-01

## 2024-02-24 NOTE — PROGRESS NOTES
Pharmacy Medication History Review    Irina Stratton is a 73 y.o. female admitted for UTI (urinary tract infection). Pharmacy reviewed the patient's [id number] medications and allergies for accuracy.    The list below reflectives the updated PTA list. Please review each medication in order reconciliation for additional clarification and justification.  Prior to Admission medications    Medication Sig Start Date End Date Taking? Authorizing Provider   triamcinolone (Kenalog) 0.1 % cream Apply 1 Application topically if needed for rash. 2/16/24  Yes Historical Provider, MD   calcitriol (Rocaltrol) 0.25 mcg capsule Take 1 capsule (0.25 mcg) by mouth. Monday, Wednesday, and Friday 4/9/21   Historical Provider, MD   carbidopa-levodopa (Sinemet)  mg tablet Take 1/2 tab TID with small meals x 7 days then take 1 tab TID and continue 12/12/23   Mindi Stahl MD   cholecalciferol (Vitamin D-3) 25 MCG (1000 UT) capsule Take 3 capsules (75 mcg) by mouth once daily.    Historical Provider, MD   DULoxetine (Cymbalta) 30 mg DR capsule Take 1 capsule (30 mg) by mouth once daily. 11/5/23   Historical Provider, MD   gabapentin (Neurontin) 100 mg capsule Take 1 capsule (100 mg) by mouth 3 times a day. 12/18/23   Ramiro Liu MD   glimepiride (Amaryl) 1 mg tablet Take 0.5 tablets (0.5 mg) by mouth once daily in the morning. 10/9/23   Historical Provider, MD   levothyroxine (Synthroid, Levoxyl) 88 mcg tablet Take 1 tablet (88 mcg) by mouth once daily in the morning. Take before meals. 11/15/23   Historical Provider, MD   metoprolol tartrate (Lopressor) 25 mg tablet Take 1 tablet (25 mg) by mouth 2 times a day. 6/12/20   Historical Provider, MD   oxyCODONE-acetaminophen (Percocet) 5-325 mg tablet Take 1 tablet by mouth 3 times a day.    Historical Provider, MD   rivaroxaban (Xarelto) 20 mg tablet Take 1 tablet (20 mg) by mouth once daily. 7/12/21   Historical Provider, MD   rosuvastatin (Crestor) 40 mg tablet Take  1 tablet (40 mg) by mouth once daily. 11/22/23 2/24/24  Mario Joe MD        The list below reflectives the updated allergy list. Please review each documented allergy for additional clarification and justification.  Allergies  Reviewed by Krystal Santamaria CPhT on 2/24/2024        Severity Reactions Comments    Vancomycin Low Itching             Below are additional concerns with the patient's PTA list.      Krystal Santamaria CPhT

## 2024-02-24 NOTE — PROGRESS NOTES
Vancomycin Dosing by Pharmacy- INITIAL    Irina Stratton is a 73 y.o. year old female who Pharmacy has been consulted for vancomycin dosing for pneumonia. Based on the patient's indication and renal status this patient will be dosed based on a goal AUC of 400-600.     Renal function is currently stable.    Visit Vitals  /72   Pulse 96   Temp 37.5 °C (99.5 °F) (Temporal)   Resp 20        Lab Results   Component Value Date    CREATININE 0.80 02/23/2024    CREATININE 0.82 02/02/2024    CREATININE 0.76 01/23/2024    CREATININE 0.82 01/19/2024        Lab Results   Component Value Date    PATIENTTEMP 37.0 01/16/2024          Assessment/Plan     Patient has already been given a loading dose of 1500 mg.  Will initiate vancomycin maintenance,  1250 mg every 24 hours.    This dosing regimen is predicted by [employer] to result in the following pharmacokinetic parameters:  Regimen: 1250 mg IV every 24 hours.  Start time: 00:29 on 02/25/2024  Exposure target: AUC24 (range)400-600 mg/L.hr   AUC24,ss: 539 mg/L.hr  Probability of AUC24 > 400: 81 %  Ctrough,ss: 15.1 mg/L  Probability of Ctrough,ss > 20: 27 %  Probability of nephrotoxicity (Lodise MATILDA 2009): 10 %    Follow-up level will be ordered on 2/25 at 1000 unless clinically indicated sooner.  Will continue to monitor renal function daily while on vancomycin and order serum creatinine at least every 48 hours if not already ordered.  Follow for continued vancomycin needs, clinical response, and signs/symptoms of toxicity.       Pricilla Chamberlain, [facility]

## 2024-02-24 NOTE — ED PROVIDER NOTES
HPI   Chief Complaint   Patient presents with    Fever    Lethargy       Brought in by EMS from home for lethargy.  Noted to be febrile by EMS.  No reported falls or head trauma.    Spouse later presented to bedside to provide further collateral information.  States that patient does not communicate much and is not ambulatory at baseline.  States the patient was more sleepy than normal today, which is why EMS was called.  States the patient's Rain catheter was last changed on Wednesday.      History provided by:  Patient, EMS personnel and spouse  History limited by:  Dementia   used: No                        Pawling Coma Scale Score: 12                     Patient History   Past Medical History:   Diagnosis Date    Abnormal radiologic findings on diagnostic imaging of right kidney     Abnormal ultrasound of both kidneys    Personal history of other medical treatment     History of echocardiogram     Past Surgical History:   Procedure Laterality Date     SECTION, CLASSIC  2016     Section    CT ABDOMEN PELVIS ANGIOGRAM W AND/OR WO IV CONTRAST  2019    CT ABDOMEN PELVIS ANGIOGRAM W AND/OR WO IV CONTRAST 2019 [facility]    CT AORTA AND BILATERAL ILIOFEMORAL RUNOFF ANGIOGRAM W AND/OR WO IV CONTRAST  2020    CT AORTA AND BILATERAL ILIOFEMORAL RUNOFF ANGIOGRAM W AND/OR WO IV CONTRAST 2020 [facility] CL[facility]ICAL LEGACY    OTHER SURGICAL HISTORY  2020    History of prior surgery    OTHER SURGICAL HISTORY  2020    Coronary artery stent placement    OTHER SURGICAL HISTORY  2020    Abdominal aortic aneurysm repair endovascular    OTHER SURGICAL HISTORY  2020    Cardiac catheterization    OTHER SURGICAL HISTORY  2019    Renal angioplasty and stenting     Family History   Problem Relation Name Age of Onset    Stroke Mother       Social History     Tobacco Use    Smoking status: Former     Types: Cigarettes     Quit date:       Years since quittin.1    Smokeless tobacco: Never   Vaping Use    Vaping Use: Never used   Substance Use Topics    Alcohol use: Not Currently    Drug use: Never       Physical Exam   ED Triage Vitals [24 2240]   Temperature Heart Rate Respirations BP   37.5 °C (99.5 °F) 96 20 145/71      Pulse Ox Temp Source Heart Rate Source Patient Position   95 % Temporal -- Lying      BP Location FiO2 (%)     Left arm --       Physical Exam  Vitals and nursing note reviewed.   HENT:      Head: Atraumatic.      Mouth/Throat:      Mouth: Mucous membranes are moist.   Eyes:      Conjunctiva/sclera: Conjunctivae normal.   Cardiovascular:      Rate and Rhythm: Normal rate and regular rhythm.   Pulmonary:      Effort: Pulmonary effort is normal.   Abdominal:      Palpations: Abdomen is soft.      Tenderness: There is no abdominal tenderness.   Genitourinary:     Comments: Rain catheter in place draining ewrin urine  Musculoskeletal:         General: No deformity.      Cervical back: Normal range of motion.   Skin:     General: Skin is warm and dry.   Neurological:      Mental Status: She is alert.      Comments: Moving all extremities         ED Course & MDM   ED Course as of 24 0205    Given history of sacral osteomyelitis, exam performed with several chaperones at bedside.  Patient has dressings over her sacrum.  These dressings were removed and there is a small area that is packed without surrounding erythema or calor.  Patient has no change in affect with palpation of the area.  No induration or fluctuance appreciated.  No drainage. [AB]   2316 WBC(!): 12.0 [AB]   2316 Temperature: 37.5 °C (99.5 °F)  Reportedly temp 102 with EMS [AB]   2317 Nursing to exchange Rain [AB]   Sat 2024   0038 BNP(!): 390 [AB]   0135 Urinalysis with Reflex Culture and Microscopic(!)  Concern for sepsis of urinary etiology after UA resulted just now [AB]   0204 I performed a sepsis reperfusion exam [AB]       ED Course User Index  [AB] Corky Lazo MD         Diagnoses as of 02/24/24 0205   Urinary tract infection associated with indwelling urethral catheter, initial encounter (CMS/[facility])   Acute encephalopathy   Sepsis without acute organ dysfunction, due to unspecified organism (CMS/[facility])       Medical Decision Making  Past medical history that includes parkinson's, CKD, hypothyroidism, atrial fibrillation, diabetes, hypertension, hyperlipidemia, depression, MI, cholelithiasis, sacral osteomyelitis, and chronic Rain catheter presents with lethargy and fever.    Clinical picture consistent with sepsis of urinary etiology complicated by acute encephalopathy.  Obtain cultures and started on antibiotics.  Will escalate care to hospitalization for further management.    Amount and/or Complexity of Data Reviewed  Labs: ordered.  Discussion of management or test interpretation with external provider(s): The admitting physician    Risk  Decision regarding hospitalization.        Procedure  Critical Care    Performed by: Corky Lazo MD  Authorized by: Corky Lazo MD    Critical care provider statement:     Critical care time (minutes):  31    Critical care time was exclusive of:  Separately billable procedures and treating other patients    Critical care was necessary to treat or prevent imminent or life-threatening deterioration of the following conditions:  Sepsis    Critical care was time spent personally by me on the following activities:  Blood draw for specimens, development of treatment plan with patient or surrogate, discussions with primary provider, evaluation of patient's response to treatment, examination of patient, obtaining history from patient or surrogate, ordering and performing treatments and interventions, ordering and review of laboratory studies, pulse oximetry, re-evaluation of patient's condition and review of old charts    Care discussed with: admitting provider         Corky LOPEZ  MD Violet  02/24/24 0202

## 2024-02-24 NOTE — H&P
History Of Present Illness  Irina Stratton is a 73 y.o. female with past medical history of Parkinson's, CKD, hypothyroidism, atrial fibrillation on Xarelto, diabetes, hypertension, hyperlipidemia, depression, MI, sacral osteomyelitis, and cholelithiasis, who presented to [facility] ED today via EMS from home for lethargy. EMS stated she had a fever en route. Patient has a albarran catheter that was recently changed on Wednesday.  stated patient has been more lethargic than normal. He states she does not communicate much and is non-ambulatory at baseline. Additional history obtained from chart.    ED course: Temp 37.5C, /71, HR 96, RR 20, 95% RA. EKG unavailable for my review. Labs: WBC 12.0, neutrophils 10.04. RBC 3.98, H&H 10.9/36.4.[id number]. Sodium 135. BUN 29. . Troponin 13. UA: hazy, 3+protein, moderate blood, small leukocytes, 1+bacteria, RBC>20, WBC 21-50. Urine and blood cultures collected. See imaging results below. Azithromycin, zosyn, vancomycin started in ED. Patient will be admitted under the care of Dr. Fitzgerald who will continue to follow. I was asked to H&P and place initial admission orders.  Past Medical History  Past Medical History:   Diagnosis Date    Abnormal radiologic findings on diagnostic imaging of right kidney     Abnormal ultrasound of both kidneys    Personal history of other medical treatment     History of echocardiogram       Surgical History  Past Surgical History:   Procedure Laterality Date     SECTION, CLASSIC  2016     Section    CT ABDOMEN PELVIS ANGIOGRAM W AND/OR WO IV CONTRAST  2019    CT ABDOMEN PELVIS ANGIOGRAM W AND/OR WO IV CONTRAST 2019 [facility]    CT AORTA AND BILATERAL ILIOFEMORAL RUNOFF ANGIOGRAM W AND/OR WO IV CONTRAST  2020    CT AORTA AND BILATERAL ILIOFEMORAL RUNOFF ANGIOGRAM W AND/OR WO IV CONTRAST 2020 [facility] CLINICAL LEGACY    OTHER SURGICAL HISTORY  2020    History of prior surgery     OTHER SURGICAL HISTORY  01/31/2020    Coronary artery stent placement    OTHER SURGICAL HISTORY  06/11/2020    Abdominal aortic aneurysm repair endovascular    OTHER SURGICAL HISTORY  01/31/2020    Cardiac catheterization    OTHER SURGICAL HISTORY  01/17/2019    Renal angioplasty and stenting        Social History  She reports that she quit smoking about 13 months ago. Her smoking use included cigarettes. She has never used smokeless tobacco. She reports that she does not currently use alcohol. She reports that she does not use drugs. Lives with . Bedbound.    Family History  Family History   Problem Relation Name Age of Onset    Stroke Mother          Allergies  Patient has no known allergies.    Review of Systems   Limited ROS due to patient mentation.  Physical Exam  Constitutional:       Appearance: She is ill-appearing.      Comments: Cachetic appearance   HENT:      Head: Normocephalic and atraumatic.      Nose: Nose normal.      Mouth/Throat:      Mouth: Mucous membranes are moist.      Pharynx: Oropharynx is clear.      Comments: Poor dentition  Eyes:      Extraocular Movements: Extraocular movements intact.      Conjunctiva/sclera: Conjunctivae normal.      Pupils: Pupils are equal, round, and reactive to light.   Cardiovascular:      Rate and Rhythm: Normal rate and regular rhythm.      Pulses: Normal pulses.      Heart sounds: Normal heart sounds.   Pulmonary:      Effort: Pulmonary effort is normal.      Breath sounds: Normal breath sounds.   Abdominal:      General: Abdomen is flat. Bowel sounds are normal.      Palpations: Abdomen is soft.   Genitourinary:     Comments: Rain in place  Musculoskeletal:         General: Normal range of motion.      Cervical back: Normal range of motion and neck supple.   Skin:     General: Skin is warm and dry.      Capillary Refill: Capillary refill takes 2 to 3 seconds.      Comments: Dressing to sacrum   Neurological:      General: No focal deficit present.       Mental Status: She is alert. She is disoriented.      Comments: Answered some questions and followed some commands. Confusion noted.   Psychiatric:         Mood and Affect: Mood normal.          Last Recorded Vitals  Blood pressure 140/70, pulse 98, temperature 37.5 °C (99.5 °F), temperature source Temporal, resp. rate 18, weight 52 kg (114 lb 10.2 oz), SpO2 94 %.    Relevant Results  Results for orders placed or performed during the hospital encounter of 02/23/24 (from the past 24 hour(s))   CBC and Auto Differential   Result Value Ref Range    WBC 12.0 (H) 4.4 - 11.3 x10*3/uL    nRBC 0.0 0.0 - 0.0 /100 WBCs    RBC 3.93 (L) 4.00 - 5.20 x10*6/uL    Hemoglobin 10.9 (L) 12.0 - 16.0 g/dL    Hematocrit 36.4 36.0 - 46.0 %    MCV 93 80 - 100 fL    MCH 27.7 26.0 - 34.0 pg    MCHC 29.9 (L) 32.0 - 36.0 g/dL    RDW 19.6 (H) 11.5 - 14.5 %    Platelets 232 150 - 450 x10*3/uL    Neutrophils % 83.7 40.0 - 80.0 %    Immature Granulocytes %, Automated 0.5 0.0 - 0.9 %    Lymphocytes % 7.8 13.0 - 44.0 %    Monocytes % 7.4 2.0 - 10.0 %    Eosinophils % 0.3 0.0 - 6.0 %    Basophils % 0.3 0.0 - 2.0 %    Neutrophils Absolute 10.04 (H) 1.60 - 5.50 x10*3/uL    Immature Granulocytes Absolute, Automated 0.06 0.00 - 0.50 x10*3/uL    Lymphocytes Absolute 0.94 0.80 - 3.00 x10*3/uL    Monocytes Absolute 0.89 (H) 0.05 - 0.80 x10*3/uL    Eosinophils Absolute 0.04 0.00 - 0.40 x10*3/uL    Basophils Absolute 0.04 0.00 - 0.10 x10*3/uL   Comprehensive Metabolic Panel   Result Value Ref Range    Glucose 166 (H) 74 - 99 mg/dL    Sodium 135 (L) 136 - 145 mmol/L    Potassium 4.1 3.5 - 5.3 mmol/L    Chloride 100 98 - 107 mmol/L    Bicarbonate 27 21 - 32 mmol/L    Anion Gap 12 10 - 20 mmol/L    Urea Nitrogen 29 (H) 6 - 23 mg/dL    Creatinine 0.80 0.50 - 1.05 mg/dL    eGFR 78 >60 mL/min/1.73m*2    Calcium 9.1 8.6 - 10.3 mg/dL    Albumin 3.4 3.4 - 5.0 g/dL    Alkaline Phosphatase 102 33 - 136 U/L    Total Protein 8.4 (H) 6.4 - 8.2 g/dL    AST 27 9 - 39 U/L     Bilirubin, Total 0.7 0.0 - 1.2 mg/dL    ALT 18 7 - 45 U/L   Lactate   Result Value Ref Range    Lactate 1.9 0.4 - 2.0 mmol/L   B-Type Natriuretic Peptide   Result Value Ref Range     (H) 0 - 99 pg/mL   Troponin I, High Sensitivity   Result Value Ref Range    Troponin I, High Sensitivity 13 0 - 13 ng/L   Urinalysis with Reflex Culture and Microscopic   Result Value Ref Range    Color, Urine Yellow Straw, Yellow    Appearance, Urine Hazy (N) Clear    Specific Gravity, Urine 1.017 1.005 - 1.035    pH, Urine 8.0 5.0, 5.5, 6.0, 6.5, 7.0, 7.5, 8.0    Protein, Urine >=500 (3+) (N) NEGATIVE mg/dL    Glucose, Urine NEGATIVE NEGATIVE mg/dL    Blood, Urine MODERATE (2+) (A) NEGATIVE    Ketones, Urine NEGATIVE NEGATIVE mg/dL    Bilirubin, Urine NEGATIVE NEGATIVE    Urobilinogen, Urine <2.0 <2.0 mg/dL    Nitrite, Urine NEGATIVE NEGATIVE    Leukocyte Esterase, Urine SMALL (1+) (A) NEGATIVE   Microscopic Only, Urine   Result Value Ref Range    WBC, Urine 21-50 (A) 1-5, NONE /HPF    RBC, Urine >20 (A) NONE, 1-2, 3-5 /HPF    Bacteria, Urine 1+ (A) NONE SEEN /HPF     XR chest 1 view    Result Date: 2/24/2024  STUDY: Chest Radiograph;  02/23/2024 11:02 PM INDICATION: Sepsis, evaluate for source.  COMPARISON: XR chest 12/04/2022.  CT chest/abdomen/pelvis 06/01/2023.  ACCESSION NUMBER(S): [id number] ORDERING CLINICIAN: KENNA ALCALA TECHNIQUE:  Frontal chest was obtained at 2302 hours. FINDINGS: CARDIOMEDIASTINAL SILHOUETTE: Cardiomediastinal silhouette is enlarged in size and configuration. Calcified plaque is seen in the aorta.  LUNGS: Increased interstitial markings are seen bilaterally with subtle patchy right basilar airspace opacity.  ABDOMEN: No remarkable upper abdominal findings.  BONES: No acute osseous changes.  Generalized osseous demineralization is noted.    Cardiomegaly with increased interstitial markings and subtle patchy right infrahilar airspace opacity.  Mild pulmonary edema secondary to CHF is favored  although right lower lobe pneumonia is a possibility in the appropriate clinical setting. Signed by Salvatore Parada        Assessment/Plan   Principal Problem:    UTI (urinary tract infection)     73-year-old female with past medical history of Parkinson's, CKD, hypothyroidism, atrial fibrillation on Xarelto, diabetes, hypertension, hyperlipidemia, depression, MI, sacral osteomyelitis, and cholelithiasis, who presented to [facility] ED today via EMS from home for lethargy. EMS stated she had a fever en route. Patient has a albarran catheter that was recently changed on Wednesday.  Continue antibiotics. Patient to be hospitalized for further medical management.     #Suspect sepsis likely 2/2 to pneumonia vs UTI  #Suspect pneumonia RLL  #Suspected UTI likely 2/2 chronic indwelling albarran catheter  #Elevated BNP  #Acute metabolic encephalopathy likely 2/2 suspected UTI/ pneumonia   #Leukocytosis  Admit to OBS/telemetry to Dr. Fitzgerald  Defer consults to attending per request  See imaging results above  Echocardiogram ordered  Continue Azithromycin, Zosyn and vancomycin. De-escalate as appropriate  Strep pneumonia urine and legionella ordered  Sputum culture ordered  Titrate oxygen as needed  Blood and Urine cultures pending  Change albarran catheter  Daily weight  PT/OT  Social work  Repeat labs in AM    #Pressure ulcer of sacral region, stage 4  #Pressure ulcer left hip, stage 1  Continue wound care regimen  Air mattress  Turn q 2 hours    #Acute on chronic anemia  H&H 10.9/36.4  Continue to monitor    Chronic issues  #Parkinson's disease  #CKD  #Hypothyroidism  #Atrial fibrillation  #DMII  #HTN  #HLD  #Depression  #MI  #Cholelithiasis  Continue home medications as appropriate when nursing completes home med rec  SSI with hypoglycemic protocol    #DVT prophylaxis  Continue home Xarelto  SCDs    I spent 45 minutes in the professional and overall care of this patient.    Dinah Bower, APRN-CNP      Addendum patient seen and  evaluated agree with all the above to treat her for a urinary tract infection change in mental status history of Parkinson's repeat the CBC and the electrolytes continue with all above management thank you

## 2024-02-25 NOTE — CONSULTS
Vancomycin Dosing by Pharmacy- Cessation of Therapy    Consult to pharmacy for vancomycin dosing has been discontinued by the prescriber, pharmacy will sign off at this time.    Please call pharmacy if there are further questions or re-enter a consult if vancomycin is resumed.     José Luis Dominguez, [facility]

## 2024-02-25 NOTE — CARE PLAN
The patient's goals for the shift include      The clinical goals for the shift include  no falls

## 2024-02-25 NOTE — PROGRESS NOTES
"Vancomycin Dosing by Pharmacy- FOLLOW UP    Irina Stratton is a 73 y.o. year old female who Pharmacy has been consulted for vancomycin dosing for pneumonia. Based on the patient's indication and renal status this patient is being dosed based on a goal AUC of 400-600.     Renal function is currently stable.    Current vancomycin dose: 1250 mg given every 24 hours    Most recent random level: 15.8 mcg/mL    Visit Vitals  /67   Pulse 54   Temp 35.7 °C (96.3 °F)   Resp 16        Lab Results   Component Value Date    CREATININE 0.87 02/25/2024    CREATININE 0.80 02/23/2024    CREATININE 0.82 02/02/2024    CREATININE 0.76 01/23/2024        Patient weight is No results found for: \"PTWEIGHT\"    No results found for: \"CULTURE\"     I/O last 3 completed shifts:  In: 1105 (19.5 mL/kg) [I.V.:755 (13.3 mL/kg); IV Piggyback:350]  Out: 775 (13.7 mL/kg) [Urine:775 (0.4 mL/kg/hr)]  Weight: 56.7 kg   [unfilled]    Lab Results   Component Value Date    PATIENTTEMP 37.0 01/16/2024        Assessment/Plan    Within goal AUC range. Continue current vancomycin regimen.    This dosing regimen is predicted by [employer] to result in the following pharmacokinetic parameters:  Loading dose: N/A  Regimen: 1250 mg IV every 24 hours.  Start time: 00:54 on 02/26/2024  Exposure target: AUC24 (range)400-600 mg/L.hr   AUC24,ss: 513 mg/L.hr  Probability of AUC24 > 400: 93 %  Ctrough,ss: 14.6 mg/L  Probability of Ctrough,ss > 20: 14 %  Probability of nephrotoxicity (Lodise MATILDA 2009): 10 %    The next level will be obtained on 2/26/24 at 10 am. May be obtained sooner if clinically indicated.   Will continue to monitor renal function daily while on vancomycin and order serum creatinine at least every 48 hours if not already ordered.  Follow for continued vancomycin needs, clinical response, and signs/symptoms of toxicity.       YAYO ALEJANDRE, PharmD           "

## 2024-02-26 NOTE — PROGRESS NOTES
Irina Stratton is a 73 y.o. female on day 2 of admission presenting with UTI (urinary tract infection).      Subjective   Seen today de-escalated the triple antibiotic use just to Zosyn and treating really the right lower lobe pneumonia the urinary tract had 20,000 growth not treating that and she was positive also for staph in her nares on the nares swab       Objective     Last Recorded Vitals  /67   Pulse 59   Temp 35.6 °C (96.1 °F)   Resp 18   Wt 56.7 kg (125 lb)   SpO2 93%   Intake/Output last 3 Shifts:    Intake/Output Summary (Last 24 hours) at 2/26/2024 1347  Last data filed at 2/26/2024 0529  Gross per 24 hour   Intake 150 ml   Output 750 ml   Net -600 ml       Admission Weight  Weight: 52 kg (114 lb 10.2 oz) (02/24/24 0300)    Daily Weight  02/26/24 : 56.7 kg (125 lb)    Image Results  Transthoracic Echo (TTE) [facility], [address] [place], [place] [address]Tel [phone] and                                  Fax [phone]    TRANSTHORACIC ECHOCARDIOGRAM REPORT       Patient Name:      IRINA STRATTON     Reading Physician:    [id number] Garrett Daley MD  Study Date:        2/25/2024            Ordering Provider:    [id number] PASTOR HEARD  MRN/PID:           [id number]             Fellow:  Accession#:        [id number]         Nurse:  Date of Birth/Age: 1950 / 73 years Sonographer:          Sonam Martinez RDCS  Gender:            F                    Additional Staff:  Height:            167.00 cm            Admit Date:           2/24/2024  Weight:            56.00 kg             Admission Status:     Inpatient -                                                                Priority discharge  BSA / BMI:         1.62 m2 / 20.08      Encounter#:           [id number]                     kg/m2                                          Department  Location:  [facility]  Blood Pressure: 127 /59 mmHg    Study Type:    TRANSTHORACIC ECHO (TTE) COMPLETE  Diagnosis/ICD: Elevated Troponin-R79.89  Indication:    Elevated Troponin  CPT Code:      Echo Complete w Full Doppler-89399    Patient History:  Pertinent History: A-Fib, AAA and HTN.    Study Detail: The following Echo studies were performed: 2D, M-Mode, Doppler and                color flow. Technically challenging study due to prominent lung                artifact.       PHYSICIAN INTERPRETATION:  Left Ventricle: The left ventricular systolic function is low normal, with an estimated ejection fraction of 50%. There are no regional wall motion abnormalities. The left ventricular cavity size is normal. Spectral Doppler shows an impaired relaxation pattern of left ventricular diastolic filling.  Left Atrium: The left atrium is upper limits of normal in size.  Right Ventricle: The right ventricle is normal in size. There is normal right ventricular global systolic function.  Right Atrium: The right atrium is normal in size.  Aortic Valve: The aortic valve is trileaflet. There is evidence of mild aortic valve stenosis.  There is no evidence of aortic valve regurgitation. The peak instantaneous gradient of the aortic valve is 11.3 mmHg. The mean gradient of the aortic valve is 6.0 mmHg.  Mitral Valve: The mitral valve is normal in structure. There is mild mitral valve regurgitation.  Tricuspid Valve: The tricuspid valve is structurally normal. No evidence of tricuspid regurgitation.  Pulmonic Valve: The pulmonic valve is structurally normal. There is no indication of pulmonic valve regurgitation.  Pericardium: There is no pericardial effusion noted.  Aorta: The aortic root is normal.       CONCLUSIONS:   1. Left ventricular systolic function is low normal with a 50% estimated ejection fraction.   2. Spectral Doppler shows an impaired relaxation pattern of left ventricular diastolic filling.   3. Mild aortic  valve stenosis.    QUANTITATIVE DATA SUMMARY:  2D MEASUREMENTS:                            Normal Ranges:  IVSd:          1.05 cm    (0.6-1.1cm)  LVPWd:         1.02 cm    (0.6-1.1cm)  LVIDd:         4.78 cm    (3.9-5.9cm)  LVIDs:         3.27 cm  LV Mass Index: 109.0 g/m2  LV % FS        31.6 %    AORTA MEASUREMENTS:                     Normal Ranges:  Asc Ao, d: 3.20 cm (2.1-3.4cm)    LV SYSTOLIC FUNCTION BY 2D PLANIMETRY (MOD):                      Normal Ranges:  EF-A4C View: 52.0 % (>=55%)  EF-A2C View: 45.9 %  EF-Biplane:  49.7 %    LV DIASTOLIC FUNCTION:                         Normal Ranges:  MV Peak E:    0.93 m/s (0.7-1.2 m/s)  MV Peak A:    0.92 m/s (0.42-0.7 m/s)  E/A Ratio:    1.01     (1.0-2.2)  MV lateral e' 0.09 m/s  MV medial e'  0.08 m/s    MITRAL VALVE:                  Normal Ranges:  MV DT: 218 msec (150-240msec)    AORTIC VALVE:                                     Normal Ranges:  AoV Vmax:                1.68 m/s  (<=1.7m/s)  AoV Peak P.3 mmHg (<20mmHg)  AoV Mean P.0 mmHg  (1.7-11.5mmHg)  LVOT Max Oscar:            0.63 m/s  (<=1.1m/s)  AoV VTI:                 41.50 cm  (18-25cm)  LVOT VTI:                15.70 cm  LVOT Diameter:           2.20 cm   (1.8-2.4cm)  AoV Area, VTI:           1.44 cm2  (2.5-5.5cm2)  AoV Area,Vmax:           1.42 cm2  (2.5-4.5cm2)  AoV Dimensionless Index: 0.38       RIGHT VENTRICLE:  RV Basal 2.39 cm  RV Mid   1.60 cm  RV Major 7.0 cm  TAPSE:   20.5 mm  RV s'    0.16 m/s    TRICUSPID VALVE/RVSP:                              Normal Ranges:  Peak TR Velocity: 2.36 m/s  RV Syst Pressure: 25.3 mmHg (< 30mmHg)    PULMONIC VALVE:                       Normal Ranges:  PV Max Oscar: 0.8 m/s  (0.6-0.9m/s)  PV Max P.3 mmHg  PV Mean P.0 mmHg  PV VTI:     18.50 cm       [id number] Garrett Daley MD  Electronically signed on 2024 at 1:51:45 PM       ** Final **  In the review of systems is no fever but weakness lethargy no vomiting or  diarrhea    Physical Exam  Lungs are diminished but clear heart has a regular rate and rhythm abdomen is soft is not distended or firm  Relevant Results               Assessment/Plan          This patient has a urinary catheter   Reason for the urinary catheter remaining today? critically ill patient who need accurate urinary output measurements          Principal Problem:    UTI (urinary tract infection)  Active Problems:    Urinary tract infection associated with indwelling urethral catheter, initial encounter (CMS/[facility])          Malnutrition          I agree with the dietitian's malnutrition diagnosis.     For now I will prescribe Bactroban as well it is up each nares twice daily for 7 days for the positive swab urinary tract infection was not significant right lower lobe infiltrate Zosyn over the next day or 2 probable discharge be set up and repeat the labs as necessary continue with her other medications DVT prophylaxis and then add the Bactroban as well thank you    Javier Desouza DO

## 2024-02-26 NOTE — PROGRESS NOTES
2/26/2024   Met with pt in room, introduced self, pt forgetful.  Asked pt if I could call her , stated yes. Called and left voicemail for pt's spouse Jose.   Aminah Luna RN TCC

## 2024-02-26 NOTE — CONSULTS
“Wound” Ostomy Consultation        1. Chief Complaint: wound check and evaluation   2. History of Present Illness:   sacral pressure injury stage IV   3. Past Medical History: Reviewed   4. Past Surgical History: Reviewed  5. Allergies:     Reviewed  6. Social/Family History: Reviewed  7. Medications:  Reviewed  8. Labs/Data/Test Results: Reviewed   9. Progress Notes:  Reviewed     10. System Review: system review was performed with these findings:   Integumentary: Will be described below    11.  Physical Examination:   Constitutional: About stated age; No weight loss/fevers/chills.    Psych: Alert, oriented to person/place/time,  at bedside    Respiratory: Symmetrical expansion/effort; No cough/shortness of breath   Cardiovascular: No chest pain; No edema  Musculoskeletal: BLE contracted    Integumentary: Normal skin color, texture, and turgor, No dry/scaly/cracked skin; No ecchymotic areas; No friable skin; No rash/lesions/excoriation/burns; No diaphoresis; No jaundice, lorin, pallor skin; left hip[ and ischium 100% scar tissue   Ears: Normal limit hearing  Neck: Normal limit appearance/movements; Trachea midline;     12. Wound Pain/Discomfort: No     1. Wound Assessment  Type of Wound: pressure   Stage/Thickness Level: IV  Site: sacrum   Present on admission: yes   Tunneling: No    Undermining: No     Edges: pink,   Odor: none,   Edema: present   Exudate: sero/sang  Tissue Color: pale pink  Tissue Type: soft sponge like granulation, this is the exposed tissue - due to cavity - unable to visualize the rest of the wound base.   Carole Wound Tissue: No erythema or warmth, indurated tissue, macerated tissue, desiccated tissue, healthy scar tissue??????????????????????????  Carole Wound Tissue Color: pink,     Assessment/Plan/Treatment Recommendations:     Sacrum: irrigate with 5 ml of Dakin's full strength solution, apply Gentamicin 0.1% ointment to the visible opening of the ulcer and pack with a single layer of  4 cm of 1/4 ribbon packing, cover with foam, change 3 times a day   May apply foam to left hip and ischium to protect scarf tissue   Turn as per policy offloading from pressure sites(s)  Nutrition following   Interventions as per Tommy Scale are in place    Education provided; Treatment demonstrated; Questions answered  D/W RN / MD  Orders received     I have spent 30 minutes with the patient for physical and wound assessment, wound cleaning, dressing demonstration and answering questions. More than 50% of the time spent with the patient/ included education/counselling/coordination of care.     Medina Vincent RN WOCN

## 2024-02-26 NOTE — PROGRESS NOTES
Irina Stratton is a 73 y.o. female on day 2 of admission presenting with UTI (urinary tract infection).      Subjective   73 y.o. female with past medical history of Parkinson's, CKD, hypothyroidism, atrial fibrillation on Xarelto, diabetes, hypertension, hyperlipidemia, depression, MI, sacral osteomyelitis, and cholelithiasis,    Patient seen treating for UTI possible right lower lobe pneumonia on Zosyn and Vanco    Objective     Last Recorded Vitals  /67   Pulse 59   Temp 35.6 °C (96.1 °F)   Resp 18   Wt 56.7 kg (125 lb)   SpO2 93%   Intake/Output last 3 Shifts:    Intake/Output Summary (Last 24 hours) at 2/26/2024 1340  Last data filed at 2/26/2024 0529  Gross per 24 hour   Intake 150 ml   Output 750 ml   Net -600 ml       Admission Weight  Weight: 52 kg (114 lb 10.2 oz) (02/24/24 0300)    Daily Weight  02/26/24 : 56.7 kg (125 lb)    Image Results  Transthoracic Echo (TTE) [place] [address]Tel [phone] and                                  Fax [phone]    TRANSTHORACIC ECHOCARDIOGRAM REPORT       Patient Name:      IRINA STRATTON     Reading Physician:    [id number] Garrett Daley MD  Study Date:        2/25/2024            Ordering Provider:    [id number] PASTOR HEARD  MRN/PID:           [id number]             Fellow:  Accession#:        [id number]         Nurse:  Date of Birth/Age: 1950 / 73 years Sonographer:          Sonam Martinez RDCS  Gender:            F                    Additional Staff:  Height:            167.00 cm            Admit Date:           2/24/2024  Weight:            56.00 kg             Admission Status:     Inpatient -                                                                Priority discharge  BSA / BMI:         1.62 m2 / 20.08      Encounter#:           [id number]                      kg/m2                                          Department Location:  [facility]  Blood Pressure: 127 /59 mmHg    Study Type:    TRANSTHORACIC ECHO (TTE) COMPLETE  Diagnosis/ICD: Elevated Troponin-R79.89  Indication:    Elevated Troponin  CPT Code:      Echo Complete w Full Doppler-17300    Patient History:  Pertinent History: A-Fib, AAA and HTN.    Study Detail: The following Echo studies were performed: 2D, M-Mode, Doppler and                color flow. Technically challenging study due to prominent lung                artifact.       PHYSICIAN INTERPRETATION:  Left Ventricle: The left ventricular systolic function is low normal, with an estimated ejection fraction of 50%. There are no regional wall motion abnormalities. The left ventricular cavity size is normal. Spectral Doppler shows an impaired relaxation pattern of left ventricular diastolic filling.  Left Atrium: The left atrium is upper limits of normal in size.  Right Ventricle: The right ventricle is normal in size. There is normal right ventricular global systolic function.  Right Atrium: The right atrium is normal in size.  Aortic Valve: The aortic valve is trileaflet. There is evidence of mild aortic valve stenosis.  There is no evidence of aortic valve regurgitation. The peak instantaneous gradient of the aortic valve is 11.3 mmHg. The mean gradient of the aortic valve is 6.0 mmHg.  Mitral Valve: The mitral valve is normal in structure. There is mild mitral valve regurgitation.  Tricuspid Valve: The tricuspid valve is structurally normal. No evidence of tricuspid regurgitation.  Pulmonic Valve: The pulmonic valve is structurally normal. There is no indication of pulmonic valve regurgitation.  Pericardium: There is no pericardial effusion noted.  Aorta: The aortic root is normal.       CONCLUSIONS:   1. Left ventricular systolic function is low normal with a 50% estimated ejection fraction.   2. Spectral Doppler shows an impaired relaxation pattern  of left ventricular diastolic filling.   3. Mild aortic valve stenosis.    QUANTITATIVE DATA SUMMARY:  2D MEASUREMENTS:                            Normal Ranges:  IVSd:          1.05 cm    (0.6-1.1cm)  LVPWd:         1.02 cm    (0.6-1.1cm)  LVIDd:         4.78 cm    (3.9-5.9cm)  LVIDs:         3.27 cm  LV Mass Index: 109.0 g/m2  LV % FS        31.6 %    AORTA MEASUREMENTS:                     Normal Ranges:  Asc Ao, d: 3.20 cm (2.1-3.4cm)    LV SYSTOLIC FUNCTION BY 2D PLANIMETRY (MOD):                      Normal Ranges:  EF-A4C View: 52.0 % (>=55%)  EF-A2C View: 45.9 %  EF-Biplane:  49.7 %    LV DIASTOLIC FUNCTION:                         Normal Ranges:  MV Peak E:    0.93 m/s (0.7-1.2 m/s)  MV Peak A:    0.92 m/s (0.42-0.7 m/s)  E/A Ratio:    1.01     (1.0-2.2)  MV lateral e' 0.09 m/s  MV medial e'  0.08 m/s    MITRAL VALVE:                  Normal Ranges:  MV DT: 218 msec (150-240msec)    AORTIC VALVE:                                     Normal Ranges:  AoV Vmax:                1.68 m/s  (<=1.7m/s)  AoV Peak P.3 mmHg (<20mmHg)  AoV Mean P.0 mmHg  (1.7-11.5mmHg)  LVOT Max Oscar:            0.63 m/s  (<=1.1m/s)  AoV VTI:                 41.50 cm  (18-25cm)  LVOT VTI:                15.70 cm  LVOT Diameter:           2.20 cm   (1.8-2.4cm)  AoV Area, VTI:           1.44 cm2  (2.5-5.5cm2)  AoV Area,Vmax:           1.42 cm2  (2.5-4.5cm2)  AoV Dimensionless Index: 0.38       RIGHT VENTRICLE:  RV Basal 2.39 cm  RV Mid   1.60 cm  RV Major 7.0 cm  TAPSE:   20.5 mm  RV s'    0.16 m/s    TRICUSPID VALVE/RVSP:                              Normal Ranges:  Peak TR Velocity: 2.36 m/s  RV Syst Pressure: 25.3 mmHg (< 30mmHg)    PULMONIC VALVE:                       Normal Ranges:  PV Max Oscar: 0.8 m/s  (0.6-0.9m/s)  PV Max P.3 mmHg  PV Mean P.0 mmHg  PV VTI:     18.50 cm       [id number] Garrett Daley MD  Electronically signed on 2024 at 1:51:45 PM       ** Final **  The review of systems no  fever weakness lethargy    Physical Exam  Lungs are clear heart is regular rate and rhythm abdomen is soft  Relevant Results               Assessment/Plan          This patient has a urinary catheter   Reason for the urinary catheter remaining today?           Principal Problem:    UTI (urinary tract infection)  Active Problems:    Urinary tract infection associated with indwelling urethral catheter, initial encounter (CMS/[facility])          Malnutrition          I agree with the dietitian's malnutrition diagnosis.     For now treat both UTI and pneumonia maintain oxygen saturations PT and OT review the labs tomorrow and continue all other present care and therapy thank you    Javier Desouza DO

## 2024-02-26 NOTE — PROGRESS NOTES
02/26/24 1055   Discharge Planning   Living Arrangements Spouse/significant other   Support Systems Spouse/significant other;Children   Assistance Needed bedbound   Type of Residence Private residence   Home or Post Acute Services In home services   Type of Home Care Services Home nursing visits   Patient expects to be discharged to: return shara with [facility]     2/26/2024  Met with spouse in room, introduced self and explained role.  Verified insurance, address, phone.   PCP- Dr TORY Rojo  Pt is from Home, lives with spouse.  Pt is bedbound. Does not ambulate or bear wt.   They have a hospital bed, BSC, transport wheelchair.  stated he and his daughter care for the pt- they bathe and dress her. (Spouse lifts her to the Bedside commode). Pt has a albarran. Spouse feeds pt. Stated she is able to drink on her own- has a water bottle. Stated she tends to drink fast and then coughs. (RN notified).  and daughter take pt to her appointments.  Stated he lifts her to her wheelchair and then into the car.   Stated they are active with [facility] for the albarran and for wound care- sacrum and L hip.   Per - would like for pt to return home upon discharge with resumption of [facility].  Stated he will transport upon discharge. CT team will continue to follow.  Aminah Luna RN TCC

## 2024-02-26 NOTE — CONSULTS
"Nutrition Initial Assessment:   Nutrition Assessment    Reason for Assessment: Admission nursing screening, Dietitian discretion (missed MST=3 for weight loss and decreased po intakes; wound)    Patient is a 73 y.o. female presenting with: UTI, lethargy; sepsis suspected 2/2 pneumonia vs UTI    PmHx:  parkinsons, CKD, hypothyroidism, afib, DM, HTN, HLD, MI, depression, sacral osteomyelitis      Nutrition History:  Food and Nutrient History: Intakes not established yet.  Pt laying in bed, crying out.  Eyes closed. Pt noted to be confused.  Vitamin/Herbal Supplement Use: vitamin D  Food Allergies/Intolerances:  None  GI Symptoms: None  Oral Problems:  ANA       Anthropometrics:  Height: 167.6 cm (5' 5.98\")   Weight: 56.7 kg (125 lb)   BMI (Calculated): 20.19  IBW/kg (Dietitian Calculated): 59.1 kg  Percent of IBW: 96 %       Weight History:     Weight Change %:  Weight History / % Weight Change: 1/16 47.6kg, 12/12 47.6kg, 10/20 49.9kg, 7/24 52.2kg, 3/27 54.9kg  (unsure if current EMR weight is correct compared to hx) reweigh pt    Nutrition Focused Physical Exam Findings:  defer: confused, not appropriate; does appear thin  Subcutaneous Fat Loss:   Orbital Fat Pads: Defer  Muscle Wasting:     Edema:  Edema: none  Physical Findings:  Skin: Positive (stage IV sacrum, L hip stage 1)    Nutrition Significant Labs:  CBC Trend:   Results from last 7 days   Lab Units 02/25/24  0449 02/23/24  2259   WBC AUTO x10*3/uL 8.0 12.0*   RBC AUTO x10*6/uL 3.34* 3.93*   HEMOGLOBIN g/dL 9.2* 10.9*   HEMATOCRIT % 33.2* 36.4   MCV fL 99 93   PLATELETS AUTO x10*3/uL 213 232    , BMP Trend:   Results from last 7 days   Lab Units 02/25/24  0449 02/23/24  2259   GLUCOSE mg/dL 80 166*   CALCIUM mg/dL 8.4* 9.1   SODIUM mmol/L 138 135*   POTASSIUM mmol/L 3.7 4.1   CO2 mmol/L 24 27   CHLORIDE mmol/L 105 100   BUN mg/dL 22 29*   CREATININE mg/dL 0.87 0.80    , A1C:  Lab Results   Component Value Date    HGBA1C 6.6 (H) 01/23/2024   , BG POCT trend: "   Results from last 7 days   Lab Units 02/26/24  1122 02/26/24  0624 02/25/24  1951 02/25/24  1638 02/25/24  1440   POCT GLUCOSE mg/dL 143* 87 115* 84 89        Nutrition Specific Medications:  Reviewed     I/O:   Last BM Date: 02/25/24; Stool Appearance: Soft, Formed (large bm) (02/25/24 0228)        Dietary Orders (From admission, onward)       Start     Ordered    02/26/24 0925  Oral nutritional supplements  Until discontinued        Question Answer Comment   Deliver with Dinner    Deliver with Lunch    Select supplement: Elieser        02/26/24 0925    02/26/24 0925  Oral nutritional supplements  Until discontinued        Comments: chocolate   Question Answer Comment   Deliver with All meals    Select supplement: Glucerna Shake        02/26/24 0925 02/25/24 1000  Adult diet Carb Controlled; 60 gram carb/meal, 30 gram Carb evening snack  Diet effective now        Question Answer Comment   Diet type Carb Controlled    Carb diet selection: 60 gram carb/meal, 30 gram Carb evening snack        02/25/24 0959                     Estimated Needs:      Method for Estimating Needs: 1600-1800kcals (27-30kcals/kg IBW)     Method for Estimating Needs: 82-106g (1.4-1.8g/kg IBW)     Method for Estimating Needs: 1 mL/kcal or as per MD        Nutrition Diagnosis   Malnutrition Diagnosis  Patient has Malnutrition Diagnosis: No (unable to determine at this time.  Likely low threshold)    Nutrition Diagnosis  Patient has Nutrition Diagnosis: Yes  Diagnosis Status (1): New  Nutrition Diagnosis 1: Predicted inadequate energy intake  Related to (1): acute on chronic illness  As Evidenced by (1): MST reports decreased po intakes, altered mental status  Additional Nutrition Diagnosis: Diagnosis 2  Diagnosis Status (2): New  Nutrition Diagnosis 2: Increased nutrient needs  Related to (2): physiological causes increasing nutrient needs  As Evidenced by (2): wound healing needs       Nutrition Interventions/Recommendations          Nutrition Prescription:  Individualized Nutrition Prescription Provided for : 60gm carb controlled diet with Glucerna three times daily and Elieser twice daily        Nutrition Interventions:   Interventions: Meals and snacks, Medical food supplement  Meals and Snacks: Carbohydrate-modified diet  Goal: consume >50-75% of meals  Medical Food Supplement: Commercial beverage  Goal: consume 100% Elieser twice daily (for an additional 90 kcals, 2.5 gm protein, supplement glutamine and arginine) and Glucerna shakes three time daily (for an additional 220kcals, 10gm protein each)  Additional Interventions: consider daily MVI with minerals         Nutrition Education:   Not appropriate at this time        Nutrition Monitoring and Evaluation   Food/Nutrient Related History Monitoring  Monitoring and Evaluation Plan: Energy intake, Fluid intake, Amount of food  Energy Intake: Estimated energy intake  Criteria: Meal/ONS intake to meet >75% of estimated needs  Fluid Intake: Estimated fluid intake  Criteria: fluid intake to meet >75% of estimated need  Amount of Food: Estimated amout of food, Medical food intake  Criteria: Pt to consume >75% of meals/ONS    Body Composition/Growth/Weight History  Monitoring and Evaluation Plan: Weight  Weight: Measured weight  Criteria: reweigh now then at least every 5 days    Biochemical Data, Medical Tests and Procedures  Monitoring and Evaluation Plan: Electrolyte/renal panel, Glucose/endocrine profile  Criteria: Labs wnl    Nutrition Focused Physical Findings  Monitoring and Evaluation Plan: Skin  Criteria: promote wound healing       Time Spent/Follow-up Reminder:   Time Spent (min): 45 minutes  Last Date of Nutrition Visit: 02/26/24  Nutrition Follow-Up Needed?: 3-5 days  Follow up Comment: 2/29-3/1/24 ANALI

## 2024-02-26 NOTE — PROGRESS NOTES
Occupational Therapy    Occupational Therapy    Evaluation    Patient Name: Irina Stratton  MRN: [id number]  Today's Date: 2/26/2024  Time Calculation  Start Time: 1033  Stop Time: 1049  Time Calculation (min): 16 min    Assessment  IP OT Assessment  Prognosis: Poor  Medical Staff Made Aware: Yes  End of Session Communication: Bedside nurse  End of Session Patient Position: Bed, 3 rail up, Alarm on (spouse present)    Plan:  No Skilled OT: At baseline function  OT Discharge Recommendations:  (24 hr. care)  OT - OK to Discharge: Yes (when cleared by medical team)    Subjective     Current Problem:  1. Urinary tract infection associated with indwelling urethral catheter, initial encounter (CMS/[facility])        2. Acute encephalopathy        3. Sepsis without acute organ dysfunction, due to unspecified organism (CMS/[facility])        4. Elevated brain natriuretic peptide (BNP) level  Transthoracic Echo (TTE) Complete    Transthoracic Echo (TTE) Complete          General:  General  Reason for Referral: OT eval and treat- Pt. is a 73 y.o. female with past medical history of Parkinson's, CKD, hypothyroidism, atrial fibrillation on Xarelto, diabetes, hypertension, hyperlipidemia, depression, MI, sacral osteomyelitis, and cholelithiasis, who [facility] today via EMS from home for lethargy. EMS stated she had a fever en route. Patient has a albarran catheter that was recently changed on Wednesday.  stated patient has been more lethargic than normal.  Referred By: Dinah Bower CNP  Family/Caregiver Present:  (spouse present after evaluation)  Prior to Session Communication: Bedside nurse (cleared for evaluation per RN)  Patient Position Received: Bed, 2 rail up, Alarm on  General Comment: knees flexed and drawn up to pt.    Precautions:  Medical Precautions:  (chronic albarran catheter)  Precautions Comment: Fall precautions, B LE contractures, stage 4 sacral wound    Pain:  Pain Assessment  Pain Score:  (ble pain,  not rated)    Objective     Cognition:  Overall Cognitive Status:  (A+O x 1, able to follow some commands)    Home Living:  Home Adaptive Equipment:  (transport WC, stair glide, commode)  Home Living Comments: Pt lives at home with her  with garage entrance and stair lift to main floor.     Prior Function:  Level of [place]:  (Pt is dependent for mobility, to transfer to  pt requires a dependent lift of 2 people which her  has assist from their daughter. Pt. needs assist with feeding/grooming/bathing/dressing/toileting from spouse/dtr.)  Hand Dominance: Right  Prior Function Comments: pt. is essentially bedbound unless she needs to go to an outside appt. or she transfers to [place] or [facility]. pt. is non-ambulatory. Pt. has HHC 3x/week for wound care.    IADL History:  Homemaking Responsibilities: No  IADL Comments: all homemaking/driving/shopping performed by spouse.    ADL:  ADL Comments: pt. washed face with sba, combed hair with max. assist. dependent with bathing/dressing/toileting. anticipate mod. to max. assist with feeding.    Bed Mobility/Transfers:   Bed Mobility  Bed Mobility:  (pt. is dependent of 2 for rolling to left and to right, but is able to grasp onto bedrail each direction.)    Strength:  Strength Comments: aarom of bilat. shoulder flexion to ~50 deg., lacking full elbow extension, bilat.  arom wfl.    Outcome Measures: [facility] Daily Activity  Putting on and taking off regular lower body clothing: Total  Bathing (including washing, rinsing, drying): Total  Putting on and taking off regular upper body clothing: Total  Toileting, which includes using toilet, bedpan or urinal: Total  Taking care of personal grooming such as brushing teeth: A lot  Eating Meals: A lot  Daily Activity - Total Score: 8    EDUCATION:       Goals: N/A

## 2024-02-26 NOTE — PROGRESS NOTES
Physical Therapy    Physical Therapy Evaluation    Patient Name: Irina Stratton  MRN: [id number]  Today's Date: 2/26/2024   Time Calculation  Start Time: 1035  Stop Time: 1050  Time Calculation (min): 15 min    Assessment/Plan   PT Assessment  End of Session Communication: Bedside nurse, Care Coordinator  End of Session Patient Position: Bed, 2 rail up, Alarm on (All needs in reach, no complaints noted)  IP OR SWING BED PT PLAN  Inpatient or Swing Bed: Inpatient  PT Plan  PT Plan: PT Eval only  PT Eval Only Reason: At baseline function  PT Frequency: PT eval only  PT Discharge Recommendations: 24 hr supervision due to cognition  PT - OK to Discharge: Yes (once cleared by medical team)    Subjective     Current Problem:  Patient Active Problem List   Diagnosis    Paroxysmal atrial fibrillation (CMS/HCC)    AAA (abdominal aortic aneurysm) (CMS/HCC)    Bacteremia    Bilateral carotid artery stenosis    Carotid bruit    Chronic kidney disease, stage 4 (severe) (CMS/HCC)    Coronary atherosclerosis    Degenerative spondylolisthesis    DM (diabetes mellitus), type 1 (CMS/HCC)    Facet degeneration of lumbar region    H/O heart artery stent    Hypertension    Hyperlipidemia    Hypothyroidism    Lower extremity edema    Lumbar herniated disc    Lumbar radiculopathy    Lumbar stenosis with neurogenic claudication    PAD (peripheral artery disease) (CMS/HCC)    Carotid stenosis    Renal artery stenosis (CMS/HCC)    Saccular aneurysm    Sacroiliitis (CMS/HCC)    Ulcer of right foot (CMS/HCC)    Abnormality of albumin    Acidosis, unspecified    Anemia, unspecified    Benign secondary hypertension due to renal artery stenosis (CMS/HCC)    Cyst of kidney, acquired    Depression, unspecified    Elevated white blood cell count, unspecified    Emphysema, unspecified (CMS/HCC)    Hypokalemia    Insomnia, unspecified    Left bundle-branch block, unspecified    Low back pain, unspecified    Tobacco use    Obstructive and reflux  uropathy, unspecified    Occlusion and stenosis of unspecified carotid artery    Other cholelithiasis without obstruction    Pressure ulcer of sacral region, stage 4 (CMS/[facility])    Unspecified osteoarthritis, unspecified site    Unspecified protein-calorie malnutrition (CMS/[facility])    Old myocardial infarction    Sacral osteomyelitis (CMS/[facility])    UTI (urinary tract infection)    Urinary tract infection associated with indwelling urethral catheter, initial encounter (CMS/[facility])     General Visit Information:  General  Reason for Referral: PT Eval and Treat  Referred By: Dinah Bower, ACE-CNP  Past Medical History Relevant to Rehab: 73 y.o. female with past medical history of Parkinson's, CKD, hypothyroidism, atrial fibrillation on Xarelto, diabetes, hypertension, hyperlipidemia, depression, MI, sacral osteomyelitis, and cholelithiasis, who presented to [facility] ED today via EMS from home for lethargy. EMS stated she had a fever en route. Patient has a ablarran catheter that was recently changed on Wednesday.  stated patient has been more lethargic than normal.  Prior to Session Communication: Bedside nurse  Patient Position Received: Bed, 2 rail up, Alarm on (Pt agreeable to PT)    Home Living:  Home Living  Home Adaptive Equipment:  (transport , stair glide, commode)  Home Living Comments: Pt lives at home with her  with garage entrance and stair lift to main floor.    Prior Level of Function:  Prior Function Per Pt/Caregiver Report  Level of [place]: Needs assistance with ADLs, Needs assistance with homemaking (Pt is dependent for mobility, to transfer to  pt requires a dependent lift of 2 people which her  has assist from their daughter)    Precautions:  Precautions  Precautions Comment: Fall precautions, B LE contractures, stage 4 sacral wound    Objective     Pain:  Pain Assessment  Pain Assessment:  (B LE pain, unable to quantify, RN aware)    Cognition:  Cognition  Overall Cognitive  Status: Impaired at baseline (A+O x 1, able to follow some commands)    General Assessments:  Sensation  Light Touch: No apparent deficits  Strength  Strength Comments: B LE hip and knee fllexion contractures, unable to assess strength    Functional Assessments:  Bed Mobility  Bed Mobility:  (Pt is dependent x 2 for log rolling bilaterally, is also dependent for all other mobility.)    Outcome Measures:  [facility] Basic Mobility  Turning from your back to your side while in a flat bed without using bedrails: Total  Moving from lying on your back to sitting on the side of a flat bed without using bedrails: Total  Moving to and from bed to chair (including a wheelchair): Total  Standing up from a chair using your arms (e.g. wheelchair or bedside chair): Total  To walk in hospital room: Total  Climbing 3-5 steps with railing: Total  Basic Mobility - Total Score: 6    Education Documentation  No documentation found.  Education Comments  No comments found.

## 2024-02-27 NOTE — PROGRESS NOTES
Irina Stratton is a 73 y.o. female on day 3 of admission presenting with UTI (urinary tract infection).      Subjective   Seen today she is feeling better she has a stage IV sacral decubitus staph positive in her nares on applying Bactroban twice daily for 7 days she is being treated for urinary tract infection as well but she has no fever and she is doing well at this point       Objective     Last Recorded Vitals  /79 (BP Location: Left arm)   Pulse 75   Temp 36.6 °C (97.9 °F) (Temporal)   Resp 16   Wt 56.7 kg (125 lb)   SpO2 93%   Intake/Output last 3 Shifts:    Intake/Output Summary (Last 24 hours) at 2/27/2024 1327  Last data filed at 2/27/2024 1241  Gross per 24 hour   Intake 1065 ml   Output 2500 ml   Net -1435 ml       Admission Weight  Weight: 52 kg (114 lb 10.2 oz) (02/24/24 0300)    Daily Weight  02/26/24 : 56.7 kg (125 lb)    Image Results  Transthoracic Echo (TTE) [facility], [address] [place], [place] [address]Tel [phone] and                                  Fax [phone]    TRANSTHORACIC ECHOCARDIOGRAM REPORT       Patient Name:      IRINA STRATTON     Reading Physician:    [id number] Garrett Daley MD  Study Date:        2/25/2024            Ordering Provider:    [id number] PASTOR HEARD  MRN/PID:           [id number]             Fellow:  Accession#:        [id number]         Nurse:  Date of Birth/Age: 1950 / 73 years Sonographer:          Sonam Martinez RDCS  Gender:            F                    Additional Staff:  Height:            167.00 cm            Admit Date:           2/24/2024  Weight:            56.00 kg             Admission Status:     Inpatient -                                                                Priority discharge  BSA / BMI:         1.62 m2 / 20.08      Encounter#:           [id number]                      kg/m2                                          Department Location:  [facility]  Blood Pressure: 127 /59 mmHg    Study Type:    TRANSTHORACIC ECHO (TTE) COMPLETE  Diagnosis/ICD: Elevated Troponin-R79.89  Indication:    Elevated Troponin  CPT Code:      Echo Complete w Full Doppler-46817    Patient History:  Pertinent History: A-Fib, AAA and HTN.    Study Detail: The following Echo studies were performed: 2D, M-Mode, Doppler and                color flow. Technically challenging study due to prominent lung                artifact.       PHYSICIAN INTERPRETATION:  Left Ventricle: The left ventricular systolic function is low normal, with an estimated ejection fraction of 50%. There are no regional wall motion abnormalities. The left ventricular cavity size is normal. Spectral Doppler shows an impaired relaxation pattern of left ventricular diastolic filling.  Left Atrium: The left atrium is upper limits of normal in size.  Right Ventricle: The right ventricle is normal in size. There is normal right ventricular global systolic function.  Right Atrium: The right atrium is normal in size.  Aortic Valve: The aortic valve is trileaflet. There is evidence of mild aortic valve stenosis.  There is no evidence of aortic valve regurgitation. The peak instantaneous gradient of the aortic valve is 11.3 mmHg. The mean gradient of the aortic valve is 6.0 mmHg.  Mitral Valve: The mitral valve is normal in structure. There is mild mitral valve regurgitation.  Tricuspid Valve: The tricuspid valve is structurally normal. No evidence of tricuspid regurgitation.  Pulmonic Valve: The pulmonic valve is structurally normal. There is no indication of pulmonic valve regurgitation.  Pericardium: There is no pericardial effusion noted.  Aorta: The aortic root is normal.       CONCLUSIONS:   1. Left ventricular systolic function is low normal with a 50% estimated ejection fraction.   2. Spectral Doppler shows an impaired relaxation pattern  of left ventricular diastolic filling.   3. Mild aortic valve stenosis.    QUANTITATIVE DATA SUMMARY:  2D MEASUREMENTS:                            Normal Ranges:  IVSd:          1.05 cm    (0.6-1.1cm)  LVPWd:         1.02 cm    (0.6-1.1cm)  LVIDd:         4.78 cm    (3.9-5.9cm)  LVIDs:         3.27 cm  LV Mass Index: 109.0 g/m2  LV % FS        31.6 %    AORTA MEASUREMENTS:                     Normal Ranges:  Asc Ao, d: 3.20 cm (2.1-3.4cm)    LV SYSTOLIC FUNCTION BY 2D PLANIMETRY (MOD):                      Normal Ranges:  EF-A4C View: 52.0 % (>=55%)  EF-A2C View: 45.9 %  EF-Biplane:  49.7 %    LV DIASTOLIC FUNCTION:                         Normal Ranges:  MV Peak E:    0.93 m/s (0.7-1.2 m/s)  MV Peak A:    0.92 m/s (0.42-0.7 m/s)  E/A Ratio:    1.01     (1.0-2.2)  MV lateral e' 0.09 m/s  MV medial e'  0.08 m/s    MITRAL VALVE:                  Normal Ranges:  MV DT: 218 msec (150-240msec)    AORTIC VALVE:                                     Normal Ranges:  AoV Vmax:                1.68 m/s  (<=1.7m/s)  AoV Peak P.3 mmHg (<20mmHg)  AoV Mean P.0 mmHg  (1.7-11.5mmHg)  LVOT Max Oscar:            0.63 m/s  (<=1.1m/s)  AoV VTI:                 41.50 cm  (18-25cm)  LVOT VTI:                15.70 cm  LVOT Diameter:           2.20 cm   (1.8-2.4cm)  AoV Area, VTI:           1.44 cm2  (2.5-5.5cm2)  AoV Area,Vmax:           1.42 cm2  (2.5-4.5cm2)  AoV Dimensionless Index: 0.38       RIGHT VENTRICLE:  RV Basal 2.39 cm  RV Mid   1.60 cm  RV Major 7.0 cm  TAPSE:   20.5 mm  RV s'    0.16 m/s    TRICUSPID VALVE/RVSP:                              Normal Ranges:  Peak TR Velocity: 2.36 m/s  RV Syst Pressure: 25.3 mmHg (< 30mmHg)    PULMONIC VALVE:                       Normal Ranges:  PV Max Oscar: 0.8 m/s  (0.6-0.9m/s)  PV Max P.3 mmHg  PV Mean P.0 mmHg  PV VTI:     18.50 cm       [id number] Garrett Daley MD  Electronically signed on 2024 at 1:51:45 PM       ** Final **  In the review of systems is  weakness no fever lethargy    Physical Exam  Lungs are clear heart has regular rate and rhythm  Relevant Results               Assessment/Plan        So far treat the staph in her nares she has a sacral pressure wound urinary tract infection but overall she is getting better continue with the same present care and therapy thank you          Principal Problem:    UTI (urinary tract infection)  Active Problems:    Urinary tract infection associated with indwelling urethral catheter, initial encounter (CMS/[facility])          Malnutrition          I agree with the dietitian's malnutrition diagnosis.         Javier Desouza, DO

## 2024-02-28 NOTE — CARE PLAN
The patient's goals for the shift include      The clinical goals for the shift include maintain safety    Wound cleaned and changed per orders, completed ROM on patients extremities. R leg contracted and causes pain. Other extremities passive ROM   Problem: Pain  Goal: Takes deep breaths with improved pain control throughout the shift  2/28/2024 1407 by Tresa Jones RN  Outcome: Progressing  2/28/2024 1407 by Tresa Jones RN  Outcome: Progressing     Problem: Pain  Goal: Turns in bed with improved pain control throughout the shift  2/28/2024 1407 by Tresa Jones RN  Outcome: Progressing  2/28/2024 1407 by Tresa Jones RN  Outcome: Progressing     Problem: Diabetes  Goal: Increase stability of blood glucose readings by end of shift  2/28/2024 1407 by Tresa Jones RN  Outcome: Progressing  2/28/2024 1407 by Tresa Jones RN  Outcome: Progressing

## 2024-02-28 NOTE — CARE PLAN
The patient's goals for the shift include  safety    The clinical goals for the shift include patient to remain free from falls throughout this shift    Problem: Skin  Goal: Decreased wound size/increased tissue granulation at next dressing change  Flowsheets (Taken 2/28/2024 0053)  Decreased wound size/increased tissue granulation at next dressing change:   Promote sleep for wound healing   Protective dressings over bony prominences  Goal: Participates in plan/prevention/treatment measures  Flowsheets (Taken 2/28/2024 0053)  Participates in plan/prevention/treatment measures: Elevate heels  Goal: Prevent/manage excess moisture  Flowsheets (Taken 2/28/2024 0053)  Prevent/manage excess moisture:   Cleanse incontinence/protect with barrier cream   Monitor for/manage infection if present  Goal: Prevent/minimize sheer/friction injuries  Flowsheets (Taken 2/28/2024 0053)  Prevent/minimize sheer/friction injuries:   Use pull sheet   HOB 30 degrees or less   Turn/reposition every 2 hours/use positioning/transfer devices  Goal: Promote/optimize nutrition  Flowsheets (Taken 2/28/2024 0053)  Promote/optimize nutrition:   Assist with feeding   Monitor/record intake including meals  Goal: Promote skin healing  Flowsheets (Taken 2/28/2024 0053)  Promote skin healing:   Assess skin/pad under line(s)/device(s)   Turn/reposition every 2 hours/use positioning/transfer devices

## 2024-02-28 NOTE — PROGRESS NOTES
Irina Stratton is a 73 y.o. female on day 4 of admission presenting with UTI (urinary tract infection).      Subjective   Patient fully evaluated on February 28 and clinically improved.       Objective     Last Recorded Vitals  /60   Pulse 72   Temp 36.7 °C (98.1 °F)   Resp 16   Wt 56.7 kg (125 lb)   SpO2 92%   Intake/Output last 3 Shifts:    Intake/Output Summary (Last 24 hours) at 2/28/2024 1610  Last data filed at 2/28/2024 1130  Gross per 24 hour   Intake 200 ml   Output 1325 ml   Net -1125 ml       Admission Weight  Weight: 52 kg (114 lb 10.2 oz) (02/24/24 0300)    Daily Weight  02/26/24 : 56.7 kg (125 lb)    Image Results  Transthoracic Echo (TTE) Complete      [facility], [address] [place] [id number] [phone] and                                  Fax [phone]    TRANSTHORACIC ECHOCARDIOGRAM REPORT       Patient Name:      IRINA STRATTON     Reading Physician:    [id number] Garrett Daley MD  Study Date:        2/25/2024            Ordering Provider:    [id number] PASTOR HEARD  MRN/PID:           [id number]             Fellow:  Accession#:        [id number]         Nurse:  Date of Birth/Age: 1950 / 73 years Sonographer:          Sonam Martinez RDCS  Gender:            F                    Additional Staff:  Height:            167.00 cm            Admit Date:           2/24/2024  Weight:            56.00 kg             Admission Status:     Inpatient -                                                                Priority discharge  BSA / BMI:         1.62 m2 / 20.08      Encounter#:           [id number]                     kg/m2                                          Department Location:  [facility]  Blood Pressure: 127 /59 mmHg    Study Type:    TRANSTHORACIC ECHO (TTE) COMPLETE  Diagnosis/ICD: Elevated Troponin-R79.89  Indication:     Elevated Troponin  CPT Code:      Echo Complete w Full Doppler-[id number]    Patient History:  Pertinent History: A-Fib, AAA and HTN.    Study Detail: The following Echo studies were performed: 2D, M-Mode, Doppler and                color flow. Technically challenging study due to prominent lung                artifact.       PHYSICIAN INTERPRETATION:  Left Ventricle: The left ventricular systolic function is low normal, with an estimated ejection fraction of 50%. There are no regional wall motion abnormalities. The left ventricular cavity size is normal. Spectral Doppler shows an impaired relaxation pattern of left ventricular diastolic filling.  Left Atrium: The left atrium is upper limits of normal in size.  Right Ventricle: The right ventricle is normal in size. There is normal right ventricular global systolic function.  Right Atrium: The right atrium is normal in size.  Aortic Valve: The aortic valve is trileaflet. There is evidence of mild aortic valve stenosis.  There is no evidence of aortic valve regurgitation. The peak instantaneous gradient of the aortic valve is 11.3 mmHg. The mean gradient of the aortic valve is 6.0 mmHg.  Mitral Valve: The mitral valve is normal in structure. There is mild mitral valve regurgitation.  Tricuspid Valve: The tricuspid valve is structurally normal. No evidence of tricuspid regurgitation.  Pulmonic Valve: The pulmonic valve is structurally normal. There is no indication of pulmonic valve regurgitation.  Pericardium: There is no pericardial effusion noted.  Aorta: The aortic root is normal.       CONCLUSIONS:   1. Left ventricular systolic function is low normal with a 50% estimated ejection fraction.   2. Spectral Doppler shows an impaired relaxation pattern of left ventricular diastolic filling.   3. Mild aortic valve stenosis.    QUANTITATIVE DATA SUMMARY:  2D MEASUREMENTS:                            Normal Ranges:  IVSd:          1.05 cm    (0.6-1.1cm)  LVPWd:         1.02  cm    (0.6-1.1cm)  LVIDd:         4.78 cm    (3.9-5.9cm)  LVIDs:         3.27 cm  LV Mass Index: 109.0 g/m2  LV % FS        31.6 %    AORTA MEASUREMENTS:                     Normal Ranges:  Asc Ao, d: 3.20 cm (2.1-3.4cm)    LV SYSTOLIC FUNCTION BY 2D PLANIMETRY (MOD):                      Normal Ranges:  EF-A4C View: 52.0 % (>=55%)  EF-A2C View: 45.9 %  EF-Biplane:  49.7 %    LV DIASTOLIC FUNCTION:                         Normal Ranges:  MV Peak E:    0.93 m/s (0.7-1.2 m/s)  MV Peak A:    0.92 m/s (0.42-0.7 m/s)  E/A Ratio:    1.01     (1.0-2.2)  MV lateral e' 0.09 m/s  MV medial e'  0.08 m/s    MITRAL VALVE:                  Normal Ranges:  MV DT: 218 msec (150-240msec)    AORTIC VALVE:                                     Normal Ranges:  AoV Vmax:                1.68 m/s  (<=1.7m/s)  AoV Peak P.3 mmHg (<20mmHg)  AoV Mean P.0 mmHg  (1.7-11.5mmHg)  LVOT Max Oscar:            0.63 m/s  (<=1.1m/s)  AoV VTI:                 41.50 cm  (18-25cm)  LVOT VTI:                15.70 cm  LVOT Diameter:           2.20 cm   (1.8-2.4cm)  AoV Area, VTI:           1.44 cm2  (2.5-5.5cm2)  AoV Area,Vmax:           1.42 cm2  (2.5-4.5cm2)  AoV Dimensionless Index: 0.38       RIGHT VENTRICLE:  RV Basal 2.39 cm  RV Mid   1.60 cm  RV Major 7.0 cm  TAPSE:   20.5 mm  RV s'    0.16 m/s    TRICUSPID VALVE/RVSP:                              Normal Ranges:  Peak TR Velocity: 2.36 m/s  RV Syst Pressure: 25.3 mmHg (< 30mmHg)    PULMONIC VALVE:                       Normal Ranges:  PV Max Oscar: 0.8 m/s  (0.6-0.9m/s)  PV Max P.3 mmHg  PV Mean P.0 mmHg  PV VTI:     18.50 cm       [id number] Garrett Daley MD  Electronically signed on 2024 at 1:51:45 PM       ** Final **      Physical Exam    Relevant Results               Assessment/Plan         H&P     Addendum     Date of Service: 2024  5:46 AM     Addendum       Expand All Collapse All    History Of Present Illness  Irina WESLY Viral is a 73 y.o. female with  past medical history of Parkinson's, CKD, hypothyroidism, atrial fibrillation on Xarelto, diabetes, hypertension, hyperlipidemia, depression, MI, sacral osteomyelitis, and cholelithiasis, who presented to [facility] ED today via EMS from home for lethargy. EMS stated she had a fever en route. Patient has a albarran catheter that was recently changed on Wednesday.  stated patient has been more lethargic than normal. He states she does not communicate much and is non-ambulatory at baseline. Additional history obtained from chart.     ED course: Temp 37.5C, /71, HR 96, RR 20, 95% RA. EKG unavailable for my review. Labs: WBC 12.0, neutrophils 10.04. RBC 3.98, H&H 10.9/36.4.[id number]. Sodium 135. BUN 29. . Troponin 13. UA: hazy, 3+protein, moderate blood, small leukocytes, 1+bacteria, RBC>20, WBC 21-50. Urine and blood cultures collected. See imaging results below. Azithromycin, zosyn, vancomycin started in ED. Patient will be admitted under the care of Dr. Fitzgerald who will continue to follow. I was asked to H&P and place initial admission orders.  Past Medical History  Medical History        Past Medical History:   Diagnosis Date    Abnormal radiologic findings on diagnostic imaging of right kidney       Abnormal ultrasound of both kidneys    Personal history of other medical treatment       History of echocardiogram            Surgical History  Surgical History         Past Surgical History:   Procedure Laterality Date     SECTION, CLASSIC   2016      Section    CT ABDOMEN PELVIS ANGIOGRAM W AND/OR WO IV CONTRAST   2019     CT ABDOMEN PELVIS ANGIOGRAM W AND/OR WO IV CONTRAST 2019 [facility]    CT AORTA AND BILATERAL ILIOFEMORAL RUNOFF ANGIOGRAM W AND/OR WO IV CONTRAST   2020     CT AORTA AND BILATERAL ILIOFEMORAL RUNOFF ANGIOGRAM W AND/OR WO IV CONTRAST 2020 [facility]ICAL LEGACY    OTHER SURGICAL HISTORY   2020     History of prior surgery     OTHER SURGICAL HISTORY   01/31/2020     Coronary artery stent placement    OTHER SURGICAL HISTORY   06/11/2020     Abdominal aortic aneurysm repair endovascular    OTHER SURGICAL HISTORY   01/31/2020     Cardiac catheterization    OTHER SURGICAL HISTORY   01/17/2019     Renal angioplasty and stenting            Social History  She reports that she quit smoking about 13 months ago. Her smoking use included cigarettes. She has never used smokeless tobacco. She reports that she does not currently use alcohol. She reports that she does not use drugs. Lives with . Bedbound.     Family History  Family History          Family History   Problem Relation Name Age of Onset    Stroke Mother                Allergies  Patient has no known allergies.     Review of Systems   Limited ROS due to patient mentation.  Physical Exam  Constitutional:       Appearance: She is ill-appearing.      Comments: Cachetic appearance   HENT:      Head: Normocephalic and atraumatic.      Nose: Nose normal.      Mouth/Throat:      Mouth: Mucous membranes are moist.      Pharynx: Oropharynx is clear.      Comments: Poor dentition  Eyes:      Extraocular Movements: Extraocular movements intact.      Conjunctiva/sclera: Conjunctivae normal.      Pupils: Pupils are equal, round, and reactive to light.   Cardiovascular:      Rate and Rhythm: Normal rate and regular rhythm.      Pulses: Normal pulses.      Heart sounds: Normal heart sounds.   Pulmonary:      Effort: Pulmonary effort is normal.      Breath sounds: Normal breath sounds.   Abdominal:      General: Abdomen is flat. Bowel sounds are normal.      Palpations: Abdomen is soft.   Genitourinary:     Comments: Rain in place  Musculoskeletal:         General: Normal range of motion.      Cervical back: Normal range of motion and neck supple.   Skin:     General: Skin is warm and dry.      Capillary Refill: Capillary refill takes 2 to 3 seconds.      Comments: Dressing to sacrum    Neurological:      General: No focal deficit present.      Mental Status: She is alert. She is disoriented.      Comments: Answered some questions and followed some commands. Confusion noted.   Psychiatric:         Mood and Affect: Mood normal.            Last Recorded Vitals  Blood pressure 140/70, pulse 98, temperature 37.5 °C (99.5 °F), temperature source Temporal, resp. rate 18, weight 52 kg (114 lb 10.2 oz), SpO2 94 %.     Relevant Results        Results for orders placed or performed during the hospital encounter of 02/23/24 (from the past 24 hour(s))   CBC and Auto Differential   Result Value Ref Range     WBC 12.0 (H) 4.4 - 11.3 x10*3/uL     nRBC 0.0 0.0 - 0.0 /100 WBCs     RBC 3.93 (L) 4.00 - 5.20 x10*6/uL     Hemoglobin 10.9 (L) 12.0 - 16.0 g/dL     Hematocrit 36.4 36.0 - 46.0 %     MCV 93 80 - 100 fL     MCH 27.7 26.0 - 34.0 pg     MCHC 29.9 (L) 32.0 - 36.0 g/dL     RDW 19.6 (H) 11.5 - 14.5 %     Platelets 232 150 - 450 x10*3/uL     Neutrophils % 83.7 40.0 - 80.0 %     Immature Granulocytes %, Automated 0.5 0.0 - 0.9 %     Lymphocytes % 7.8 13.0 - 44.0 %     Monocytes % 7.4 2.0 - 10.0 %     Eosinophils % 0.3 0.0 - 6.0 %     Basophils % 0.3 0.0 - 2.0 %     Neutrophils Absolute 10.04 (H) 1.60 - 5.50 x10*3/uL     Immature Granulocytes Absolute, Automated 0.06 0.00 - 0.50 x10*3/uL     Lymphocytes Absolute 0.94 0.80 - 3.00 x10*3/uL     Monocytes Absolute 0.89 (H) 0.05 - 0.80 x10*3/uL     Eosinophils Absolute 0.04 0.00 - 0.40 x10*3/uL     Basophils Absolute 0.04 0.00 - 0.10 x10*3/uL   Comprehensive Metabolic Panel   Result Value Ref Range     Glucose 166 (H) 74 - 99 mg/dL     Sodium 135 (L) 136 - 145 mmol/L     Potassium 4.1 3.5 - 5.3 mmol/L     Chloride 100 98 - 107 mmol/L     Bicarbonate 27 21 - 32 mmol/L     Anion Gap 12 10 - 20 mmol/L     Urea Nitrogen 29 (H) 6 - 23 mg/dL     Creatinine 0.80 0.50 - 1.05 mg/dL     eGFR 78 >60 mL/min/1.73m*2     Calcium 9.1 8.6 - 10.3 mg/dL     Albumin 3.4 3.4 - 5.0 g/dL      Alkaline Phosphatase 102 33 - 136 U/L     Total Protein 8.4 (H) 6.4 - 8.2 g/dL     AST 27 9 - 39 U/L     Bilirubin, Total 0.7 0.0 - 1.2 mg/dL     ALT 18 7 - 45 U/L   Lactate   Result Value Ref Range     Lactate 1.9 0.4 - 2.0 mmol/L   B-Type Natriuretic Peptide   Result Value Ref Range      (H) 0 - 99 pg/mL   Troponin I, High Sensitivity   Result Value Ref Range     Troponin I, High Sensitivity 13 0 - 13 ng/L   Urinalysis with Reflex Culture and Microscopic   Result Value Ref Range     Color, Urine Yellow Straw, Yellow     Appearance, Urine Hazy (N) Clear     Specific Gravity, Urine 1.017 1.005 - 1.035     pH, Urine 8.0 5.0, 5.5, 6.0, 6.5, 7.0, 7.5, 8.0     Protein, Urine >=500 (3+) (N) NEGATIVE mg/dL     Glucose, Urine NEGATIVE NEGATIVE mg/dL     Blood, Urine MODERATE (2+) (A) NEGATIVE     Ketones, Urine NEGATIVE NEGATIVE mg/dL     Bilirubin, Urine NEGATIVE NEGATIVE     Urobilinogen, Urine <2.0 <2.0 mg/dL     Nitrite, Urine NEGATIVE NEGATIVE     Leukocyte Esterase, Urine SMALL (1+) (A) NEGATIVE   Microscopic Only, Urine   Result Value Ref Range     WBC, Urine 21-50 (A) 1-5, NONE /HPF     RBC, Urine >20 (A) NONE, 1-2, 3-5 /HPF     Bacteria, Urine 1+ (A) NONE SEEN /HPF      XR chest 1 view     Result Date: 2/24/2024  STUDY: Chest Radiograph;  02/23/2024 11:02 PM INDICATION: Sepsis, evaluate for source.  COMPARISON: XR chest 12/04/2022.  CT chest/abdomen/pelvis 06/01/2023.  ACCESSION NUMBER(S): [id number] ORDERING CLINICIAN: KENNA ALCALA TECHNIQUE:  Frontal chest was obtained at 2302 hours. FINDINGS: CARDIOMEDIASTINAL SILHOUETTE: Cardiomediastinal silhouette is enlarged in size and configuration. Calcified plaque is seen in the aorta.  LUNGS: Increased interstitial markings are seen bilaterally with subtle patchy right basilar airspace opacity.  ABDOMEN: No remarkable upper abdominal findings.  BONES: No acute osseous changes.  Generalized osseous demineralization is noted.     Cardiomegaly with increased  interstitial markings and subtle patchy right infrahilar airspace opacity.  Mild pulmonary edema secondary to CHF is favored although right lower lobe pneumonia is a possibility in the appropriate clinical setting. Signed by Salvatore Parada            Assessment/Plan   Principal Problem:    UTI (urinary tract infection)      73-year-old female with past medical history of Parkinson's, CKD, hypothyroidism, atrial fibrillation on Xarelto, diabetes, hypertension, hyperlipidemia, depression, MI, sacral osteomyelitis, and cholelithiasis, who presented to [facility] ED today via EMS from home for lethargy. EMS stated she had a fever en route. Patient has a albarran catheter that was recently changed on Wednesday.  Continue antibiotics. Patient to be hospitalized for further medical management.      #Suspect sepsis likely 2/2 to pneumonia vs UTI  #Suspect pneumonia RLL  #Suspected UTI likely 2/2 chronic indwelling albarran catheter  #Elevated BNP  #Acute metabolic encephalopathy likely 2/2 suspected UTI/ pneumonia   #Leukocytosis  Admit to OBS/telemetry to Dr. Fitzgerald  Defer consults to attending per request  See imaging results above  Echocardiogram ordered  Continue Azithromycin, Zosyn and vancomycin. De-escalate as appropriate  Strep pneumonia urine and legionella ordered  Sputum culture ordered  Titrate oxygen as needed  Blood and Urine cultures pending  Change albarran catheter  Daily weight  PT/OT  Social work  Repeat labs in AM     #Pressure ulcer of sacral region, stage 4  #Pressure ulcer left hip, stage 1  Continue wound care regimen  Air mattress  Turn q 2 hours     #Acute on chronic anemia  H&H 10.9/36.4  Continue to monitor     Chronic issues  #Parkinson's disease  #CKD  #Hypothyroidism  #Atrial fibrillation  #DMII  #HTN  #HLD  #Depression  #MI  #Cholelithiasis  Continue home medications as appropriate when nursing completes home med rec  SSI with hypoglycemic protocol     #DVT prophylaxis  Continue home Xarelto  SCDs     I  spent 45 minutes in the professional and overall care of this patient.           Addendum patient seen and evaluated agree with all the above to treat her for a urinary tract infection change in mental status history of Parkinson's repeat the CBC and the electrolytes continue with all above management thank you                    Revision History         This patient has a urinary catheter   Reason for the urinary catheter remaining today?           Principal Problem:    UTI (urinary tract infection)  Active Problems:    Urinary tract infection associated with indwelling urethral catheter, initial encounter (CMS/[facility])          Malnutrition          I agree with the dietitian's malnutrition diagnosis.     Patient fully evaluated on February 28.  Urine culture results are noted.  Continue present IV antibiotics for additional 24 hours and recheck labs in a.m.    Balaji Fitzgerald MD

## 2024-02-29 NOTE — CARE PLAN
Problem: Skin  Goal: Decreased wound size/increased tissue granulation at next dressing change  Outcome: Progressing  Goal: Participates in plan/prevention/treatment measures  Outcome: Progressing  Goal: Prevent/manage excess moisture  Outcome: Progressing  Goal: Prevent/minimize sheer/friction injuries  Outcome: Progressing  Goal: Promote/optimize nutrition  Outcome: Progressing  Goal: Promote skin healing  Outcome: Progressing     Problem: Pain - Adult  Goal: Verbalizes/displays adequate comfort level or baseline comfort level  Outcome: Progressing     Problem: Safety - Adult  Goal: Free from fall injury  Outcome: Progressing     Problem: Safety - Adult  Goal: Free from fall injury  Outcome: Progressing     Problem: Discharge Planning  Goal: Discharge to home or other facility with appropriate resources  Outcome: Progressing     Problem: Chronic Conditions and Co-morbidities  Goal: Patient's chronic conditions and co-morbidity symptoms are monitored and maintained or improved  Outcome: Progressing     Problem: Diabetes  Goal: Achieve decreasing blood glucose levels by end of shift  Outcome: Progressing  Goal: Increase stability of blood glucose readings by end of shift  Outcome: Progressing  Goal: Decrease in ketones present in urine by end of shift  Outcome: Progressing  Goal: Maintain electrolyte levels within acceptable range throughout shift  Outcome: Progressing  Goal: Maintain glucose levels >70mg/dl to <250mg/dl throughout shift  Outcome: Progressing  Goal: No changes in neurological exam by end of shift  Outcome: Progressing  Goal: Learn about and adhere to nutrition recommendations by end of shift  Outcome: Progressing  Goal: Vital signs within normal range for age by end of shift  Outcome: Progressing  Goal: Increase self care and/or family involovement by end of shift  Outcome: Progressing  Goal: Receive DSME education by end of shift  Outcome: Progressing     Problem: Pain  Goal: Takes deep breaths  with improved pain control throughout the shift  Outcome: Progressing  Goal: Turns in bed with improved pain control throughout the shift  Outcome: Progressing  Goal: Walks with improved pain control throughout the shift  Outcome: Progressing  Goal: Performs ADL's with improved pain control throughout shift  Outcome: Progressing  Goal: Participates in PT with improved pain control throughout the shift  Outcome: Progressing  Goal: Free from opioid side effects throughout the shift  Outcome: Progressing  Goal: Free from acute confusion related to pain meds throughout the shift  Outcome: Progressing   The patient's goals for the shift include  pain free    The clinical goals for the shift include comfort and safety    Over the shift, the patient did not make progress toward the following goals. Barriers to progression include Dx. Recommendations to address these barriers include follow POC.

## 2024-02-29 NOTE — CARE PLAN
Problem: Skin  Goal: Decreased wound size/increased tissue granulation at next dressing change  2/28/2024 2144 by Carlos Manuel Pringle RN  Outcome: Progressing  2/28/2024 2144 by Carlos Manuel Pringle RN  Flowsheets (Taken 2/28/2024 2144)  Decreased wound size/increased tissue granulation at next dressing change:   Promote sleep for wound healing   Protective dressings over bony prominences  Goal: Participates in plan/prevention/treatment measures  2/28/2024 2144 by Carlos Manuel Pringle RN  Outcome: Progressing  2/28/2024 2144 by Carlos Manuel rPingle RN  Flowsheets (Taken 2/28/2024 2144)  Participates in plan/prevention/treatment measures: Elevate heels  Goal: Prevent/manage excess moisture  2/28/2024 2144 by Carlos Manuel Pringle RN  Outcome: Progressing  2/28/2024 2144 by Carlos Manuel Pringle RN  Flowsheets (Taken 2/28/2024 2144)  Prevent/manage excess moisture:   Cleanse incontinence/protect with barrier cream   Monitor for/manage infection if present  Goal: Prevent/minimize sheer/friction injuries  2/28/2024 2144 by Carlos Manuel Pringle RN  Outcome: Progressing  2/28/2024 2144 by Carlos Manuel Pringle RN  Flowsheets (Taken 2/28/2024 2144)  Prevent/minimize sheer/friction injuries:   Use pull sheet   Increase activity/out of bed for meals  Goal: Promote/optimize nutrition  2/28/2024 2144 by Carlos Manuel Pringle RN  Outcome: Progressing  2/28/2024 2144 by Carlos Manuel Pringle RN  Flowsheets (Taken 2/28/2024 2144)  Promote/optimize nutrition:   Assist with feeding   Consume > 50% meals/supplements  Goal: Promote skin healing  2/28/2024 2144 by Carlos Manuel Pringle RN  Outcome: Progressing  2/28/2024 2144 by Carlos Manuel Pringle RN  Flowsheets (Taken 2/28/2024 2144)  Promote skin healing:   Assess skin/pad under line(s)/device(s)   Turn/reposition every 2 hours/use positioning/transfer devices     Problem: Pain - Adult  Goal: Verbalizes/displays adequate comfort level or baseline comfort level  Outcome: Progressing     Problem: Safety - Adult  Goal: Free from fall injury  Outcome: Progressing      Problem: Discharge Planning  Goal: Discharge to home or other facility with appropriate resources  Outcome: Progressing     Problem: Chronic Conditions and Co-morbidities  Goal: Patient's chronic conditions and co-morbidity symptoms are monitored and maintained or improved  Outcome: Progressing     Problem: Diabetes  Goal: Achieve decreasing blood glucose levels by end of shift  Outcome: Progressing  Goal: Increase stability of blood glucose readings by end of shift  Outcome: Progressing  Goal: Decrease in ketones present in urine by end of shift  Outcome: Progressing  Goal: Maintain electrolyte levels within acceptable range throughout shift  Outcome: Progressing  Goal: Maintain glucose levels >70mg/dl to <250mg/dl throughout shift  Outcome: Progressing  Goal: No changes in neurological exam by end of shift  Outcome: Progressing  Goal: Learn about and adhere to nutrition recommendations by end of shift  Outcome: Progressing  Goal: Vital signs within normal range for age by end of shift  Outcome: Progressing  Goal: Increase self care and/or family involovement by end of shift  Outcome: Progressing  Goal: Receive DSME education by end of shift  Outcome: Progressing     Problem: Pain  Goal: Takes deep breaths with improved pain control throughout the shift  Outcome: Progressing  Goal: Turns in bed with improved pain control throughout the shift  Outcome: Progressing  Goal: Walks with improved pain control throughout the shift  Outcome: Progressing  Goal: Performs ADL's with improved pain control throughout shift  Outcome: Progressing  Goal: Participates in PT with improved pain control throughout the shift  Outcome: Progressing  Goal: Free from opioid side effects throughout the shift  Outcome: Progressing  Goal: Free from acute confusion related to pain meds throughout the shift  Outcome: Progressing     The patient's goals for the shift include      The clinical goals for the shift include comfort and safety

## 2024-02-29 NOTE — PROGRESS NOTES
"Nutrition Follow Up Assessment:     Nutrition History:  Energy Intake: Fair 50-75 % (however limited meal intakes recorded on flow sheet)  Food and Nutrient History: Pt not appropriate at time of attempted visit today. Crying.  Food Allergies/Intolerances:  None  GI Symptoms: None  Oral Problems: None       Anthropometrics:  Height: 167.6 cm (5' 5.98\")   Weight: 54 kg (119 lb 0.8 oz)   BMI (Calculated): 19.22  IBW/kg (Dietitian Calculated): 59.1 kg  Percent of IBW: 91 %       Weight History:     Weight Change %:  Weight History / % Weight Change: 2/29/24 54kg, 2/26/24 56.7kg, 2/24/24 52kg, 1/16 47.6kg, 12/12 47.6kg, 10/20 49.9kg, 7/24 52.2kg, 3/27 54.9kg  (unsure if current EMR weight is correct compared to hx) reweigh pt    Nutrition Focused Physical Exam Findings:  defer: not appropriate  Subcutaneous Fat Loss:      Muscle Wasting:     Edema:  Edema: none  Physical Findings:  Skin: Positive (stage IV sacrum and Stage 1 L hip)    Nutrition Significant Labs:  BMP Trend:   Results from last 7 days   Lab Units 02/29/24  0453 02/25/24  0449 02/23/24  2259   GLUCOSE mg/dL 101* 80 166*   CALCIUM mg/dL 8.9 8.4* 9.1   SODIUM mmol/L 136 138 135*   POTASSIUM mmol/L 3.9 3.7 4.1   CO2 mmol/L 22 24 27   CHLORIDE mmol/L 103 105 100   BUN mg/dL 32* 22 29*   CREATININE mg/dL 0.95 0.87 0.80    , BG POCT trend:   Results from last 7 days   Lab Units 02/29/24  0627 02/28/24  2043 02/28/24  1637 02/28/24  1131 02/28/24  0606   POCT GLUCOSE mg/dL 111* 155* 79 160* 132*        Nutrition Specific Medications:  Reviewed     I/O:   Last BM Date: 02/28/24; Stool Appearance: Formed (02/29/24 0011)        Dietary Orders (From admission, onward)       Start     Ordered    02/26/24 0925  Oral nutritional supplements  Until discontinued        Question Answer Comment   Deliver with Dinner    Deliver with Lunch    Select supplement: Elieser        02/26/24 0925    02/26/24 0925  Oral nutritional supplements  Until discontinued        Comments: " chocolate   Question Answer Comment   Deliver with All meals    Select supplement: Glucerna Shake        02/26/24 0925    02/25/24 1000  Adult diet Carb Controlled; 60 gram carb/meal, 30 gram Carb evening snack  Diet effective now        Question Answer Comment   Diet type Carb Controlled    Carb diet selection: 60 gram carb/meal, 30 gram Carb evening snack        02/25/24 0959                     Estimated Needs:      Method for Estimating Needs: 1600-1800kcals (27-30kcals/kg IBW)     Method for Estimating Needs: 82-106g (1.4-1.8g/kg IBW)     Method for Estimating Needs: 1 mL/kcal or as per MD        Nutrition Diagnosis   Malnutrition Diagnosis  Patient has Malnutrition Diagnosis: No    Nutrition Diagnosis  Patient has Nutrition Diagnosis: Yes  Diagnosis Status (1): Ongoing  Nutrition Diagnosis 1: Predicted inadequate energy intake  Related to (1): acute on chronic illness  As Evidenced by (1): MST reports decreased po intakes, altered mental status  Additional Nutrition Diagnosis: Diagnosis 2  Diagnosis Status (2): Ongoing  Nutrition Diagnosis 2: Increased nutrient needs  Related to (2): physiological causes increasing nutrient needs  As Evidenced by (2): wound healing needs       Nutrition Interventions/Recommendations         Nutrition Prescription:  Individualized Nutrition Prescription Provided for : 60gm carb controlled diet with Glucerna shakes three times daily and Elieser twice daily        Nutrition Interventions:   Interventions: Meals and snacks, Medical food supplement  Meals and Snacks: Carbohydrate-modified diet  Goal: consume >50-75% of meals --met  Medical Food Supplement: Commercial beverage  Goal: consume >75% of Elieser twice daily (for an additional 90 kcals, 2.5 gm protein, supplement glutamine and arginine) and Glucerna shakes three times daily (for an additional 220kcals, 10gm protein each)  Additional Interventions: consider daily MVI with minerals         Nutrition Education:   Not appropriate         Nutrition Monitoring and Evaluation   Food/Nutrient Related History Monitoring  Monitoring and Evaluation Plan: Energy intake, Fluid intake, Amount of food  Energy Intake: Estimated energy intake  Criteria: Meal/ONS intake to meet >75% of estimated needs  Fluid Intake: Estimated fluid intake  Criteria: fluid intake to meet >75% of estimated need  Amount of Food: Estimated amout of food, Medical food intake  Criteria: Pt to consume >75% of meals/ONS    Body Composition/Growth/Weight History  Monitoring and Evaluation Plan: Weight  Weight: Measured weight  Criteria: reweigh at least every 5 days    Biochemical Data, Medical Tests and Procedures  Monitoring and Evaluation Plan: Electrolyte/renal panel, Glucose/endocrine profile  Criteria: Labs WNL    Nutrition Focused Physical Findings  Monitoring and Evaluation Plan: Skin  Criteria: promote wound healing       Time Spent/Follow-up Reminder:   Time Spent (min): 30 minutes  Last Date of Nutrition Visit: 02/29/24  Nutrition Follow-Up Needed?: 3-5 days  Follow up Comment: 3/5/24 TG

## 2024-02-29 NOTE — PROGRESS NOTES
Irina Stratton is a 73 y.o. female on day 5 of admission presenting with UTI (urinary tract infection).      Subjective   Patient fully evaluated on February 28 and clinically improved.       Objective     Last Recorded Vitals  /77   Pulse 87   Temp 36.8 °C (98.2 °F)   Resp 16   Wt 54 kg (119 lb 0.8 oz)   SpO2 95%   Intake/Output last 3 Shifts:    Intake/Output Summary (Last 24 hours) at 2/29/2024 1700  Last data filed at 2/29/2024 1026  Gross per 24 hour   Intake 520 ml   Output 750 ml   Net -230 ml         Admission Weight  Weight: 52 kg (114 lb 10.2 oz) (02/24/24 0300)    Daily Weight  02/29/24 : 54 kg (119 lb 0.8 oz)    Image Results  Transthoracic Echo (TTE) Complete      [facility], [address] [place] [id number] [phone] and                                  Fax [phone]    TRANSTHORACIC ECHOCARDIOGRAM REPORT       Patient Name:      IRINA STRATTON     Reading Physician:    [id number] Garrett Daley MD  Study Date:        2/25/2024            Ordering Provider:    [id number] PASTOR HEARD  MRN/PID:           [id number]             Fellow:  Accession#:        [id number]         Nurse:  Date of Birth/Age: 1950 / 73 years Sonographer:          Sonam Martinez RDCS  Gender:            F                    Additional Staff:  Height:            167.00 cm            Admit Date:           2/24/2024  Weight:            56.00 kg             Admission Status:     Inpatient -                                                                Priority discharge  BSA / BMI:         1.62 m2 / 20.08      Encounter#:           [id number]                     kg/m2                                          Department Location:  [facility]  Blood Pressure: 127 /59 mmHg    Study Type:    TRANSTHORACIC ECHO (TTE) COMPLETE  Diagnosis/ICD: Elevated  Troponin-R79.89  Indication:    Elevated Troponin  CPT Code:      Echo Complete w Full Doppler-92025    Patient History:  Pertinent History: A-Fib, AAA and HTN.    Study Detail: The following Echo studies were performed: 2D, M-Mode, Doppler and                color flow. Technically challenging study due to prominent lung                artifact.       PHYSICIAN INTERPRETATION:  Left Ventricle: The left ventricular systolic function is low normal, with an estimated ejection fraction of 50%. There are no regional wall motion abnormalities. The left ventricular cavity size is normal. Spectral Doppler shows an impaired relaxation pattern of left ventricular diastolic filling.  Left Atrium: The left atrium is upper limits of normal in size.  Right Ventricle: The right ventricle is normal in size. There is normal right ventricular global systolic function.  Right Atrium: The right atrium is normal in size.  Aortic Valve: The aortic valve is trileaflet. There is evidence of mild aortic valve stenosis.  There is no evidence of aortic valve regurgitation. The peak instantaneous gradient of the aortic valve is 11.3 mmHg. The mean gradient of the aortic valve is 6.0 mmHg.  Mitral Valve: The mitral valve is normal in structure. There is mild mitral valve regurgitation.  Tricuspid Valve: The tricuspid valve is structurally normal. No evidence of tricuspid regurgitation.  Pulmonic Valve: The pulmonic valve is structurally normal. There is no indication of pulmonic valve regurgitation.  Pericardium: There is no pericardial effusion noted.  Aorta: The aortic root is normal.       CONCLUSIONS:   1. Left ventricular systolic function is low normal with a 50% estimated ejection fraction.   2. Spectral Doppler shows an impaired relaxation pattern of left ventricular diastolic filling.   3. Mild aortic valve stenosis.    QUANTITATIVE DATA SUMMARY:  2D MEASUREMENTS:                            Normal Ranges:  IVSd:          1.05 cm     (0.6-1.1cm)  LVPWd:         1.02 cm    (0.6-1.1cm)  LVIDd:         4.78 cm    (3.9-5.9cm)  LVIDs:         3.27 cm  LV Mass Index: 109.0 g/m2  LV % FS        31.6 %    AORTA MEASUREMENTS:                     Normal Ranges:  Asc Ao, d: 3.20 cm (2.1-3.4cm)    LV SYSTOLIC FUNCTION BY 2D PLANIMETRY (MOD):                      Normal Ranges:  EF-A4C View: 52.0 % (>=55%)  EF-A2C View: 45.9 %  EF-Biplane:  49.7 %    LV DIASTOLIC FUNCTION:                         Normal Ranges:  MV Peak E:    0.93 m/s (0.7-1.2 m/s)  MV Peak A:    0.92 m/s (0.42-0.7 m/s)  E/A Ratio:    1.01     (1.0-2.2)  MV lateral e' 0.09 m/s  MV medial e'  0.08 m/s    MITRAL VALVE:                  Normal Ranges:  MV DT: 218 msec (150-240msec)    AORTIC VALVE:                                     Normal Ranges:  AoV Vmax:                1.68 m/s  (<=1.7m/s)  AoV Peak P.3 mmHg (<20mmHg)  AoV Mean P.0 mmHg  (1.7-11.5mmHg)  LVOT Max Oscar:            0.63 m/s  (<=1.1m/s)  AoV VTI:                 41.50 cm  (18-25cm)  LVOT VTI:                15.70 cm  LVOT Diameter:           2.20 cm   (1.8-2.4cm)  AoV Area, VTI:           1.44 cm2  (2.5-5.5cm2)  AoV Area,Vmax:           1.42 cm2  (2.5-4.5cm2)  AoV Dimensionless Index: 0.38       RIGHT VENTRICLE:  RV Basal 2.39 cm  RV Mid   1.60 cm  RV Major 7.0 cm  TAPSE:   20.5 mm  RV s'    0.16 m/s    TRICUSPID VALVE/RVSP:                              Normal Ranges:  Peak TR Velocity: 2.36 m/s  RV Syst Pressure: 25.3 mmHg (< 30mmHg)    PULMONIC VALVE:                       Normal Ranges:  PV Max Oscar: 0.8 m/s  (0.6-0.9m/s)  PV Max P.3 mmHg  PV Mean P.0 mmHg  PV VTI:     18.50 cm       [id number] Garrett Daley MD  Electronically signed on 2024 at 1:51:45 PM       ** Final **      Physical Exam    Relevant Results               Assessment/Plan         H&P     Addendum     Date of Service: 2024  5:46 AM     Addendum       Expand All Collapse All    History Of Present Illness  Irina JARRELL  Viral is a 73 y.o. female with past medical history of Parkinson's, CKD, hypothyroidism, atrial fibrillation on Xarelto, diabetes, hypertension, hyperlipidemia, depression, MI, sacral osteomyelitis, and cholelithiasis, [facility] home for lethargy. EMS stated she had a fever en route. Patient has a albarran catheter that was recently changed on Wednesday.  stated patient has been more lethargic than normal. He states she does not communicate much and is non-ambulatory at baseline. Additional history obtained from chart.     ED course: Temp 37.5C, /71, HR 96, RR 20, 95% RA. EKG unavailable for my review. Labs: WBC 12.0, neutrophils 10.04. RBC 3.98, H&H 10.9/36.4.[id number]. Sodium 135. BUN 29. . Troponin 13. UA: hazy, 3+protein, moderate blood, small leukocytes, 1+bacteria, RBC>20, WBC 21-50. Urine and blood cultures collected. See imaging results below. Azithromycin, zosyn, vancomycin started in ED. Patient will be admitted under the care of Dr. Fitzgerald who will continue to follow. I was asked to H&P and place initial admission orders.  Past Medical History  Medical History        Past Medical History:   Diagnosis Date    Abnormal radiologic findings on diagnostic imaging of right kidney       Abnormal ultrasound of both kidneys    Personal history of other medical treatment       History of echocardiogram            Surgical History  Surgical History         Past Surgical History:   Procedure Laterality Date     SECTION, CLASSIC   2016      Section    CT ABDOMEN PELVIS ANGIOGRAM W AND/OR WO IV CONTRAST   2019     CT ABDOMEN PELVIS ANGIOGRAM W AND/OR WO IV CONTRAST 2019 [facility]    CT AORTA AND BILATERAL ILIOFEMORAL RUNOFF ANGIOGRAM W AND/OR WO IV CONTRAST   2020     CT AORTA AND BILATERAL ILIOFEMORAL RUNOFF ANGIOGRAM W AND/OR WO IV CONTRAST 2020 [facility]    OTHER SURGICAL HISTORY   2020      History of prior surgery    OTHER SURGICAL HISTORY   01/31/2020     Coronary artery stent placement    OTHER SURGICAL HISTORY   06/11/2020     Abdominal aortic aneurysm repair endovascular    OTHER SURGICAL HISTORY   01/31/2020     Cardiac catheterization    OTHER SURGICAL HISTORY   01/17/2019     Renal angioplasty and stenting            Social History  She reports that she quit smoking about 13 months ago. Her smoking use included cigarettes. She has never used smokeless tobacco. She reports that she does not currently use alcohol. She reports that she does not use drugs. Lives with . Bedbound.     Family History  Family History          Family History   Problem Relation Name Age of Onset    Stroke Mother                Allergies  Patient has no known allergies.     Review of Systems   Limited ROS due to patient mentation.  Physical Exam  Constitutional:       Appearance: She is ill-appearing.      Comments: Cachetic appearance   HENT:      Head: Normocephalic and atraumatic.      Nose: Nose normal.      Mouth/Throat:      Mouth: Mucous membranes are moist.      Pharynx: Oropharynx is clear.      Comments: Poor dentition  Eyes:      Extraocular Movements: Extraocular movements intact.      Conjunctiva/sclera: Conjunctivae normal.      Pupils: Pupils are equal, round, and reactive to light.   Cardiovascular:      Rate and Rhythm: Normal rate and regular rhythm.      Pulses: Normal pulses.      Heart sounds: Normal heart sounds.   Pulmonary:      Effort: Pulmonary effort is normal.      Breath sounds: Normal breath sounds.   Abdominal:      General: Abdomen is flat. Bowel sounds are normal.      Palpations: Abdomen is soft.   Genitourinary:     Comments: Rain in place  Musculoskeletal:         General: Normal range of motion.      Cervical back: Normal range of motion and neck supple.   Skin:     General: Skin is warm and dry.      Capillary Refill: Capillary refill takes 2 to 3 seconds.      Comments:  Dressing to sacrum   Neurological:      General: No focal deficit present.      Mental Status: She is alert. She is disoriented.      Comments: Answered some questions and followed some commands. Confusion noted.   Psychiatric:         Mood and Affect: Mood normal.            Last Recorded Vitals  Blood pressure 140/70, pulse 98, temperature 37.5 °C (99.5 °F), temperature source Temporal, resp. rate 18, weight 52 kg (114 lb 10.2 oz), SpO2 94 %.     Relevant Results        Results for orders placed or performed during the hospital encounter of 02/23/24 (from the past 24 hour(s))   CBC and Auto Differential   Result Value Ref Range     WBC 12.0 (H) 4.4 - 11.3 x10*3/uL     nRBC 0.0 0.0 - 0.0 /100 WBCs     RBC 3.93 (L) 4.00 - 5.20 x10*6/uL     Hemoglobin 10.9 (L) 12.0 - 16.0 g/dL     Hematocrit 36.4 36.0 - 46.0 %     MCV 93 80 - 100 fL     MCH 27.7 26.0 - 34.0 pg     MCHC 29.9 (L) 32.0 - 36.0 g/dL     RDW 19.6 (H) 11.5 - 14.5 %     Platelets 232 150 - 450 x10*3/uL     Neutrophils % 83.7 40.0 - 80.0 %     Immature Granulocytes %, Automated 0.5 0.0 - 0.9 %     Lymphocytes % 7.8 13.0 - 44.0 %     Monocytes % 7.4 2.0 - 10.0 %     Eosinophils % 0.3 0.0 - 6.0 %     Basophils % 0.3 0.0 - 2.0 %     Neutrophils Absolute 10.04 (H) 1.60 - 5.50 x10*3/uL     Immature Granulocytes Absolute, Automated 0.06 0.00 - 0.50 x10*3/uL     Lymphocytes Absolute 0.94 0.80 - 3.00 x10*3/uL     Monocytes Absolute 0.89 (H) 0.05 - 0.80 x10*3/uL     Eosinophils Absolute 0.04 0.00 - 0.40 x10*3/uL     Basophils Absolute 0.04 0.00 - 0.10 x10*3/uL   Comprehensive Metabolic Panel   Result Value Ref Range     Glucose 166 (H) 74 - 99 mg/dL     Sodium 135 (L) 136 - 145 mmol/L     Potassium 4.1 3.5 - 5.3 mmol/L     Chloride 100 98 - 107 mmol/L     Bicarbonate 27 21 - 32 mmol/L     Anion Gap 12 10 - 20 mmol/L     Urea Nitrogen 29 (H) 6 - 23 mg/dL     Creatinine 0.80 0.50 - 1.05 mg/dL     eGFR 78 >60 mL/min/1.73m*2     Calcium 9.1 8.6 - 10.3 mg/dL     Albumin  3.4 3.4 - 5.0 g/dL     Alkaline Phosphatase 102 33 - 136 U/L     Total Protein 8.4 (H) 6.4 - 8.2 g/dL     AST 27 9 - 39 U/L     Bilirubin, Total 0.7 0.0 - 1.2 mg/dL     ALT 18 7 - 45 U/L   Lactate   Result Value Ref Range     Lactate 1.9 0.4 - 2.0 mmol/L   B-Type Natriuretic Peptide   Result Value Ref Range      (H) 0 - 99 pg/mL   Troponin I, High Sensitivity   Result Value Ref Range     Troponin I, High Sensitivity 13 0 - 13 ng/L   Urinalysis with Reflex Culture and Microscopic   Result Value Ref Range     Color, Urine Yellow Straw, Yellow     Appearance, Urine Hazy (N) Clear     Specific Gravity, Urine 1.017 1.005 - 1.035     pH, Urine 8.0 5.0, 5.5, 6.0, 6.5, 7.0, 7.5, 8.0     Protein, Urine >=500 (3+) (N) NEGATIVE mg/dL     Glucose, Urine NEGATIVE NEGATIVE mg/dL     Blood, Urine MODERATE (2+) (A) NEGATIVE     Ketones, Urine NEGATIVE NEGATIVE mg/dL     Bilirubin, Urine NEGATIVE NEGATIVE     Urobilinogen, Urine <2.0 <2.0 mg/dL     Nitrite, Urine NEGATIVE NEGATIVE     Leukocyte Esterase, Urine SMALL (1+) (A) NEGATIVE   Microscopic Only, Urine   Result Value Ref Range     WBC, Urine 21-50 (A) 1-5, NONE /HPF     RBC, Urine >20 (A) NONE, 1-2, 3-5 /HPF     Bacteria, Urine 1+ (A) NONE SEEN /HPF      XR chest 1 view     Result Date: 2/24/2024  STUDY: Chest Radiograph;  02/23/2024 11:02 PM INDICATION: Sepsis, evaluate for source.  COMPARISON: XR chest 12/04/2022.  CT chest/abdomen/pelvis 06/01/2023.  ACCESSION NUMBER(S): [id number] ORDERING CLINICIAN: KENNA ALCALA TECHNIQUE:  Frontal chest was obtained at 2302 hours. FINDINGS: CARDIOMEDIASTINAL SILHOUETTE: Cardiomediastinal silhouette is enlarged in size and configuration. Calcified plaque is seen in the aorta.  LUNGS: Increased interstitial markings are seen bilaterally with subtle patchy right basilar airspace opacity.  ABDOMEN: No remarkable upper abdominal findings.  BONES: No acute osseous changes.  Generalized osseous demineralization is noted.      Cardiomegaly with increased interstitial markings and subtle patchy right infrahilar airspace opacity.  Mild pulmonary edema secondary to CHF is favored although right lower lobe pneumonia is a possibility in the appropriate clinical setting. Signed by Salvatore Parada            Assessment/Plan   Principal Problem:    UTI (urinary tract infection)      73-year-old female with past medical history of Parkinson's, CKD, hypothyroidism, atrial fibrillation on Xarelto, diabetes, hypertension, hyperlipidemia, depression, MI, sacral osteomyelitis, and cholelithiasis, who presented to [facility] ED today via EMS from home for lethargy. EMS stated she had a fever en route. Patient has a albarran catheter that was recently changed on Wednesday.  Continue antibiotics. Patient to be hospitalized for further medical management.      #Suspect sepsis likely 2/2 to pneumonia vs UTI  #Suspect pneumonia RLL  #Suspected UTI likely 2/2 chronic indwelling albarran catheter  #Elevated BNP  #Acute metabolic encephalopathy likely 2/2 suspected UTI/ pneumonia   #Leukocytosis  Admit to OBS/telemetry to Dr. Fitzgerald  Defer consults to attending per request  See imaging results above  Echocardiogram ordered  Continue Azithromycin, Zosyn and vancomycin. De-escalate as appropriate  Strep pneumonia urine and legionella ordered  Sputum culture ordered  Titrate oxygen as needed  Blood and Urine cultures pending  Change albarran catheter  Daily weight  PT/OT  Social work  Repeat labs in AM     #Pressure ulcer of sacral region, stage 4  #Pressure ulcer left hip, stage 1  Continue wound care regimen  Air mattress  Turn q 2 hours     #Acute on chronic anemia  H&H 10.9/36.4  Continue to monitor     Chronic issues  #Parkinson's disease  #CKD  #Hypothyroidism  #Atrial fibrillation  #DMII  #HTN  #HLD  #Depression  #MI  #Cholelithiasis  Continue home medications as appropriate when nursing completes home med rec  SSI with hypoglycemic protocol     #DVT  prophylaxis  Continue home Xarelto  SCDs     I spent 45 minutes in the professional and overall care of this patient.           Addendum patient seen and evaluated agree with all the above to treat her for a urinary tract infection change in mental status history of Parkinson's repeat the CBC and the electrolytes continue with all above management thank you                    Revision History         This patient has a urinary catheter   Reason for the urinary catheter remaining today?           Principal Problem:    UTI (urinary tract infection)  Active Problems:    Urinary tract infection associated with indwelling urethral catheter, initial encounter (CMS/[facility])          Malnutrition     Patient fully evaluated on February 29.  IV antibiotics have been discontinued will monitor overnight.  Recheck labs in AM.  Remeron added for appetite stimulant     I agree with the dietitian's malnutrition diagnosis.     Patient fully evaluated on February 28.  Urine culture results are noted.  Continue present IV antibiotics for additional 24 hours and recheck labs in a.m.    Balaji Fitzgerald MD

## 2024-03-01 NOTE — PROGRESS NOTES
Irina Stratton is a 73 y.o. female on day 6 of admission presenting with UTI (urinary tract infection).      Subjective   Patient fully evaluated on February 28 and clinically improved.       Objective     Last Recorded Vitals  /76   Pulse 89   Temp 37.4 °C (99.3 °F)   Resp 20   Wt 53.5 kg (117 lb 15.1 oz)   SpO2 93%   Intake/Output last 3 Shifts:    Intake/Output Summary (Last 24 hours) at 3/1/2024 1519  Last data filed at 3/1/2024 0900  Gross per 24 hour   Intake 100 ml   Output --   Net 100 ml         Admission Weight  Weight: 52 kg (114 lb 10.2 oz) (02/24/24 0300)    Daily Weight  03/01/24 : 53.5 kg (117 lb 15.1 oz)    Image Results  Transthoracic Echo (TTE) Complete      [facility], [address] [place] [id number] [phone] and                                  Fax [phone]    TRANSTHORACIC ECHOCARDIOGRAM REPORT       Patient Name:      IRINA STRATTON     Reading Physician:    [id number] Garrett Daley MD  Study Date:        2/25/2024            Ordering Provider:    [id number] PASTOR HEARD  MRN/PID:           [id number]             Fellow:  Accession#:        [id number]         Nurse:  Date of Birth/Age: 1950 / 73 years Sonographer:          Sonam Martinez RDCS  Gender:            F                    Additional Staff:  Height:            167.00 cm            Admit Date:           2/24/2024  Weight:            56.00 kg             Admission Status:     Inpatient -                                                                Priority discharge  BSA / BMI:         1.62 m2 / 20.08      Encounter#:           [id number]                     kg/m2                                          Department Location:  [facility]  Blood Pressure: 127 /59 mmHg    Study Type:    TRANSTHORACIC ECHO (TTE) COMPLETE  Diagnosis/ICD: Elevated  Troponin-R79.89  Indication:    Elevated Troponin  CPT Code:      Echo Complete w Full Doppler-[id number]    Patient History:  Pertinent History: A-Fib, AAA and HTN.    Study Detail: The following Echo studies were performed: 2D, M-Mode, Doppler and                color flow. Technically challenging study due to prominent lung                artifact.       PHYSICIAN INTERPRETATION:  Left Ventricle: The left ventricular systolic function is low normal, with an estimated ejection fraction of 50%. There are no regional wall motion abnormalities. The left ventricular cavity size is normal. Spectral Doppler shows an impaired relaxation pattern of left ventricular diastolic filling.  Left Atrium: The left atrium is upper limits of normal in size.  Right Ventricle: The right ventricle is normal in size. There is normal right ventricular global systolic function.  Right Atrium: The right atrium is normal in size.  Aortic Valve: The aortic valve is trileaflet. There is evidence of mild aortic valve stenosis.  There is no evidence of aortic valve regurgitation. The peak instantaneous gradient of the aortic valve is 11.3 mmHg. The mean gradient of the aortic valve is 6.0 mmHg.  Mitral Valve: The mitral valve is normal in structure. There is mild mitral valve regurgitation.  Tricuspid Valve: The tricuspid valve is structurally normal. No evidence of tricuspid regurgitation.  Pulmonic Valve: The pulmonic valve is structurally normal. There is no indication of pulmonic valve regurgitation.  Pericardium: There is no pericardial effusion noted.  Aorta: The aortic root is normal.       CONCLUSIONS:   1. Left ventricular systolic function is low normal with a 50% estimated ejection fraction.   2. Spectral Doppler shows an impaired relaxation pattern of left ventricular diastolic filling.   3. Mild aortic valve stenosis.    QUANTITATIVE DATA SUMMARY:  2D MEASUREMENTS:                            Normal Ranges:  IVSd:          1.05 cm     (0.6-1.1cm)  LVPWd:         1.02 cm    (0.6-1.1cm)  LVIDd:         4.78 cm    (3.9-5.9cm)  LVIDs:         3.27 cm  LV Mass Index: 109.0 g/m2  LV % FS        31.6 %    AORTA MEASUREMENTS:                     Normal Ranges:  Asc Ao, d: 3.20 cm (2.1-3.4cm)    LV SYSTOLIC FUNCTION BY 2D PLANIMETRY (MOD):                      Normal Ranges:  EF-A4C View: 52.0 % (>=55%)  EF-A2C View: 45.9 %  EF-Biplane:  49.7 %    LV DIASTOLIC FUNCTION:                         Normal Ranges:  MV Peak E:    0.93 m/s (0.7-1.2 m/s)  MV Peak A:    0.92 m/s (0.42-0.7 m/s)  E/A Ratio:    1.01     (1.0-2.2)  MV lateral e' 0.09 m/s  MV medial e'  0.08 m/s    MITRAL VALVE:                  Normal Ranges:  MV DT: 218 msec (150-240msec)    AORTIC VALVE:                                     Normal Ranges:  AoV Vmax:                1.68 m/s  (<=1.7m/s)  AoV Peak P.3 mmHg (<20mmHg)  AoV Mean P.0 mmHg  (1.7-11.5mmHg)  LVOT Max Oscar:            0.63 m/s  (<=1.1m/s)  AoV VTI:                 41.50 cm  (18-25cm)  LVOT VTI:                15.70 cm  LVOT Diameter:           2.20 cm   (1.8-2.4cm)  AoV Area, VTI:           1.44 cm2  (2.5-5.5cm2)  AoV Area,Vmax:           1.42 cm2  (2.5-4.5cm2)  AoV Dimensionless Index: 0.38       RIGHT VENTRICLE:  RV Basal 2.39 cm  RV Mid   1.60 cm  RV Major 7.0 cm  TAPSE:   20.5 mm  RV s'    0.16 m/s    TRICUSPID VALVE/RVSP:                              Normal Ranges:  Peak TR Velocity: 2.36 m/s  RV Syst Pressure: 25.3 mmHg (< 30mmHg)    PULMONIC VALVE:                       Normal Ranges:  PV Max Oscar: 0.8 m/s  (0.6-0.9m/s)  PV Max P.3 mmHg  PV Mean P.0 mmHg  PV VTI:     18.50 cm       [id number] Garrett Daley MD  Electronically signed on 2024 at 1:51:45 PM       ** Final **      Physical Exam    Relevant Results               Assessment/Plan         H&P     Addendum     Date of Service: 2024  5:46 AM     Addendum       Expand All Collapse All    History Of Present Illness  Irina JARRELL  Viral is a 73 y.o. female with past medical history of Parkinson's, CKD, hypothyroidism, atrial fibrillation on Xarelto, diabetes, hypertension, hyperlipidemia, depression, MI, sacral osteomyelitis, and cholelithiasis, who presented to [facility] ED today via EMS from home for lethargy. EMS stated she had a fever en route. Patient has a albarran catheter that was recently changed on Wednesday.  stated patient has been more lethargic than normal. He states she does not communicate much and is non-ambulatory at baseline. Additional history obtained from chart.     ED course: Temp 37.5C, /71, HR 96, RR 20, 95% RA. EKG unavailable for my review. Labs: WBC 12.0, neutrophils 10.04. RBC 3.98, H&H 10.9/36.4.[id number]. Sodium 135. BUN 29. . Troponin 13. UA: hazy, 3+protein, moderate blood, small leukocytes, 1+bacteria, RBC>20, WBC 21-50. Urine and blood cultures collected. See imaging results below. Azithromycin, zosyn, vancomycin started in ED. Patient will be admitted under the care of Dr. Fitzgerald who will continue to follow. I was asked to H&P and place initial admission orders.  Past Medical History  Medical History        Past Medical History:   Diagnosis Date    Abnormal radiologic findings on diagnostic imaging of right kidney       Abnormal ultrasound of both kidneys    Personal history of other medical treatment       History of echocardiogram            Surgical History  Surgical History         Past Surgical History:   Procedure Laterality Date     SECTION, CLASSIC   2016      Section    CT ABDOMEN PELVIS ANGIOGRAM W AND/OR WO IV CONTRAST   2019     CT ABDOMEN PELVIS ANGIOGRAM W AND/OR WO IV CONTRAST 2019 [facility]    CT AORTA AND BILATERAL ILIOFEMORAL RUNOFF ANGIOGRAM W AND/OR WO IV CONTRAST   2020     CT AORTA AND BILATERAL ILIOFEMORAL RUNOFF ANGIOGRAM W AND/OR WO IV CONTRAST 2020 [facility]ICAL LEGAC[facility]    OTHER SURGICAL HISTORY   2020      History of prior surgery    OTHER SURGICAL HISTORY   01/31/2020     Coronary artery stent placement    OTHER SURGICAL HISTORY   06/11/2020     Abdominal aortic aneurysm repair endovascular    OTHER SURGICAL HISTORY   01/31/2020     Cardiac catheterization    OTHER SURGICAL HISTORY   01/17/2019     Renal angioplasty and stenting            Social History  She reports that she quit smoking about 13 months ago. Her smoking use included cigarettes. She has never used smokeless tobacco. She reports that she does not currently use alcohol. She reports that she does not use drugs. Lives with . Bedbound.     Family History  Family History          Family History   Problem Relation Name Age of Onset    Stroke Mother                Allergies  Patient has no known allergies.     Review of Systems   Limited ROS due to patient mentation.  Physical Exam  Constitutional:       Appearance: She is ill-appearing.      Comments: Cachetic appearance   HENT:      Head: Normocephalic and atraumatic.      Nose: Nose normal.      Mouth/Throat:      Mouth: Mucous membranes are moist.      Pharynx: Oropharynx is clear.      Comments: Poor dentition  Eyes:      Extraocular Movements: Extraocular movements intact.      Conjunctiva/sclera: Conjunctivae normal.      Pupils: Pupils are equal, round, and reactive to light.   Cardiovascular:      Rate and Rhythm: Normal rate and regular rhythm.      Pulses: Normal pulses.      Heart sounds: Normal heart sounds.   Pulmonary:      Effort: Pulmonary effort is normal.      Breath sounds: Normal breath sounds.   Abdominal:      General: Abdomen is flat. Bowel sounds are normal.      Palpations: Abdomen is soft.   Genitourinary:     Comments: Rain in place  Musculoskeletal:         General: Normal range of motion.      Cervical back: Normal range of motion and neck supple.   Skin:     General: Skin is warm and dry.      Capillary Refill: Capillary refill takes 2 to 3 seconds.      Comments:  Dressing to sacrum   Neurological:      General: No focal deficit present.      Mental Status: She is alert. She is disoriented.      Comments: Answered some questions and followed some commands. Confusion noted.   Psychiatric:         Mood and Affect: Mood normal.            Last Recorded Vitals  Blood pressure 140/70, pulse 98, temperature 37.5 °C (99.5 °F), temperature source Temporal, resp. rate 18, weight 52 kg (114 lb 10.2 oz), SpO2 94 %.     Relevant Results        Results for orders placed or performed during the hospital encounter of 02/23/24 (from the past 24 hour(s))   CBC and Auto Differential   Result Value Ref Range     WBC 12.0 (H) 4.4 - 11.3 x10*3/uL     nRBC 0.0 0.0 - 0.0 /100 WBCs     RBC 3.93 (L) 4.00 - 5.20 x10*6/uL     Hemoglobin 10.9 (L) 12.0 - 16.0 g/dL     Hematocrit 36.4 36.0 - 46.0 %     MCV 93 80 - 100 fL     MCH 27.7 26.0 - 34.0 pg     MCHC 29.9 (L) 32.0 - 36.0 g/dL     RDW 19.6 (H) 11.5 - 14.5 %     Platelets 232 150 - 450 x10*3/uL     Neutrophils % 83.7 40.0 - 80.0 %     Immature Granulocytes %, Automated 0.5 0.0 - 0.9 %     Lymphocytes % 7.8 13.0 - 44.0 %     Monocytes % 7.4 2.0 - 10.0 %     Eosinophils % 0.3 0.0 - 6.0 %     Basophils % 0.3 0.0 - 2.0 %     Neutrophils Absolute 10.04 (H) 1.60 - 5.50 x10*3/uL     Immature Granulocytes Absolute, Automated 0.06 0.00 - 0.50 x10*3/uL     Lymphocytes Absolute 0.94 0.80 - 3.00 x10*3/uL     Monocytes Absolute 0.89 (H) 0.05 - 0.80 x10*3/uL     Eosinophils Absolute 0.04 0.00 - 0.40 x10*3/uL     Basophils Absolute 0.04 0.00 - 0.10 x10*3/uL   Comprehensive Metabolic Panel   Result Value Ref Range     Glucose 166 (H) 74 - 99 mg/dL     Sodium 135 (L) 136 - 145 mmol/L     Potassium 4.1 3.5 - 5.3 mmol/L     Chloride 100 98 - 107 mmol/L     Bicarbonate 27 21 - 32 mmol/L     Anion Gap 12 10 - 20 mmol/L     Urea Nitrogen 29 (H) 6 - 23 mg/dL     Creatinine 0.80 0.50 - 1.05 mg/dL     eGFR 78 >60 mL/min/1.73m*2     Calcium 9.1 8.6 - 10.3 mg/dL     Albumin  3.4 3.4 - 5.0 g/dL     Alkaline Phosphatase 102 33 - 136 U/L     Total Protein 8.4 (H) 6.4 - 8.2 g/dL     AST 27 9 - 39 U/L     Bilirubin, Total 0.7 0.0 - 1.2 mg/dL     ALT 18 7 - 45 U/L   Lactate   Result Value Ref Range     Lactate 1.9 0.4 - 2.0 mmol/L   B-Type Natriuretic Peptide   Result Value Ref Range      (H) 0 - 99 pg/mL   Troponin I, High Sensitivity   Result Value Ref Range     Troponin I, High Sensitivity 13 0 - 13 ng/L   Urinalysis with Reflex Culture and Microscopic   Result Value Ref Range     Color, Urine Yellow Straw, Yellow     Appearance, Urine Hazy (N) Clear     Specific Gravity, Urine 1.017 1.005 - 1.035     pH, Urine 8.0 5.0, 5.5, 6.0, 6.5, 7.0, 7.5, 8.0     Protein, Urine >=500 (3+) (N) NEGATIVE mg/dL     Glucose, Urine NEGATIVE NEGATIVE mg/dL     Blood, Urine MODERATE (2+) (A) NEGATIVE     Ketones, Urine NEGATIVE NEGATIVE mg/dL     Bilirubin, Urine NEGATIVE NEGATIVE     Urobilinogen, Urine <2.0 <2.0 mg/dL     Nitrite, Urine NEGATIVE NEGATIVE     Leukocyte Esterase, Urine SMALL (1+) (A) NEGATIVE   Microscopic Only, Urine   Result Value Ref Range     WBC, Urine 21-50 (A) 1-5, NONE /HPF     RBC, Urine >20 (A) NONE, 1-2, 3-5 /HPF     Bacteria, Urine 1+ (A) NONE SEEN /HPF      XR chest 1 view     Result Date: 2/24/2024  STUDY: Chest Radiograph;  02/23/2024 11:02 PM INDICATION: Sepsis, evaluate for source.  COMPARISON: XR chest 12/04/2022.  CT chest/abdomen/pelvis 06/01/2023.  ACCESSION NUMBER(S): [id number] ORDERING CLINICIAN: KENNA ALCALA TECHNIQUE:  Frontal chest was obtained at 2302 hours. FINDINGS: CARDIOMEDIASTINAL SILHOUETTE: Cardiomediastinal silhouette is enlarged in size and configuration. Calcified plaque is seen in the aorta.  LUNGS: Increased interstitial markings are seen bilaterally with subtle patchy right basilar airspace opacity.  ABDOMEN: No remarkable upper abdominal findings.  BONES: No acute osseous changes.  Generalized osseous demineralization is noted.      Cardiomegaly with increased interstitial markings and subtle patchy right infrahilar airspace opacity.  Mild pulmonary edema secondary to CHF is favored although right lower lobe pneumonia is a possibility in the appropriate clinical setting. Signed by Salvatore Parada            Assessment/Plan   Principal Problem:    UTI (urinary tract infection)      73-year-old female with past medical history of Parkinson's, CKD, hypothyroidism, atrial fibrillation on Xarelto, diabetes, hypertension, hyperlipidemia, depression, MI, sacral osteomyelitis, and cholelithiasis, who presented to [facility] ED today via EMS from home for lethargy. EMS stated she had a fever en route. Patient has a albarran catheter that was recently changed on Wednesday.  Continue antibiotics. Patient to be hospitalized for further medical management.      #Suspect sepsis likely 2/2 to pneumonia vs UTI  #Suspect pneumonia RLL  #Suspected UTI likely 2/2 chronic indwelling albarran catheter  #Elevated BNP  #Acute metabolic encephalopathy likely 2/2 suspected UTI/ pneumonia   #Leukocytosis  Admit to OBS/telemetry to Dr. Fitzgerald  Defer consults to attending per request  See imaging results above  Echocardiogram ordered  Continue Azithromycin, Zosyn and vancomycin. De-escalate as appropriate  Strep pneumonia urine and legionella ordered  Sputum culture ordered  Titrate oxygen as needed  Blood and Urine cultures pending  Change albarran catheter  Daily weight  PT/OT  Social work  Repeat labs in AM     #Pressure ulcer of sacral region, stage 4  #Pressure ulcer left hip, stage 1  Continue wound care regimen  Air mattress  Turn q 2 hours     #Acute on chronic anemia  H&H 10.9/36.4  Continue to monitor     Chronic issues  #Parkinson's disease  #CKD  #Hypothyroidism  #Atrial fibrillation  #DMII  #HTN  #HLD  #Depression  #MI  #Cholelithiasis  Continue home medications as appropriate when nursing completes home med rec  SSI with hypoglycemic protocol     #DVT  prophylaxis  Continue home Xarelto  SCDs     I spent 45 minutes in the professional and overall care of this patient.           Addendum patient seen and evaluated agree with all the above to treat her for a urinary tract infection change in mental status history of Parkinson's repeat the CBC and the electrolytes continue with all above management thank you                    Revision History         This patient has a urinary catheter   Reason for the urinary catheter remaining today?           Principal Problem:    UTI (urinary tract infection)  Active Problems:    Urinary tract infection associated with indwelling urethral catheter, initial encounter (CMS/[facility])          Malnutrition     Patient fully evaluated on February 29.  IV antibiotics have been discontinued will monitor overnight.  Recheck labs in AM.  Remeron added for appetite stimulant     I agree with the dietitian's malnutrition diagnosis.     Patient fully evaluated on February 28.  Urine culture results are noted.  Continue present IV antibiotics for additional 24 hours and recheck labs in a.m.  Patient fully evaluated on March 1.  Still with  and significant oral intake.  Family agreeable to PEG placement.  Continue present IV antibiotics for now  Balaji Fitzgerald MD

## 2024-03-01 NOTE — H&P (VIEW-ONLY)
Reason For Consult  PEG    This note was created using voice recognition transcription software. Despite proofreading, unintentional typographical errors may be present. Please contact the GI office with any questions or concerns.       This is a 74 yo Female with a PMH of Parkinson's, CKD, hypothyroidism, HLD, depression, MI, sacral osteomyelitis, aorto bi-iliac stent graft repair of infrarenal aortic aneurysm, and cholelithiasis who presented to [facility] on 2/24/24 with reports of lethargy.  GI was consulted for PEG tube placement.  Patient is lethargic and family was not present at the bedside.          Subjective  Chart check completed.  Per nurse,  feeds her Ensure and popcorn.       EGD-Unknown  Colonoscopy-Unknown  BM-Unknown  FHX-Unknown  SHX-Unknown  Ab Sx-Unknown  NSAIDs- Unknown        Allergies: as mentioned in H&P      A 10 point review of system is negative except for what is mentioned in the HPI    Vital Signs: Reviewed    Physical Exam:  General: Very thin, lethargic  Skin:  Warm and dry, no jaundice, scar on abdomen (unclear etiology)  HEENT: No scleral icterus, no conjunctival pallor, normocephalic, atraumatic, mucous membranes moist  Neck:  atraumatic, trachea midline, no JVD  Chest:  Clear to auscultation bilaterally. No wheezes, rales, or rhonchi  CV:  Regular rate and rhythm.  Positive S1/S2  Abdomen: no distension, +BS, soft, non-tender to palpation, no rebound tenderness, no guarding, no rigidity, no discernible ascites   Extremities: no lower extremity edema, chronic pigmentary changes, no cyanosis  Neurological:  Lethargic  Psychiatric: Lethargic     Investigations:  Labs, radiological imaging and cardiac work up were reviewed      Objective:         2/29/2024     3:34 PM 2/29/2024     7:59 PM 2/29/2024    11:44 PM 3/1/2024     5:31 AM 3/1/2024     7:00 AM 3/1/2024     7:42 AM 3/1/2024    11:09 AM   Vitals   Systolic 138 154 162 135  142 147   Diastolic 77 74 80 73  73 76   Heart Rate  87 88 80 70  75 89   Temp 36.8 °C (98.2 °F) 36.9 °C (98.4 °F) 36.4 °C (97.5 °F) 36.5 °C (97.7 °F)  36.9 °C (98.4 °F) 37.4 °C (99.3 °F)   Resp 16 16 16 16  20 20   Weight (lb)     117.95     BMI     19.05 kg/m2     BSA (m2)     1.58 m2            Medications:  calcitriol, 0.25 mcg, oral, Once per day on Mon Wed Fri  carbidopa-levodopa, 0.5 tablet, oral, TID  cholecalciferol, 1,000 Units, oral, Daily  gabapentin, 100 mg, oral, TID  gentamicin, , Topical, TID  insulin lispro, 0-10 Units, subcutaneous, TID with meals  levothyroxine, 88 mcg, oral, Daily before breakfast  metoprolol tartrate, 25 mg, oral, BID  mirtazapine, 15 mg, oral, Nightly  mupirocin, 1 Application, Topical, BID  oxyCODONE-acetaminophen, 1 tablet, oral, TID  pantoprazole, 40 mg, intravenous, Daily  perflutren lipid microspheres, 0.5-10 mL of dilution, intravenous, Once in imaging  perflutren protein A microsphere, 0.5 mL, intravenous, Once in imaging  polyethylene glycol, 17 g, oral, Daily  pramoxine-calamine, 1 Application, topical (top), BID  [Held by provider] rivaroxaban, 20 mg, oral, Daily  rosuvastatin, 40 mg, oral, Daily  sodium hypochlorite, , irrigation, TID  sulfur hexafluoride microsphr, 2 mL, intravenous, Once in imaging         Recent Results (from the past 72 hour(s))   POCT GLUCOSE    Collection Time: 02/27/24  4:46 PM   Result Value Ref Range    POCT Glucose 202 (H) 74 - 99 mg/dL   POCT GLUCOSE    Collection Time: 02/27/24  7:39 PM   Result Value Ref Range    POCT Glucose 210 (H) 74 - 99 mg/dL   POCT GLUCOSE    Collection Time: 02/28/24  6:06 AM   Result Value Ref Range    POCT Glucose 132 (H) 74 - 99 mg/dL   POCT GLUCOSE    Collection Time: 02/28/24 11:31 AM   Result Value Ref Range    POCT Glucose 160 (H) 74 - 99 mg/dL   POCT GLUCOSE    Collection Time: 02/28/24  4:37 PM   Result Value Ref Range    POCT Glucose 79 74 - 99 mg/dL   POCT GLUCOSE    Collection Time: 02/28/24  8:43 PM   Result Value Ref Range    POCT Glucose 155 (H) 74 -  99 mg/dL   CBC    Collection Time: 02/29/24  4:53 AM   Result Value Ref Range    WBC 7.5 4.4 - 11.3 x10*3/uL    nRBC 0.0 0.0 - 0.0 /100 WBCs    RBC 3.77 (L) 4.00 - 5.20 x10*6/uL    Hemoglobin 10.4 (L) 12.0 - 16.0 g/dL    Hematocrit 34.4 (L) 36.0 - 46.0 %    MCV 91 80 - 100 fL    MCH 27.6 26.0 - 34.0 pg    MCHC 30.2 (L) 32.0 - 36.0 g/dL    RDW 18.6 (H) 11.5 - 14.5 %    Platelets 293 150 - 450 x10*3/uL   Comprehensive Metabolic Panel    Collection Time: 02/29/24  4:53 AM   Result Value Ref Range    Glucose 101 (H) 74 - 99 mg/dL    Sodium 136 136 - 145 mmol/L    Potassium 3.9 3.5 - 5.3 mmol/L    Chloride 103 98 - 107 mmol/L    Bicarbonate 22 21 - 32 mmol/L    Anion Gap 15 10 - 20 mmol/L    Urea Nitrogen 32 (H) 6 - 23 mg/dL    Creatinine 0.95 0.50 - 1.05 mg/dL    eGFR 63 >60 mL/min/1.73m*2    Calcium 8.9 8.6 - 10.3 mg/dL    Albumin 3.1 (L) 3.4 - 5.0 g/dL    Alkaline Phosphatase 70 33 - 136 U/L    Total Protein 7.2 6.4 - 8.2 g/dL    AST 23 9 - 39 U/L    Bilirubin, Total 0.6 0.0 - 1.2 mg/dL    ALT 8 7 - 45 U/L   POCT GLUCOSE    Collection Time: 02/29/24  6:27 AM   Result Value Ref Range    POCT Glucose 111 (H) 74 - 99 mg/dL   POCT GLUCOSE    Collection Time: 02/29/24 11:23 AM   Result Value Ref Range    POCT Glucose 161 (H) 74 - 99 mg/dL   POCT GLUCOSE    Collection Time: 02/29/24  4:58 PM   Result Value Ref Range    POCT Glucose 126 (H) 74 - 99 mg/dL   POCT GLUCOSE    Collection Time: 02/29/24  8:07 PM   Result Value Ref Range    POCT Glucose 159 (H) 74 - 99 mg/dL   CBC    Collection Time: 03/01/24  4:56 AM   Result Value Ref Range    WBC 8.4 4.4 - 11.3 x10*3/uL    nRBC 0.0 0.0 - 0.0 /100 WBCs    RBC 3.80 (L) 4.00 - 5.20 x10*6/uL    Hemoglobin 10.7 (L) 12.0 - 16.0 g/dL    Hematocrit 35.3 (L) 36.0 - 46.0 %    MCV 93 80 - 100 fL    MCH 28.2 26.0 - 34.0 pg    MCHC 30.3 (L) 32.0 - 36.0 g/dL    RDW 18.8 (H) 11.5 - 14.5 %    Platelets 325 150 - 450 x10*3/uL   Comprehensive Metabolic Panel    Collection Time: 03/01/24  4:56 AM    Result Value Ref Range    Glucose 120 (H) 74 - 99 mg/dL    Sodium 138 136 - 145 mmol/L    Potassium 3.9 3.5 - 5.3 mmol/L    Chloride 105 98 - 107 mmol/L    Bicarbonate 23 21 - 32 mmol/L    Anion Gap 14 10 - 20 mmol/L    Urea Nitrogen 37 (H) 6 - 23 mg/dL    Creatinine 0.92 0.50 - 1.05 mg/dL    eGFR 66 >60 mL/min/1.73m*2    Calcium 8.8 8.6 - 10.3 mg/dL    Albumin 3.2 (L) 3.4 - 5.0 g/dL    Alkaline Phosphatase 73 33 - 136 U/L    Total Protein 7.2 6.4 - 8.2 g/dL    AST 22 9 - 39 U/L    Bilirubin, Total 0.4 0.0 - 1.2 mg/dL    ALT 9 7 - 45 U/L   POCT GLUCOSE    Collection Time: 03/01/24  6:36 AM   Result Value Ref Range    POCT Glucose 110 (H) 74 - 99 mg/dL   POCT GLUCOSE    Collection Time: 03/01/24 11:10 AM   Result Value Ref Range    POCT Glucose 123 (H) 74 - 99 mg/dL          Assessment:  This is a 72 yo Female with a PMH of Parkinson's, CKD, hypothyroidism, HLD, depression, MI, sacral osteomyelitis, aorto bi-iliac stent graft repair of infrarenal aortic aneurysm, and cholelithiasis who presented to [facility] on 2/24/24 with reports of lethargy.  GI was consulted for PEG tube placement.          Plan  1.)  PEG tube placement-Will speak to family over the weekend or Monday.      Discussed patient with Dr. Medina.    I spent 30 minutes in the professional and overall care of this patient.      03/01/24 at 3:16 PM - ACE Alejandro-CNP   Patient seen and examined surprisingly she was quite alert and oriented.  She knew she was in the hospital and was able to give me her date of birth.  Indeed the nurse was able to feed a whole glass of liquids and she drank it without any difficulty.  The patient appears very cachectic.  She denied any dysphagia or heartburn or abdominal pain chief she claims her appetite is so and so.  Her knees are bent permanently.  The abdominal exam shows no scars or masses.  I reviewed the patient's CT scan which shows nothing in between the stomach wall and the anterior wall and  therefore there should be no contraindication to do a PEG if it is needed.   The patient was taking Percocet 3 times a day and was very drowsy early this morning.  The Percocet has been withheld and an level of alertness has become normal.  I discussed about a PEG insertion the patient and the patient flatly refused.  As already noted the patient is alert and oriented x 3.   I will sign off the case for the time being and be back if requested

## 2024-03-01 NOTE — CARE PLAN
The patient's goals for the shift include      The clinical goals for the shift include Oral intake >50%      Problem: Skin  Goal: Decreased wound size/increased tissue granulation at next dressing change  Outcome: Progressing  Flowsheets (Taken 3/1/2024 1653)  Decreased wound size/increased tissue granulation at next dressing change: Promote sleep for wound healing  Goal: Participates in plan/prevention/treatment measures  Outcome: Progressing  Flowsheets (Taken 3/1/2024 1653)  Participates in plan/prevention/treatment measures: Elevate heels  Goal: Prevent/manage excess moisture  Outcome: Progressing  Flowsheets (Taken 3/1/2024 1653)  Prevent/manage excess moisture: Cleanse incontinence/protect with barrier cream  Goal: Prevent/minimize sheer/friction injuries  Outcome: Progressing  Flowsheets (Taken 3/1/2024 1653)  Prevent/minimize sheer/friction injuries: Turn/reposition every 2 hours/use positioning/transfer devices  Goal: Promote/optimize nutrition  Outcome: Progressing  Flowsheets (Taken 3/1/2024 1653)  Promote/optimize nutrition: Assist with feeding  Goal: Promote skin healing  Outcome: Progressing  Flowsheets (Taken 3/1/2024 1653)  Promote skin healing: Rotate device position/do not position patient on device     Problem: Pain - Adult  Goal: Verbalizes/displays adequate comfort level or baseline comfort level  Outcome: Progressing     Problem: Safety - Adult  Goal: Free from fall injury  Outcome: Progressing     Problem: Discharge Planning  Goal: Discharge to home or other facility with appropriate resources  Outcome: Progressing     Problem: Chronic Conditions and Co-morbidities  Goal: Patient's chronic conditions and co-morbidity symptoms are monitored and maintained or improved  Outcome: Progressing     Problem: Diabetes  Goal: Achieve decreasing blood glucose levels by end of shift  Outcome: Progressing  Goal: Increase stability of blood glucose readings by end of shift  Outcome: Progressing  Goal:  Decrease in ketones present in urine by end of shift  Outcome: Progressing  Goal: Maintain electrolyte levels within acceptable range throughout shift  Outcome: Progressing  Goal: Maintain glucose levels >70mg/dl to <250mg/dl throughout shift  Outcome: Progressing  Goal: No changes in neurological exam by end of shift  Outcome: Progressing  Goal: Learn about and adhere to nutrition recommendations by end of shift  Outcome: Progressing  Goal: Vital signs within normal range for age by end of shift  Outcome: Progressing  Goal: Increase self care and/or family involovement by end of shift  Outcome: Progressing  Goal: Receive DSME education by end of shift  Outcome: Progressing     Problem: Pain  Goal: Takes deep breaths with improved pain control throughout the shift  Outcome: Progressing  Goal: Turns in bed with improved pain control throughout the shift  Outcome: Progressing  Goal: Walks with improved pain control throughout the shift  Outcome: Progressing  Goal: Performs ADL's with improved pain control throughout shift  Outcome: Progressing  Goal: Participates in PT with improved pain control throughout the shift  Outcome: Progressing  Goal: Free from opioid side effects throughout the shift  Outcome: Progressing  Goal: Free from acute confusion related to pain meds throughout the shift  Outcome: Progressing

## 2024-03-01 NOTE — CARE PLAN
The patient's goals for the shift include      The clinical goals for the shift include patient will rest comfortably    Over the shift, the patient did  make progress toward the following goals. Barriers to progression include none. Recommendations to address these barriers include none.

## 2024-03-01 NOTE — CONSULTS
Reason For Consult  PEG    This note was created using voice recognition transcription software. Despite proofreading, unintentional typographical errors may be present. Please contact the GI office with any questions or concerns.       This is a 74 yo Female with a PMH of Parkinson's, CKD, hypothyroidism, HLD, depression, MI, sacral osteomyelitis, aorto bi-iliac stent graft repair of infrarenal aortic aneurysm, and cholelithiasis who presented to [facility] on 2/24/24 with reports of lethargy.  GI was consulted for PEG tube placement.  Patient is lethargic and family was not present at the bedside.          Subjective  Chart check completed.  Per nurse,  feeds her Ensure and popcorn.       EGD-Unknown  Colonoscopy-Unknown  BM-Unknown  FHX-Unknown  SHX-Unknown  Ab Sx-Unknown  NSAIDs- Unknown        Allergies: as mentioned in H&P      A 10 point review of system is negative except for what is mentioned in the HPI    Vital Signs: Reviewed    Physical Exam:  General: Very thin, lethargic  Skin:  Warm and dry, no jaundice, scar on abdomen (unclear etiology)  HEENT: No scleral icterus, no conjunctival pallor, normocephalic, atraumatic, mucous membranes moist  Neck:  atraumatic, trachea midline, no JVD  Chest:  Clear to auscultation bilaterally. No wheezes, rales, or rhonchi  CV:  Regular rate and rhythm.  Positive S1/S2  Abdomen: no distension, +BS, soft, non-tender to palpation, no rebound tenderness, no guarding, no rigidity, no discernible ascites   Extremities: no lower extremity edema, chronic pigmentary changes, no cyanosis  Neurological:  Lethargic  Psychiatric: Lethargic     Investigations:  Labs, radiological imaging and cardiac work up were reviewed      Objective:         2/29/2024     3:34 PM 2/29/2024     7:59 PM 2/29/2024    11:44 PM 3/1/2024     5:31 AM 3/1/2024     7:00 AM 3/1/2024     7:42 AM 3/1/2024    11:09 AM   Vitals   Systolic 138 154 162 135  142 147   Diastolic 77 74 80 73  73 76   Heart Rate  87 88 80 70  75 89   Temp 36.8 °C (98.2 °F) 36.9 °C (98.4 °F) 36.4 °C (97.5 °F) 36.5 °C (97.7 °F)  36.9 °C (98.4 °F) 37.4 °C (99.3 °F)   Resp 16 16 16 16  20 20   Weight (lb)     117.95     BMI     19.05 kg/m2     BSA (m2)     1.58 m2            Medications:  calcitriol, 0.25 mcg, oral, Once per day on Mon Wed Fri  carbidopa-levodopa, 0.5 tablet, oral, TID  cholecalciferol, 1,000 Units, oral, Daily  gabapentin, 100 mg, oral, TID  gentamicin, , Topical, TID  insulin lispro, 0-10 Units, subcutaneous, TID with meals  levothyroxine, 88 mcg, oral, Daily before breakfast  metoprolol tartrate, 25 mg, oral, BID  mirtazapine, 15 mg, oral, Nightly  mupirocin, 1 Application, Topical, BID  oxyCODONE-acetaminophen, 1 tablet, oral, TID  pantoprazole, 40 mg, intravenous, Daily  perflutren lipid microspheres, 0.5-10 mL of dilution, intravenous, Once in imaging  perflutren protein A microsphere, 0.5 mL, intravenous, Once in imaging  polyethylene glycol, 17 g, oral, Daily  pramoxine-calamine, 1 Application, topical (top), BID  [Held by provider] rivaroxaban, 20 mg, oral, Daily  rosuvastatin, 40 mg, oral, Daily  sodium hypochlorite, , irrigation, TID  sulfur hexafluoride microsphr, 2 mL, intravenous, Once in imaging         Recent Results (from the past 72 hour(s))   POCT GLUCOSE    Collection Time: 02/27/24  4:46 PM   Result Value Ref Range    POCT Glucose 202 (H) 74 - 99 mg/dL   POCT GLUCOSE    Collection Time: 02/27/24  7:39 PM   Result Value Ref Range    POCT Glucose 210 (H) 74 - 99 mg/dL   POCT GLUCOSE    Collection Time: 02/28/24  6:06 AM   Result Value Ref Range    POCT Glucose 132 (H) 74 - 99 mg/dL   POCT GLUCOSE    Collection Time: 02/28/24 11:31 AM   Result Value Ref Range    POCT Glucose 160 (H) 74 - 99 mg/dL   POCT GLUCOSE    Collection Time: 02/28/24  4:37 PM   Result Value Ref Range    POCT Glucose 79 74 - 99 mg/dL   POCT GLUCOSE    Collection Time: 02/28/24  8:43 PM   Result Value Ref Range    POCT Glucose 155 (H) 74 -  99 mg/dL   CBC    Collection Time: 02/29/24  4:53 AM   Result Value Ref Range    WBC 7.5 4.4 - 11.3 x10*3/uL    nRBC 0.0 0.0 - 0.0 /100 WBCs    RBC 3.77 (L) 4.00 - 5.20 x10*6/uL    Hemoglobin 10.4 (L) 12.0 - 16.0 g/dL    Hematocrit 34.4 (L) 36.0 - 46.0 %    MCV 91 80 - 100 fL    MCH 27.6 26.0 - 34.0 pg    MCHC 30.2 (L) 32.0 - 36.0 g/dL    RDW 18.6 (H) 11.5 - 14.5 %    Platelets 293 150 - 450 x10*3/uL   Comprehensive Metabolic Panel    Collection Time: 02/29/24  4:53 AM   Result Value Ref Range    Glucose 101 (H) 74 - 99 mg/dL    Sodium 136 136 - 145 mmol/L    Potassium 3.9 3.5 - 5.3 mmol/L    Chloride 103 98 - 107 mmol/L    Bicarbonate 22 21 - 32 mmol/L    Anion Gap 15 10 - 20 mmol/L    Urea Nitrogen 32 (H) 6 - 23 mg/dL    Creatinine 0.95 0.50 - 1.05 mg/dL    eGFR 63 >60 mL/min/1.73m*2    Calcium 8.9 8.6 - 10.3 mg/dL    Albumin 3.1 (L) 3.4 - 5.0 g/dL    Alkaline Phosphatase 70 33 - 136 U/L    Total Protein 7.2 6.4 - 8.2 g/dL    AST 23 9 - 39 U/L    Bilirubin, Total 0.6 0.0 - 1.2 mg/dL    ALT 8 7 - 45 U/L   POCT GLUCOSE    Collection Time: 02/29/24  6:27 AM   Result Value Ref Range    POCT Glucose 111 (H) 74 - 99 mg/dL   POCT GLUCOSE    Collection Time: 02/29/24 11:23 AM   Result Value Ref Range    POCT Glucose 161 (H) 74 - 99 mg/dL   POCT GLUCOSE    Collection Time: 02/29/24  4:58 PM   Result Value Ref Range    POCT Glucose 126 (H) 74 - 99 mg/dL   POCT GLUCOSE    Collection Time: 02/29/24  8:07 PM   Result Value Ref Range    POCT Glucose 159 (H) 74 - 99 mg/dL   CBC    Collection Time: 03/01/24  4:56 AM   Result Value Ref Range    WBC 8.4 4.4 - 11.3 x10*3/uL    nRBC 0.0 0.0 - 0.0 /100 WBCs    RBC 3.80 (L) 4.00 - 5.20 x10*6/uL    Hemoglobin 10.7 (L) 12.0 - 16.0 g/dL    Hematocrit 35.3 (L) 36.0 - 46.0 %    MCV 93 80 - 100 fL    MCH 28.2 26.0 - 34.0 pg    MCHC 30.3 (L) 32.0 - 36.0 g/dL    RDW 18.8 (H) 11.5 - 14.5 %    Platelets 325 150 - 450 x10*3/uL   Comprehensive Metabolic Panel    Collection Time: 03/01/24  4:56 AM    Result Value Ref Range    Glucose 120 (H) 74 - 99 mg/dL    Sodium 138 136 - 145 mmol/L    Potassium 3.9 3.5 - 5.3 mmol/L    Chloride 105 98 - 107 mmol/L    Bicarbonate 23 21 - 32 mmol/L    Anion Gap 14 10 - 20 mmol/L    Urea Nitrogen 37 (H) 6 - 23 mg/dL    Creatinine 0.92 0.50 - 1.05 mg/dL    eGFR 66 >60 mL/min/1.73m*2    Calcium 8.8 8.6 - 10.3 mg/dL    Albumin 3.2 (L) 3.4 - 5.0 g/dL    Alkaline Phosphatase 73 33 - 136 U/L    Total Protein 7.2 6.4 - 8.2 g/dL    AST 22 9 - 39 U/L    Bilirubin, Total 0.4 0.0 - 1.2 mg/dL    ALT 9 7 - 45 U/L   POCT GLUCOSE    Collection Time: 03/01/24  6:36 AM   Result Value Ref Range    POCT Glucose 110 (H) 74 - 99 mg/dL   POCT GLUCOSE    Collection Time: 03/01/24 11:10 AM   Result Value Ref Range    POCT Glucose 123 (H) 74 - 99 mg/dL          Assessment:  This is a 72 yo Female with a PMH of Parkinson's, CKD, hypothyroidism, HLD, depression, MI, sacral osteomyelitis, aorto bi-iliac stent graft repair of infrarenal aortic aneurysm, and cholelithiasis who presented to [facility] on 2/24/24 with reports of lethargy.  GI was consulted for PEG tube placement.          Plan  1.)  PEG tube placement-Will speak to family over the weekend or Monday.      Discussed patient with Dr. Medina.    I spent 30 minutes in the professional and overall care of this patient.      03/01/24 at 3:16 PM - ACE Alejandro-CNP   Patient seen and examined surprisingly she was quite alert and oriented.  She knew she was in the hospital and was able to give me her date of birth.  Indeed the nurse was able to feed a whole glass of liquids and she drank it without any difficulty.  The patient appears very cachectic.  She denied any dysphagia or heartburn or abdominal pain chief she claims her appetite is so and so.  Her knees are bent permanently.  The abdominal exam shows no scars or masses.  I reviewed the patient's CT scan which shows nothing in between the stomach wall and the anterior wall and  therefore there should be no contraindication to do a PEG if it is needed.   The patient was taking Percocet 3 times a day and was very drowsy early this morning.  The Percocet has been withheld and an level of alertness has become normal.  I discussed about a PEG insertion the patient and the patient flatly refused.  As already noted the patient is alert and oriented x 3.   I will sign off the case for the time being and be back if requested

## 2024-03-01 NOTE — NURSING NOTE
Notified Dr. Fitzgerald that patient was too sleepy but arousable with painful stimuli.  Percocet and Gabapentin evening dose not given.  Received the order for Narcan 0.4 mg IV once.    At 1815 reported Dr. Fitzgerald back that patient was more awake.    Received the order to discontinue the schedule Percocet PO TID and to order Tramadol 50 mg Q6hr PRN instead.

## 2024-03-02 NOTE — CARE PLAN
The patient's goals for the shift include      The clinical goals for the shift include Consume each meal > 50%      Problem: Skin  Goal: Decreased wound size/increased tissue granulation at next dressing change  Outcome: Progressing  Flowsheets (Taken 3/2/2024 1151)  Decreased wound size/increased tissue granulation at next dressing change: Promote sleep for wound healing  Goal: Participates in plan/prevention/treatment measures  Outcome: Progressing  Flowsheets (Taken 3/2/2024 1151)  Participates in plan/prevention/treatment measures: Elevate heels  Goal: Prevent/manage excess moisture  Outcome: Progressing  Flowsheets (Taken 3/2/2024 1151)  Prevent/manage excess moisture: Cleanse incontinence/protect with barrier cream  Goal: Prevent/minimize sheer/friction injuries  Outcome: Progressing  Flowsheets (Taken 3/2/2024 1151)  Prevent/minimize sheer/friction injuries:   Turn/reposition every 2 hours/use positioning/transfer devices   HOB 30 degrees or less  Goal: Promote/optimize nutrition  Outcome: Progressing  Flowsheets (Taken 3/2/2024 1151)  Promote/optimize nutrition: Assist with feeding  Goal: Promote skin healing  Outcome: Progressing  Flowsheets (Taken 3/2/2024 1151)  Promote skin healing: Rotate device position/do not position patient on device     Problem: Pain - Adult  Goal: Verbalizes/displays adequate comfort level or baseline comfort level  Outcome: Progressing     Problem: Safety - Adult  Goal: Free from fall injury  Outcome: Progressing     Problem: Discharge Planning  Goal: Discharge to home or other facility with appropriate resources  Outcome: Progressing     Problem: Chronic Conditions and Co-morbidities  Goal: Patient's chronic conditions and co-morbidity symptoms are monitored and maintained or improved  Outcome: Progressing     Problem: Diabetes  Goal: Achieve decreasing blood glucose levels by end of shift  Outcome: Progressing  Goal: Increase stability of blood glucose readings by end of  shift  Outcome: Progressing  Goal: Decrease in ketones present in urine by end of shift  Outcome: Progressing  Goal: Maintain electrolyte levels within acceptable range throughout shift  Outcome: Progressing  Goal: Maintain glucose levels >70mg/dl to <250mg/dl throughout shift  Outcome: Progressing  Goal: No changes in neurological exam by end of shift  Outcome: Progressing  Goal: Learn about and adhere to nutrition recommendations by end of shift  Outcome: Progressing  Goal: Vital signs within normal range for age by end of shift  Outcome: Progressing  Goal: Increase self care and/or family involovement by end of shift  Outcome: Progressing  Goal: Receive DSME education by end of shift  Outcome: Progressing     Problem: Pain  Goal: Takes deep breaths with improved pain control throughout the shift  Outcome: Progressing  Goal: Turns in bed with improved pain control throughout the shift  Outcome: Progressing  Goal: Walks with improved pain control throughout the shift  Outcome: Progressing  Goal: Performs ADL's with improved pain control throughout shift  Outcome: Progressing  Goal: Participates in PT with improved pain control throughout the shift  Outcome: Progressing  Goal: Free from opioid side effects throughout the shift  Outcome: Progressing  Goal: Free from acute confusion related to pain meds throughout the shift  Outcome: Progressing     Problem: Fall/Injury  Goal: Not fall by end of shift  Outcome: Progressing  Goal: Be free from injury by end of the shift  Outcome: Progressing

## 2024-03-02 NOTE — PROGRESS NOTES
Irina Stratton is a 73 y.o. female on day 7 of admission presenting with UTI (urinary tract infection).      Subjective   Patient fully evaluated on February 28 and clinically improved.       Objective     Last Recorded Vitals  /76   Pulse 87   Temp 37 °C (98.6 °F)   Resp 18   Wt 51.5 kg (113 lb 8.6 oz)   SpO2 92%   Intake/Output last 3 Shifts:    Intake/Output Summary (Last 24 hours) at 3/2/2024 1421  Last data filed at 3/2/2024 1230  Gross per 24 hour   Intake 1360 ml   Output 900 ml   Net 460 ml         Admission Weight  Weight: 52 kg (114 lb 10.2 oz) (02/24/24 0300)    Daily Weight  03/02/24 : 51.5 kg (113 lb 8.6 oz)    Image Results  Transthoracic Echo (TTE) Complete      [facility], [address] [place] [id number] [phone] and                                  Fax [phone]    TRANSTHORACIC ECHOCARDIOGRAM REPORT       Patient Name:      IRINA STRATTON     Reading Physician:    [id number] Garrett Daley MD  Study Date:        2/25/2024            Ordering Provider:    [id number] PASTOR HEARD  MRN/PID:           [id number]             Fellow:  Accession#:        [id number]         Nurse:  Date of Birth/Age: 1950 / 73 years Sonographer:          Sonam Martinez RDCS  Gender:            F                    Additional Staff:  Height:            167.00 cm            Admit Date:           2/24/2024  Weight:            56.00 kg             Admission Status:     Inpatient -                                                                Priority discharge  BSA / BMI:         1.62 m2 / 20.08      Encounter#:           [id number]                     kg/m2                                          Department Location:  [facility]  Blood Pressure: 127 /59 mmHg    Study Type:    TRANSTHORACIC ECHO (TTE) COMPLETE  Diagnosis/ICD: Elevated  Troponin-R79.89  Indication:    Elevated Troponin  CPT Code:      Echo Complete w Full Doppler-32304    Patient History:  Pertinent History: A-Fib, AAA and HTN.    Study Detail: The following Echo studies were performed: 2D, M-Mode, Doppler and                color flow. Technically challenging study due to prominent lung                artifact.       PHYSICIAN INTERPRETATION:  Left Ventricle: The left ventricular systolic function is low normal, with an estimated ejection fraction of 50%. There are no regional wall motion abnormalities. The left ventricular cavity size is normal. Spectral Doppler shows an impaired relaxation pattern of left ventricular diastolic filling.  Left Atrium: The left atrium is upper limits of normal in size.  Right Ventricle: The right ventricle is normal in size. There is normal right ventricular global systolic function.  Right Atrium: The right atrium is normal in size.  Aortic Valve: The aortic valve is trileaflet. There is evidence of mild aortic valve stenosis.  There is no evidence of aortic valve regurgitation. The peak instantaneous gradient of the aortic valve is 11.3 mmHg. The mean gradient of the aortic valve is 6.0 mmHg.  Mitral Valve: The mitral valve is normal in structure. There is mild mitral valve regurgitation.  Tricuspid Valve: The tricuspid valve is structurally normal. No evidence of tricuspid regurgitation.  Pulmonic Valve: The pulmonic valve is structurally normal. There is no indication of pulmonic valve regurgitation.  Pericardium: There is no pericardial effusion noted.  Aorta: The aortic root is normal.       CONCLUSIONS:   1. Left ventricular systolic function is low normal with a 50% estimated ejection fraction.   2. Spectral Doppler shows an impaired relaxation pattern of left ventricular diastolic filling.   3. Mild aortic valve stenosis.    QUANTITATIVE DATA SUMMARY:  2D MEASUREMENTS:                            Normal Ranges:  IVSd:          1.05 cm     (0.6-1.1cm)  LVPWd:         1.02 cm    (0.6-1.1cm)  LVIDd:         4.78 cm    (3.9-5.9cm)  LVIDs:         3.27 cm  LV Mass Index: 109.0 g/m2  LV % FS        31.6 %    AORTA MEASUREMENTS:                     Normal Ranges:  Asc Ao, d: 3.20 cm (2.1-3.4cm)    LV SYSTOLIC FUNCTION BY 2D PLANIMETRY (MOD):                      Normal Ranges:  EF-A4C View: 52.0 % (>=55%)  EF-A2C View: 45.9 %  EF-Biplane:  49.7 %    LV DIASTOLIC FUNCTION:                         Normal Ranges:  MV Peak E:    0.93 m/s (0.7-1.2 m/s)  MV Peak A:    0.92 m/s (0.42-0.7 m/s)  E/A Ratio:    1.01     (1.0-2.2)  MV lateral e' 0.09 m/s  MV medial e'  0.08 m/s    MITRAL VALVE:                  Normal Ranges:  MV DT: 218 msec (150-240msec)    AORTIC VALVE:                                     Normal Ranges:  AoV Vmax:                1.68 m/s  (<=1.7m/s)  AoV Peak P.3 mmHg (<20mmHg)  AoV Mean P.0 mmHg  (1.7-11.5mmHg)  LVOT Max Oscar:            0.63 m/s  (<=1.1m/s)  AoV VTI:                 41.50 cm  (18-25cm)  LVOT VTI:                15.70 cm  LVOT Diameter:           2.20 cm   (1.8-2.4cm)  AoV Area, VTI:           1.44 cm2  (2.5-5.5cm2)  AoV Area,Vmax:           1.42 cm2  (2.5-4.5cm2)  AoV Dimensionless Index: 0.38       RIGHT VENTRICLE:  RV Basal 2.39 cm  RV Mid   1.60 cm  RV Major 7.0 cm  TAPSE:   20.5 mm  RV s'    0.16 m/s    TRICUSPID VALVE/RVSP:                              Normal Ranges:  Peak TR Velocity: 2.36 m/s  RV Syst Pressure: 25.3 mmHg (< 30mmHg)    PULMONIC VALVE:                       Normal Ranges:  PV Max Oscar: 0.8 m/s  (0.6-0.9m/s)  PV Max P.3 mmHg  PV Mean P.0 mmHg  PV VTI:     18.50 cm       [id number] Garrett Daley MD  Electronically signed on 2024 at 1:51:45 PM       ** Final **      Physical Exam    Relevant Results               Assessment/Plan         H&P     Addendum     Date of Service: 2024  5:46 AM     Addendum       Expand All Collapse All    History Of Present Illness  Irina JARRELL  Viral is a 73 y.o. female with past medical history of Parkinson's, CKD, hypothyroidism, atrial fibrillation on Xarelto, diabetes, hypertension, hyperlipidemia, depression, MI, sacral osteomyelitis, and cholelithiasis, who presented to [facility] ED today via EMS from home for lethargy. EMS stated she had a fever en route. Patient has a albarran catheter that was recently changed on Wednesday.  stated patient has been more lethargic than normal. He states she does not communicate much and is non-ambulatory at baseline. Additional history obtained from chart.     ED course: Temp 37.5C, /71, HR 96, RR 20, 95% RA. EKG unavailable for my review. Labs: WBC 12.0, neutrophils 10.04. RBC 3.98, H&H 10.9/36.4.[id number]. Sodium 135. BUN 29. . Troponin 13. UA: hazy, 3+protein, moderate blood, small leukocytes, 1+bacteria, RBC>20, WBC 21-50. Urine and blood cultures collected. See imaging results below. Azithromycin, zosyn, vancomycin started in ED. Patient will be admitted under the care of Dr. Fitzgerald who will continue to follow. I was asked to H&P and place initial admission orders.  Past Medical History  Medical History        Past Medical History:   Diagnosis Date    Abnormal radiologic findings on diagnostic imaging of right kidney       Abnormal ultrasound of both kidneys    Personal history of other medical treatment       History of echocardiogram            Surgical History  Surgical History         Past Surgical History:   Procedure Laterality Date     SECTION, CLASSIC   2016      Section    CT ABDOMEN PELVIS ANGIOGRAM W AND/OR WO IV CONTRAST   2019     CT ABDOMEN PELVIS ANGIOGRAM W AND/OR WO IV CONTRAST 2019 [facility]    CT AORTA AND BILATERAL ILIOFEMORAL RUNOFF ANGIOGRAM W AND/OR WO IV CONTRAST   2020     CT AORTA AND BILATERAL ILIOFEMORAL RUNOFF ANGIOGRAM W AND/OR WO IV CONTRAST 2020 [facility]ICAL LEGAC[facility]    OTHER SURGICAL HISTORY   2020      History of prior surgery    OTHER SURGICAL HISTORY   01/31/2020     Coronary artery stent placement    OTHER SURGICAL HISTORY   06/11/2020     Abdominal aortic aneurysm repair endovascular    OTHER SURGICAL HISTORY   01/31/2020     Cardiac catheterization    OTHER SURGICAL HISTORY   01/17/2019     Renal angioplasty and stenting            Social History  She reports that she quit smoking about 13 months ago. Her smoking use included cigarettes. She has never used smokeless tobacco. She reports that she does not currently use alcohol. She reports that she does not use drugs. Lives with . Bedbound.     Family History  Family History          Family History   Problem Relation Name Age of Onset    Stroke Mother                Allergies  Patient has no known allergies.     Review of Systems   Limited ROS due to patient mentation.  Physical Exam  Constitutional:       Appearance: She is ill-appearing.      Comments: Cachetic appearance   HENT:      Head: Normocephalic and atraumatic.      Nose: Nose normal.      Mouth/Throat:      Mouth: Mucous membranes are moist.      Pharynx: Oropharynx is clear.      Comments: Poor dentition  Eyes:      Extraocular Movements: Extraocular movements intact.      Conjunctiva/sclera: Conjunctivae normal.      Pupils: Pupils are equal, round, and reactive to light.   Cardiovascular:      Rate and Rhythm: Normal rate and regular rhythm.      Pulses: Normal pulses.      Heart sounds: Normal heart sounds.   Pulmonary:      Effort: Pulmonary effort is normal.      Breath sounds: Normal breath sounds.   Abdominal:      General: Abdomen is flat. Bowel sounds are normal.      Palpations: Abdomen is soft.   Genitourinary:     Comments: Rain in place  Musculoskeletal:         General: Normal range of motion.      Cervical back: Normal range of motion and neck supple.   Skin:     General: Skin is warm and dry.      Capillary Refill: Capillary refill takes 2 to 3 seconds.      Comments:  Dressing to sacrum   Neurological:      General: No focal deficit present.      Mental Status: She is alert. She is disoriented.      Comments: Answered some questions and followed some commands. Confusion noted.   Psychiatric:         Mood and Affect: Mood normal.            Last Recorded Vitals  Blood pressure 140/70, pulse 98, temperature 37.5 °C (99.5 °F), temperature source Temporal, resp. rate 18, weight 52 kg (114 lb 10.2 oz), SpO2 94 %.     Relevant Results        Results for orders placed or performed during the hospital encounter of 02/23/24 (from the past 24 hour(s))   CBC and Auto Differential   Result Value Ref Range     WBC 12.0 (H) 4.4 - 11.3 x10*3/uL     nRBC 0.0 0.0 - 0.0 /100 WBCs     RBC 3.93 (L) 4.00 - 5.20 x10*6/uL     Hemoglobin 10.9 (L) 12.0 - 16.0 g/dL     Hematocrit 36.4 36.0 - 46.0 %     MCV 93 80 - 100 fL     MCH 27.7 26.0 - 34.0 pg     MCHC 29.9 (L) 32.0 - 36.0 g/dL     RDW 19.6 (H) 11.5 - 14.5 %     Platelets 232 150 - 450 x10*3/uL     Neutrophils % 83.7 40.0 - 80.0 %     Immature Granulocytes %, Automated 0.5 0.0 - 0.9 %     Lymphocytes % 7.8 13.0 - 44.0 %     Monocytes % 7.4 2.0 - 10.0 %     Eosinophils % 0.3 0.0 - 6.0 %     Basophils % 0.3 0.0 - 2.0 %     Neutrophils Absolute 10.04 (H) 1.60 - 5.50 x10*3/uL     Immature Granulocytes Absolute, Automated 0.06 0.00 - 0.50 x10*3/uL     Lymphocytes Absolute 0.94 0.80 - 3.00 x10*3/uL     Monocytes Absolute 0.89 (H) 0.05 - 0.80 x10*3/uL     Eosinophils Absolute 0.04 0.00 - 0.40 x10*3/uL     Basophils Absolute 0.04 0.00 - 0.10 x10*3/uL   Comprehensive Metabolic Panel   Result Value Ref Range     Glucose 166 (H) 74 - 99 mg/dL     Sodium 135 (L) 136 - 145 mmol/L     Potassium 4.1 3.5 - 5.3 mmol/L     Chloride 100 98 - 107 mmol/L     Bicarbonate 27 21 - 32 mmol/L     Anion Gap 12 10 - 20 mmol/L     Urea Nitrogen 29 (H) 6 - 23 mg/dL     Creatinine 0.80 0.50 - 1.05 mg/dL     eGFR 78 >60 mL/min/1.73m*2     Calcium 9.1 8.6 - 10.3 mg/dL     Albumin  3.4 3.4 - 5.0 g/dL     Alkaline Phosphatase 102 33 - 136 U/L     Total Protein 8.4 (H) 6.4 - 8.2 g/dL     AST 27 9 - 39 U/L     Bilirubin, Total 0.7 0.0 - 1.2 mg/dL     ALT 18 7 - 45 U/L   Lactate   Result Value Ref Range     Lactate 1.9 0.4 - 2.0 mmol/L   B-Type Natriuretic Peptide   Result Value Ref Range      (H) 0 - 99 pg/mL   Troponin I, High Sensitivity   Result Value Ref Range     Troponin I, High Sensitivity 13 0 - 13 ng/L   Urinalysis with Reflex Culture and Microscopic   Result Value Ref Range     Color, Urine Yellow Straw, Yellow     Appearance, Urine Hazy (N) Clear     Specific Gravity, Urine 1.017 1.005 - 1.035     pH, Urine 8.0 5.0, 5.5, 6.0, 6.5, 7.0, 7.5, 8.0     Protein, Urine >=500 (3+) (N) NEGATIVE mg/dL     Glucose, Urine NEGATIVE NEGATIVE mg/dL     Blood, Urine MODERATE (2+) (A) NEGATIVE     Ketones, Urine NEGATIVE NEGATIVE mg/dL     Bilirubin, Urine NEGATIVE NEGATIVE     Urobilinogen, Urine <2.0 <2.0 mg/dL     Nitrite, Urine NEGATIVE NEGATIVE     Leukocyte Esterase, Urine SMALL (1+) (A) NEGATIVE   Microscopic Only, Urine   Result Value Ref Range     WBC, Urine 21-50 (A) 1-5, NONE /HPF     RBC, Urine >20 (A) NONE, 1-2, 3-5 /HPF     Bacteria, Urine 1+ (A) NONE SEEN /HPF      XR chest 1 view     Result Date: 2/24/2024  STUDY: Chest Radiograph;  02/23/2024 11:02 PM INDICATION: Sepsis, evaluate for source.  COMPARISON: XR chest 12/04/2022.  CT chest/abdomen/pelvis 06/01/2023.  ACCESSION NUMBER(S): [id number] ORDERING CLINICIAN: KENNA ALCALA TECHNIQUE:  Frontal chest was obtained at 2302 hours. FINDINGS: CARDIOMEDIASTINAL SILHOUETTE: Cardiomediastinal silhouette is enlarged in size and configuration. Calcified plaque is seen in the aorta.  LUNGS: Increased interstitial markings are seen bilaterally with subtle patchy right basilar airspace opacity.  ABDOMEN: No remarkable upper abdominal findings.  BONES: No acute osseous changes.  Generalized osseous demineralization is noted.      Cardiomegaly with increased interstitial markings and subtle patchy right infrahilar airspace opacity.  Mild pulmonary edema secondary to CHF is favored although right lower lobe pneumonia is a possibility in the appropriate clinical setting. Signed by Salvatore Parada            Assessment/Plan   Principal Problem:    UTI (urinary tract infection)      73-year-old female with past medical history of Parkinson's, CKD, hypothyroidism, atrial fibrillation on Xarelto, diabetes, hypertension, hyperlipidemia, depression, MI, sacral osteomyelitis, and cholelithiasis, who presented to [facility] ED today via EMS from home for lethargy. EMS stated she had a fever en route. Patient has a albarran catheter that was recently changed on Wednesday.  Continue antibiotics. Patient to be hospitalized for further medical management.      #Suspect sepsis likely 2/2 to pneumonia vs UTI  #Suspect pneumonia RLL  #Suspected UTI likely 2/2 chronic indwelling albarran catheter  #Elevated BNP  #Acute metabolic encephalopathy likely 2/2 suspected UTI/ pneumonia   #Leukocytosis  Admit to OBS/telemetry to Dr. Fitzgerald  Defer consults to attending per request  See imaging results above  Echocardiogram ordered  Continue Azithromycin, Zosyn and vancomycin. De-escalate as appropriate  Strep pneumonia urine and legionella ordered  Sputum culture ordered  Titrate oxygen as needed  Blood and Urine cultures pending  Change albarran catheter  Daily weight  PT/OT  Social work  Repeat labs in AM     #Pressure ulcer of sacral region, stage 4  #Pressure ulcer left hip, stage 1  Continue wound care regimen  Air mattress  Turn q 2 hours     #Acute on chronic anemia  H&H 10.9/36.4  Continue to monitor     Chronic issues  #Parkinson's disease  #CKD  #Hypothyroidism  #Atrial fibrillation  #DMII  #HTN  #HLD  #Depression  #MI  #Cholelithiasis  Continue home medications as appropriate when nursing completes home med rec  SSI with hypoglycemic protocol     #DVT  prophylaxis  Continue home Xarelto  SCDs     I spent 45 minutes in the professional and overall care of this patient.           Addendum patient seen and evaluated agree with all the above to treat her for a urinary tract infection change in mental status history of Parkinson's repeat the CBC and the electrolytes continue with all above management thank you                    Revision History         This patient has a urinary catheter   Reason for the urinary catheter remaining today?           Principal Problem:    UTI (urinary tract infection)  Active Problems:    Urinary tract infection associated with indwelling urethral catheter, initial encounter (CMS/[facility])          Malnutrition     Patient fully evaluated on February 29.  IV antibiotics have been discontinued will monitor overnight.  Recheck labs in AM.  Remeron added for appetite stimulant     I agree with the dietitian's malnutrition diagnosis.     Patient fully evaluated on February 28.  Urine culture results are noted.  Continue present IV antibiotics for additional 24 hours and recheck labs in a.m.  Patient fully evaluated on March 1.  Still with  and significant oral intake.  Family agreeable to PEG placement.  Continue present IV antibiotics for now    Patient fully evaluated on March 2.  Slight improvement in oral intake.  Patient agreeable to PEG placement now.  More awake and alert after narcotics were de-escalated.  Balaji Fitzgerald MD

## 2024-03-03 NOTE — CARE PLAN
The patient's goals for the shift include maintain comfort   The clinical goals for the shift include treat wounds and encourage and assist with PO intake

## 2024-03-03 NOTE — CARE PLAN
The patient's goals for the shift include      The clinical goals for the shift include Consume each meal > 50%      Problem: Skin  Goal: Decreased wound size/increased tissue granulation at next dressing change  Outcome: Progressing  Goal: Participates in plan/prevention/treatment measures  Outcome: Progressing  Goal: Prevent/manage excess moisture  Outcome: Progressing  Goal: Prevent/minimize sheer/friction injuries  Outcome: Progressing  Goal: Promote/optimize nutrition  Outcome: Progressing  Goal: Promote skin healing  Outcome: Progressing

## 2024-03-03 NOTE — PROGRESS NOTES
Irina Stratton is a 73 y.o. female on day 8 of admission presenting with UTI (urinary tract infection).      Subjective   Patient fully evaluated on February 28 and clinically improved.       Objective     Last Recorded Vitals  /75   Pulse 87   Temp 36.8 °C (98.2 °F)   Resp 18   Wt 53.5 kg (117 lb 15.1 oz)   SpO2 93%   Intake/Output last 3 Shifts:    Intake/Output Summary (Last 24 hours) at 3/3/2024 1626  Last data filed at 3/3/2024 1500  Gross per 24 hour   Intake 990 ml   Output 1450 ml   Net -460 ml         Admission Weight  Weight: 52 kg (114 lb 10.2 oz) (02/24/24 0300)    Daily Weight  03/03/24 : 53.5 kg (117 lb 15.1 oz)    Image Results  Transthoracic Echo (TTE) Complete      [facility], [address] [place] [address] [id number] [phone] and                                  Fax [phone]    TRANSTHORACIC ECHOCARDIOGRAM REPORT       Patient Name:      IRINA STRATTON     Reading Physician:    [id number] Garrett Daley MD  Study Date:        2/25/2024            Ordering Provider:    [id number] PASTOR HEARD  MRN/PID:           [id number]             Fellow:  Accession#:        [id number]         Nurse:  Date of Birth/Age: 1950 / 73 years Sonographer:          Sonam Martinez RDCS  Gender:            F                    Additional Staff:  Height:            167.00 cm            Admit Date:           2/24/2024  Weight:            56.00 kg             Admission Status:     Inpatient -                                                                Priority discharge  BSA / BMI:         1.62 m2 / 20.08      Encounter#:           [id number]                     kg/m2                                          Department Location:  [facility]  Blood Pressure: 127 /59 mmHg    Study Type:    TRANSTHORACIC ECHO (TTE) COMPLETE  Diagnosis/ICD: Elevated  Troponin-R79.89  Indication:    Elevated Troponin  CPT Code:      Echo Complete w Full Doppler-25069    Patient History:  Pertinent History: A-Fib, AAA and HTN.    Study Detail: The following Echo studies were performed: 2D, M-Mode, Doppler and                color flow. Technically challenging study due to prominent lung                artifact.       PHYSICIAN INTERPRETATION:  Left Ventricle: The left ventricular systolic function is low normal, with an estimated ejection fraction of 50%. There are no regional wall motion abnormalities. The left ventricular cavity size is normal. Spectral Doppler shows an impaired relaxation pattern of left ventricular diastolic filling.  Left Atrium: The left atrium is upper limits of normal in size.  Right Ventricle: The right ventricle is normal in size. There is normal right ventricular global systolic function.  Right Atrium: The right atrium is normal in size.  Aortic Valve: The aortic valve is trileaflet. There is evidence of mild aortic valve stenosis.  There is no evidence of aortic valve regurgitation. The peak instantaneous gradient of the aortic valve is 11.3 mmHg. The mean gradient of the aortic valve is 6.0 mmHg.  Mitral Valve: The mitral valve is normal in structure. There is mild mitral valve regurgitation.  Tricuspid Valve: The tricuspid valve is structurally normal. No evidence of tricuspid regurgitation.  Pulmonic Valve: The pulmonic valve is structurally normal. There is no indication of pulmonic valve regurgitation.  Pericardium: There is no pericardial effusion noted.  Aorta: The aortic root is normal.       CONCLUSIONS:   1. Left ventricular systolic function is low normal with a 50% estimated ejection fraction.   2. Spectral Doppler shows an impaired relaxation pattern of left ventricular diastolic filling.   3. Mild aortic valve stenosis.    QUANTITATIVE DATA SUMMARY:  2D MEASUREMENTS:                            Normal Ranges:  IVSd:          1.05 cm     (0.6-1.1cm)  LVPWd:         1.02 cm    (0.6-1.1cm)  LVIDd:         4.78 cm    (3.9-5.9cm)  LVIDs:         3.27 cm  LV Mass Index: 109.0 g/m2  LV % FS        31.6 %    AORTA MEASUREMENTS:                     Normal Ranges:  Asc Ao, d: 3.20 cm (2.1-3.4cm)    LV SYSTOLIC FUNCTION BY 2D PLANIMETRY (MOD):                      Normal Ranges:  EF-A4C View: 52.0 % (>=55%)  EF-A2C View: 45.9 %  EF-Biplane:  49.7 %    LV DIASTOLIC FUNCTION:                         Normal Ranges:  MV Peak E:    0.93 m/s (0.7-1.2 m/s)  MV Peak A:    0.92 m/s (0.42-0.7 m/s)  E/A Ratio:    1.01     (1.0-2.2)  MV lateral e' 0.09 m/s  MV medial e'  0.08 m/s    MITRAL VALVE:                  Normal Ranges:  MV DT: 218 msec (150-240msec)    AORTIC VALVE:                                     Normal Ranges:  AoV Vmax:                1.68 m/s  (<=1.7m/s)  AoV Peak P.3 mmHg (<20mmHg)  AoV Mean P.0 mmHg  (1.7-11.5mmHg)  LVOT Max Oscar:            0.63 m/s  (<=1.1m/s)  AoV VTI:                 41.50 cm  (18-25cm)  LVOT VTI:                15.70 cm  LVOT Diameter:           2.20 cm   (1.8-2.4cm)  AoV Area, VTI:           1.44 cm2  (2.5-5.5cm2)  AoV Area,Vmax:           1.42 cm2  (2.5-4.5cm2)  AoV Dimensionless Index: 0.38       RIGHT VENTRICLE:  RV Basal 2.39 cm  RV Mid   1.60 cm  RV Major 7.0 cm  TAPSE:   20.5 mm  RV s'    0.16 m/s    TRICUSPID VALVE/RVSP:                              Normal Ranges:  Peak TR Velocity: 2.36 m/s  RV Syst Pressure: 25.3 mmHg (< 30mmHg)    PULMONIC VALVE:                       Normal Ranges:  PV Max Oscar: 0.8 m/s  (0.6-0.9m/s)  PV Max P.3 mmHg  PV Mean P.0 mmHg  PV VTI:     18.50 cm       [id number] Garrett Daley MD  Electronically signed on 2024 at 1:51:45 PM       ** Final **      Physical Exam    Relevant Results               Assessment/Plan         H&P     Addendum     Date of Service: 2024  5:46 AM     Addendum       Expand All Collapse All    History Of Present Illness  Irina JARRELL  Viral is a 73 y.o. female with past medical history of Parkinson's, CKD, hypothyroidism, atrial fibrillation on Xarelto, diabetes, hypertension, hyperlipidemia, depression, MI, sacral osteomyelitis, and cholelithiasis, who presented to [facility] ED today via EMS from home for lethargy. EMS stated she had a fever en route. Patient has a albarran catheter that was recently changed on Wednesday.  stated patient has been more lethargic than normal. He states she does not communicate much and is non-ambulatory at baseline. Additional history obtained from chart.     ED course: Temp 37.5C, /71, HR 96, RR 20, 95% RA. EKG unavailable for my review. Labs: WBC 12.0, neutrophils 10.04. RBC 3.98, H&H 10.9/36.4.[id number]. Sodium 135. BUN 29. . Troponin 13. UA: hazy, 3+protein, moderate blood, small leukocytes, 1+bacteria, RBC>20, WBC 21-50. Urine and blood cultures collected. See imaging results below. Azithromycin, zosyn, vancomycin started in ED. Patient will be admitted under the care of Dr. Fitzgerald who will continue to follow. I was asked to H&P and place initial admission orders.  Past Medical History  Medical History        Past Medical History:   Diagnosis Date    Abnormal radiologic findings on diagnostic imaging of right kidney       Abnormal ultrasound of both kidneys    Personal history of other medical treatment       History of echocardiogram            Surgical History  Surgical History         Past Surgical History:   Procedure Laterality Date     SECTION, CLASSIC   2016      Section    CT ABDOMEN PELVIS ANGIOGRAM W AND/OR WO IV CONTRAST   2019     CT ABDOMEN PELVIS ANGIOGRAM W AND/OR WO IV CONTRAST 2019 [facility]    CT AORTA AND BILATERAL ILIOFEMORAL RUNOFF ANGIOGRAM W AND/OR WO IV CONTRAST   2020     CT AORTA AND BILATERAL ILIOFEMORAL RUNOFF ANGIOGRAM W AND/OR WO IV CONTRAST 2020 [facility]GAC[facility]    OTHER SURGICAL HISTORY   2020      History of prior surgery    OTHER SURGICAL HISTORY   01/31/2020     Coronary artery stent placement    OTHER SURGICAL HISTORY   06/11/2020     Abdominal aortic aneurysm repair endovascular    OTHER SURGICAL HISTORY   01/31/2020     Cardiac catheterization    OTHER SURGICAL HISTORY   01/17/2019     Renal angioplasty and stenting            Social History  She reports that she quit smoking about 13 months ago. Her smoking use included cigarettes. She has never used smokeless tobacco. She reports that she does not currently use alcohol. She reports that she does not use drugs. Lives with . Bedbound.     Family History  Family History          Family History   Problem Relation Name Age of Onset    Stroke Mother                Allergies  Patient has no known allergies.     Review of Systems   Limited ROS due to patient mentation.  Physical Exam  Constitutional:       Appearance: She is ill-appearing.      Comments: Cachetic appearance   HENT:      Head: Normocephalic and atraumatic.      Nose: Nose normal.      Mouth/Throat:      Mouth: Mucous membranes are moist.      Pharynx: Oropharynx is clear.      Comments: Poor dentition  Eyes:      Extraocular Movements: Extraocular movements intact.      Conjunctiva/sclera: Conjunctivae normal.      Pupils: Pupils are equal, round, and reactive to light.   Cardiovascular:      Rate and Rhythm: Normal rate and regular rhythm.      Pulses: Normal pulses.      Heart sounds: Normal heart sounds.   Pulmonary:      Effort: Pulmonary effort is normal.      Breath sounds: Normal breath sounds.   Abdominal:      General: Abdomen is flat. Bowel sounds are normal.      Palpations: Abdomen is soft.   Genitourinary:     Comments: Rain in place  Musculoskeletal:         General: Normal range of motion.      Cervical back: Normal range of motion and neck supple.   Skin:     General: Skin is warm and dry.      Capillary Refill: Capillary refill takes 2 to 3 seconds.      Comments:  Dressing to sacrum   Neurological:      General: No focal deficit present.      Mental Status: She is alert. She is disoriented.      Comments: Answered some questions and followed some commands. Confusion noted.   Psychiatric:         Mood and Affect: Mood normal.            Last Recorded Vitals  Blood pressure 140/70, pulse 98, temperature 37.5 °C (99.5 °F), temperature source Temporal, resp. rate 18, weight 52 kg (114 lb 10.2 oz), SpO2 94 %.     Relevant Results        Results for orders placed or performed during the hospital encounter of 02/23/24 (from the past 24 hour(s))   CBC and Auto Differential   Result Value Ref Range     WBC 12.0 (H) 4.4 - 11.3 x10*3/uL     nRBC 0.0 0.0 - 0.0 /100 WBCs     RBC 3.93 (L) 4.00 - 5.20 x10*6/uL     Hemoglobin 10.9 (L) 12.0 - 16.0 g/dL     Hematocrit 36.4 36.0 - 46.0 %     MCV 93 80 - 100 fL     MCH 27.7 26.0 - 34.0 pg     MCHC 29.9 (L) 32.0 - 36.0 g/dL     RDW 19.6 (H) 11.5 - 14.5 %     Platelets 232 150 - 450 x10*3/uL     Neutrophils % 83.7 40.0 - 80.0 %     Immature Granulocytes %, Automated 0.5 0.0 - 0.9 %     Lymphocytes % 7.8 13.0 - 44.0 %     Monocytes % 7.4 2.0 - 10.0 %     Eosinophils % 0.3 0.0 - 6.0 %     Basophils % 0.3 0.0 - 2.0 %     Neutrophils Absolute 10.04 (H) 1.60 - 5.50 x10*3/uL     Immature Granulocytes Absolute, Automated 0.06 0.00 - 0.50 x10*3/uL     Lymphocytes Absolute 0.94 0.80 - 3.00 x10*3/uL     Monocytes Absolute 0.89 (H) 0.05 - 0.80 x10*3/uL     Eosinophils Absolute 0.04 0.00 - 0.40 x10*3/uL     Basophils Absolute 0.04 0.00 - 0.10 x10*3/uL   Comprehensive Metabolic Panel   Result Value Ref Range     Glucose 166 (H) 74 - 99 mg/dL     Sodium 135 (L) 136 - 145 mmol/L     Potassium 4.1 3.5 - 5.3 mmol/L     Chloride 100 98 - 107 mmol/L     Bicarbonate 27 21 - 32 mmol/L     Anion Gap 12 10 - 20 mmol/L     Urea Nitrogen 29 (H) 6 - 23 mg/dL     Creatinine 0.80 0.50 - 1.05 mg/dL     eGFR 78 >60 mL/min/1.73m*2     Calcium 9.1 8.6 - 10.3 mg/dL     Albumin  3.4 3.4 - 5.0 g/dL     Alkaline Phosphatase 102 33 - 136 U/L     Total Protein 8.4 (H) 6.4 - 8.2 g/dL     AST 27 9 - 39 U/L     Bilirubin, Total 0.7 0.0 - 1.2 mg/dL     ALT 18 7 - 45 U/L   Lactate   Result Value Ref Range     Lactate 1.9 0.4 - 2.0 mmol/L   B-Type Natriuretic Peptide   Result Value Ref Range      (H) 0 - 99 pg/mL   Troponin I, High Sensitivity   Result Value Ref Range     Troponin I, High Sensitivity 13 0 - 13 ng/L   Urinalysis with Reflex Culture and Microscopic   Result Value Ref Range     Color, Urine Yellow Straw, Yellow     Appearance, Urine Hazy (N) Clear     Specific Gravity, Urine 1.017 1.005 - 1.035     pH, Urine 8.0 5.0, 5.5, 6.0, 6.5, 7.0, 7.5, 8.0     Protein, Urine >=500 (3+) (N) NEGATIVE mg/dL     Glucose, Urine NEGATIVE NEGATIVE mg/dL     Blood, Urine MODERATE (2+) (A) NEGATIVE     Ketones, Urine NEGATIVE NEGATIVE mg/dL     Bilirubin, Urine NEGATIVE NEGATIVE     Urobilinogen, Urine <2.0 <2.0 mg/dL     Nitrite, Urine NEGATIVE NEGATIVE     Leukocyte Esterase, Urine SMALL (1+) (A) NEGATIVE   Microscopic Only, Urine   Result Value Ref Range     WBC, Urine 21-50 (A) 1-5, NONE /HPF     RBC, Urine >20 (A) NONE, 1-2, 3-5 /HPF     Bacteria, Urine 1+ (A) NONE SEEN /HPF      XR chest 1 view     Result Date: 2/24/2024  STUDY: Chest Radiograph;  02/23/2024 11:02 PM INDICATION: Sepsis, evaluate for source.  COMPARISON: XR chest 12/04/2022.  CT chest/abdomen/pelvis 06/01/2023.  ACCESSION NUMBER(S): [id number] ORDERING CLINICIAN: KENNA ALCALA TECHNIQUE:  Frontal chest was obtained at 2302 hours. FINDINGS: CARDIOMEDIASTINAL SILHOUETTE: Cardiomediastinal silhouette is enlarged in size and configuration. Calcified plaque is seen in the aorta.  LUNGS: Increased interstitial markings are seen bilaterally with subtle patchy right basilar airspace opacity.  ABDOMEN: No remarkable upper abdominal findings.  BONES: No acute osseous changes.  Generalized osseous demineralization is noted.      Cardiomegaly with increased interstitial markings and subtle patchy right infrahilar airspace opacity.  Mild pulmonary edema secondary to CHF is favored although right lower lobe pneumonia is a possibility in the appropriate clinical setting. Signed by Salvatore Parada            Assessment/Plan   Principal Problem:    UTI (urinary tract infection)      73-year-old female with past medical history of Parkinson's, CKD, hypothyroidism, atrial fibrillation on Xarelto, diabetes, hypertension, hyperlipidemia, depression, MI, sacral osteomyelitis, and cholelithiasis, [facility] for [facility]argy. EMS stated she had a fever en route. Patient has a albarran catheter that was recently changed on Wednesday.  Continue antibiotics. Patient to be hospitalized for further medical management.      #Suspect sepsis likely 2/2 to pneumonia vs UTI  #Suspect pneumonia RLL  #Suspected UTI likely 2/2 chronic indwelling albarran catheter  #Elevated BNP  #Acute metabolic encephalopathy likely 2/2 suspected UTI/ pneumonia   #Leukocytosis  Admit to OBS/telemetry to Dr. Fitzgerald  Defer consults to attending per request  See imaging results above  Echocardiogram ordered  Continue Azithromycin, Zosyn and vancomycin. De-escalate as appropriate  Strep pneumonia urine and legionella ordered  Sputum culture ordered  Titrate oxygen as needed  Blood and Urine cultures pending  Change albarran catheter  Daily weight  PT/OT  Social work  Repeat labs in AM     #Pressure ulcer of sacral region, stage 4  #Pressure ulcer left hip, stage 1  Continue wound care regimen  Air mattress  Turn q 2 hours     #Acute on chronic anemia  H&H 10.9/36.4  Continue to monitor     Chronic issues  #Parkinson's disease  #CKD  #Hypothyroidism  #Atrial fibrillation  #DMII  #HTN  #HLD  #Depression  #MI  #Cholelithiasis  Continue home medications as appropriate when nursing completes home med rec  SSI with hypoglycemic protocol     #DVT  prophylaxis  Continue home Xarelto  SCDs     I spent 45 minutes in the professional and overall care of this patient.           Addendum patient seen and evaluated agree with all the above to treat her for a urinary tract infection change in mental status history of Parkinson's repeat the CBC and the electrolytes continue with all above management thank you                    Revision History         This patient has a urinary catheter   Reason for the urinary catheter remaining today?           Principal Problem:    UTI (urinary tract infection)  Active Problems:    Urinary tract infection associated with indwelling urethral catheter, initial encounter (CMS/[facility])          Malnutrition     Patient fully evaluated on February 29.  IV antibiotics have been discontinued will monitor overnight.  Recheck labs in AM.  Remeron added for appetite stimulant     I agree with the dietitian's malnutrition diagnosis.     Patient fully evaluated on February 28.  Urine culture results are noted.  Continue present IV antibiotics for additional 24 hours and recheck labs in a.m.  Patient fully evaluated on March 1.  Still with  and significant oral intake.  Family agreeable to PEG placement.  Continue present IV antibiotics for now    Patient fully evaluated on March 2.  Slight improvement in oral intake.  Patient agreeable to PEG placement now.  More awake and alert after narcotics were de-escalated.    Patient fully evaluated on March 3 and appetite slightly improved.  Continue puréed diet.  Await PEG placement.  Balaji Fitzgerald MD

## 2024-03-04 NOTE — PROGRESS NOTES
"Irina Stratton is a 73 y.o. female on day 9 of admission presenting with UTI (urinary tract infection).    Subjective   Patient appears to be drowsy but was able to talk.  She denies any abdominal pain has been taking some liquids       Objective     Physical Exam thin emaciated lady.  No tachycardia the jugular distention is not present lungs are okay abdomen is very scaphoid I am unable to feel any masses as such.  Bowel sounds are present    Last Recorded Vitals  Blood pressure 131/76, pulse 84, temperature 36 °C (96.8 °F), resp. rate 18, height 1.676 m (5' 5.98\"), weight 53.5 kg (117 lb 15.1 oz), SpO2 93 %.  Intake/Output last 3 Shifts:  I/O last 3 completed shifts:  In: 890 (16.6 mL/kg) [P.O.:890]  Out: 2000 (37.4 mL/kg) [Urine:2000 (1 mL/kg/hr)]  Weight: 53.5 kg     Relevant Results  No current facility-administered medications on file prior to encounter.     Current Outpatient Medications on File Prior to Encounter   Medication Sig Dispense Refill    triamcinolone (Kenalog) 0.1 % cream Apply 1 Application topically if needed for rash.      calcitriol (Rocaltrol) 0.25 mcg capsule Take 1 capsule (0.25 mcg) by mouth. Monday, Wednesday, and Friday      carbidopa-levodopa (Sinemet)  mg tablet Take 1/2 tab TID with small meals x 7 days then take 1 tab TID and continue 270 tablet 3    cholecalciferol (Vitamin D-3) 25 MCG (1000 UT) capsule Take 3 capsules (75 mcg) by mouth once daily.      DULoxetine (Cymbalta) 30 mg DR capsule Take 1 capsule (30 mg) by mouth once daily.      glimepiride (Amaryl) 1 mg tablet Take 0.5 tablets (0.5 mg) by mouth once daily in the morning.      levothyroxine (Synthroid, Levoxyl) 88 mcg tablet Take 1 tablet (88 mcg) by mouth once daily in the morning. Take before meals.      metoprolol tartrate (Lopressor) 25 mg tablet Take 1 tablet (25 mg) by mouth 2 times a day.      oxyCODONE-acetaminophen (Percocet) 5-325 mg tablet Take 1 tablet by mouth 3 times a day.      rivaroxaban " (Xarelto) 20 mg tablet Take 1 tablet (20 mg) by mouth once daily.      rosuvastatin (Crestor) 40 mg tablet Take 1 tablet (40 mg) by mouth once daily. 30 tablet 2      Results for orders placed or performed during the hospital encounter of 02/23/24 (from the past 96 hour(s))   POCT GLUCOSE   Result Value Ref Range    POCT Glucose 126 (H) 74 - 99 mg/dL   POCT GLUCOSE   Result Value Ref Range    POCT Glucose 159 (H) 74 - 99 mg/dL   CBC   Result Value Ref Range    WBC 8.4 4.4 - 11.3 x10*3/uL    nRBC 0.0 0.0 - 0.0 /100 WBCs    RBC 3.80 (L) 4.00 - 5.20 x10*6/uL    Hemoglobin 10.7 (L) 12.0 - 16.0 g/dL    Hematocrit 35.3 (L) 36.0 - 46.0 %    MCV 93 80 - 100 fL    MCH 28.2 26.0 - 34.0 pg    MCHC 30.3 (L) 32.0 - 36.0 g/dL    RDW 18.8 (H) 11.5 - 14.5 %    Platelets 325 150 - 450 x10*3/uL   Comprehensive Metabolic Panel   Result Value Ref Range    Glucose 120 (H) 74 - 99 mg/dL    Sodium 138 136 - 145 mmol/L    Potassium 3.9 3.5 - 5.3 mmol/L    Chloride 105 98 - 107 mmol/L    Bicarbonate 23 21 - 32 mmol/L    Anion Gap 14 10 - 20 mmol/L    Urea Nitrogen 37 (H) 6 - 23 mg/dL    Creatinine 0.92 0.50 - 1.05 mg/dL    eGFR 66 >60 mL/min/1.73m*2    Calcium 8.8 8.6 - 10.3 mg/dL    Albumin 3.2 (L) 3.4 - 5.0 g/dL    Alkaline Phosphatase 73 33 - 136 U/L    Total Protein 7.2 6.4 - 8.2 g/dL    AST 22 9 - 39 U/L    Bilirubin, Total 0.4 0.0 - 1.2 mg/dL    ALT 9 7 - 45 U/L   POCT GLUCOSE   Result Value Ref Range    POCT Glucose 110 (H) 74 - 99 mg/dL   POCT GLUCOSE   Result Value Ref Range    POCT Glucose 123 (H) 74 - 99 mg/dL   POCT GLUCOSE   Result Value Ref Range    POCT Glucose 98 74 - 99 mg/dL   POCT GLUCOSE   Result Value Ref Range    POCT Glucose 166 (H) 74 - 99 mg/dL   Comprehensive Metabolic Panel   Result Value Ref Range    Glucose 96 74 - 99 mg/dL    Sodium 140 136 - 145 mmol/L    Potassium 3.9 3.5 - 5.3 mmol/L    Chloride 107 98 - 107 mmol/L    Bicarbonate 23 21 - 32 mmol/L    Anion Gap 14 10 - 20 mmol/L    Urea Nitrogen 44 (H) 6 - 23  mg/dL    Creatinine 0.92 0.50 - 1.05 mg/dL    eGFR 66 >60 mL/min/1.73m*2    Calcium 9.3 8.6 - 10.3 mg/dL    Albumin 3.1 (L) 3.4 - 5.0 g/dL    Alkaline Phosphatase 75 33 - 136 U/L    Total Protein 7.3 6.4 - 8.2 g/dL    AST 21 9 - 39 U/L    Bilirubin, Total 0.5 0.0 - 1.2 mg/dL    ALT 9 7 - 45 U/L   CBC   Result Value Ref Range    WBC 8.1 4.4 - 11.3 x10*3/uL    nRBC 0.0 0.0 - 0.0 /100 WBCs    RBC 3.85 (L) 4.00 - 5.20 x10*6/uL    Hemoglobin 10.7 (L) 12.0 - 16.0 g/dL    Hematocrit 36.1 36.0 - 46.0 %    MCV 94 80 - 100 fL    MCH 27.8 26.0 - 34.0 pg    MCHC 29.6 (L) 32.0 - 36.0 g/dL    RDW 18.9 (H) 11.5 - 14.5 %    Platelets 337 150 - 450 x10*3/uL   POCT GLUCOSE   Result Value Ref Range    POCT Glucose 102 (H) 74 - 99 mg/dL   POCT GLUCOSE   Result Value Ref Range    POCT Glucose 134 (H) 74 - 99 mg/dL   POCT GLUCOSE   Result Value Ref Range    POCT Glucose 159 (H) 74 - 99 mg/dL   POCT GLUCOSE   Result Value Ref Range    POCT Glucose 149 (H) 74 - 99 mg/dL   CBC   Result Value Ref Range    WBC 7.7 4.4 - 11.3 x10*3/uL    nRBC 0.0 0.0 - 0.0 /100 WBCs    RBC 3.79 (L) 4.00 - 5.20 x10*6/uL    Hemoglobin 10.3 (L) 12.0 - 16.0 g/dL    Hematocrit 34.9 (L) 36.0 - 46.0 %    MCV 92 80 - 100 fL    MCH 27.2 26.0 - 34.0 pg    MCHC 29.5 (L) 32.0 - 36.0 g/dL    RDW 18.8 (H) 11.5 - 14.5 %    Platelets 357 150 - 450 x10*3/uL   Comprehensive Metabolic Panel   Result Value Ref Range    Glucose 126 (H) 74 - 99 mg/dL    Sodium 141 136 - 145 mmol/L    Potassium 4.3 3.5 - 5.3 mmol/L    Chloride 107 98 - 107 mmol/L    Bicarbonate 23 21 - 32 mmol/L    Anion Gap 15 10 - 20 mmol/L    Urea Nitrogen 48 (H) 6 - 23 mg/dL    Creatinine 1.09 (H) 0.50 - 1.05 mg/dL    eGFR 54 (L) >60 mL/min/1.73m*2    Calcium 9.2 8.6 - 10.3 mg/dL    Albumin 3.2 (L) 3.4 - 5.0 g/dL    Alkaline Phosphatase 90 33 - 136 U/L    Total Protein 7.5 6.4 - 8.2 g/dL    AST 23 9 - 39 U/L    Bilirubin, Total 0.5 0.0 - 1.2 mg/dL    ALT 11 7 - 45 U/L   POCT GLUCOSE   Result Value Ref Range     POCT Glucose 134 (H) 74 - 99 mg/dL   POCT GLUCOSE   Result Value Ref Range    POCT Glucose 145 (H) 74 - 99 mg/dL   POCT GLUCOSE   Result Value Ref Range    POCT Glucose 186 (H) 74 - 99 mg/dL   POCT GLUCOSE   Result Value Ref Range    POCT Glucose 114 (H) 74 - 99 mg/dL   CBC   Result Value Ref Range    WBC 8.2 4.4 - 11.3 x10*3/uL    nRBC 0.0 0.0 - 0.0 /100 WBCs    RBC 3.85 (L) 4.00 - 5.20 x10*6/uL    Hemoglobin 10.7 (L) 12.0 - 16.0 g/dL    Hematocrit 34.9 (L) 36.0 - 46.0 %    MCV 91 80 - 100 fL    MCH 27.8 26.0 - 34.0 pg    MCHC 30.7 (L) 32.0 - 36.0 g/dL    RDW 18.6 (H) 11.5 - 14.5 %    Platelets 332 150 - 450 x10*3/uL   Comprehensive Metabolic Panel   Result Value Ref Range    Glucose 139 (H) 74 - 99 mg/dL    Sodium 138 136 - 145 mmol/L    Potassium 4.0 3.5 - 5.3 mmol/L    Chloride 105 98 - 107 mmol/L    Bicarbonate 23 21 - 32 mmol/L    Anion Gap 14 10 - 20 mmol/L    Urea Nitrogen 42 (H) 6 - 23 mg/dL    Creatinine 1.03 0.50 - 1.05 mg/dL    eGFR 58 (L) >60 mL/min/1.73m*2    Calcium 9.1 8.6 - 10.3 mg/dL    Albumin 3.2 (L) 3.4 - 5.0 g/dL    Alkaline Phosphatase 78 33 - 136 U/L    Total Protein 7.5 6.4 - 8.2 g/dL    AST 21 9 - 39 U/L    Bilirubin, Total 0.6 0.0 - 1.2 mg/dL    ALT 7 7 - 45 U/L   POCT GLUCOSE   Result Value Ref Range    POCT Glucose 149 (H) 74 - 99 mg/dL   POCT GLUCOSE   Result Value Ref Range    POCT Glucose 104 (H) 74 - 99 mg/dL      Transthoracic Echo (TTE) Complete    Result Date: 2/25/2024    [facility], [address] [facility] [id number] [phone] and                                 Fax [phone] TRANSTHORACIC ECHOCARDIOGRAM REPORT  Patient Name:      ANTHONY Wong Physician:    [id number] Garrett Daley MD Study Date:        2/25/2024            Ordering Provider:    [id number] PASTOR HEARD MRN/PID:           [id number]             Fellow: Accession#:        [id number]         Nurse: Date of Birth/Age:  1950 / 73 years Sonographer:          Sonam Martinez                                                               RDCS Gender:            F                    Additional Staff: Height:            167.00 cm            Admit Date:           2/24/2024 Weight:            56.00 kg             Admission Status:     Inpatient -                                                               Priority discharge BSA / BMI:         1.62 m2 / 20.08      Encounter#:           [id number]                    kg/m2                                         Department Location:  [facility] Blood Pressure: 127 /59 mmHg Study Type:    TRANSTHORACIC ECHO (TTE) COMPLETE Diagnosis/ICD: Elevated Troponin-R79.89 Indication:    Elevated Troponin CPT Code:      Echo Complete w Full Doppler-43031 Patient History: Pertinent History: A-Fib, AAA and HTN. Study Detail: The following Echo studies were performed: 2D, M-Mode, Doppler and               color flow. Technically challenging study due to prominent lung               artifact.  PHYSICIAN INTERPRETATION: Left Ventricle: The left ventricular systolic function is low normal, with an estimated ejection fraction of 50%. There are no regional wall motion abnormalities. The left ventricular cavity size is normal. Spectral Doppler shows an impaired relaxation pattern of left ventricular diastolic filling. Left Atrium: The left atrium is upper limits of normal in size. Right Ventricle: The right ventricle is normal in size. There is normal right ventricular global systolic function. Right Atrium: The right atrium is normal in size. Aortic Valve: The aortic valve is trileaflet. There is evidence of mild aortic valve stenosis. There is no evidence of aortic valve regurgitation. The peak instantaneous gradient of the aortic valve is 11.3 mmHg. The mean gradient of the aortic valve is 6.0 mmHg. Mitral Valve: The mitral valve is normal in structure. There is mild mitral valve regurgitation.  Tricuspid Valve: The tricuspid valve is structurally normal. No evidence of tricuspid regurgitation. Pulmonic Valve: The pulmonic valve is structurally normal. There is no indication of pulmonic valve regurgitation. Pericardium: There is no pericardial effusion noted. Aorta: The aortic root is normal.  CONCLUSIONS:  1. Left ventricular systolic function is low normal with a 50% estimated ejection fraction.  2. Spectral Doppler shows an impaired relaxation pattern of left ventricular diastolic filling.  3. Mild aortic valve stenosis. QUANTITATIVE DATA SUMMARY: 2D MEASUREMENTS:                           Normal Ranges: IVSd:          1.05 cm    (0.6-1.1cm) LVPWd:         1.02 cm    (0.6-1.1cm) LVIDd:         4.78 cm    (3.9-5.9cm) LVIDs:         3.27 cm LV Mass Index: 109.0 g/m2 LV % FS        31.6 % AORTA MEASUREMENTS:                    Normal Ranges: Asc Ao, d: 3.20 cm (2.1-3.4cm) LV SYSTOLIC FUNCTION BY 2D PLANIMETRY (MOD):                     Normal Ranges: EF-A4C View: 52.0 % (>=55%) EF-A2C View: 45.9 % EF-Biplane:  49.7 % LV DIASTOLIC FUNCTION:                        Normal Ranges: MV Peak E:    0.93 m/s (0.7-1.2 m/s) MV Peak A:    0.92 m/s (0.42-0.7 m/s) E/A Ratio:    1.01     (1.0-2.2) MV lateral e' 0.09 m/s MV medial e'  0.08 m/s MITRAL VALVE:                 Normal Ranges: MV DT: 218 msec (150-240msec) AORTIC VALVE:                                    Normal Ranges: AoV Vmax:                1.68 m/s  (<=1.7m/s) AoV Peak P.3 mmHg (<20mmHg) AoV Mean P.0 mmHg  (1.7-11.5mmHg) LVOT Max Oscar:            0.63 m/s  (<=1.1m/s) AoV VTI:                 41.50 cm  (18-25cm) LVOT VTI:                15.70 cm LVOT Diameter:           2.20 cm   (1.8-2.4cm) AoV Area, VTI:           1.44 cm2  (2.5-5.5cm2) AoV Area,Vmax:           1.42 cm2  (2.5-4.5cm2) AoV Dimensionless Index: 0.38  RIGHT VENTRICLE: RV Basal 2.39 cm RV Mid   1.60 cm RV Major 7.0 cm TAPSE:   20.5 mm RV s'    0.16 m/s TRICUSPID  VALVE/RVSP:                             Normal Ranges: Peak TR Velocity: 2.36 m/s RV Syst Pressure: 25.3 mmHg (< 30mmHg) PULMONIC VALVE:                      Normal Ranges: PV Max Oscar: 0.8 m/s  (0.6-0.9m/s) PV Max P.3 mmHg PV Mean P.0 mmHg PV VTI:     18.50 cm  [id number] Garrett Daley MD Electronically signed on 2024 at 1:51:45 PM  ** Final **     XR chest 1 view    Result Date: 2024  STUDY: Chest Radiograph;  2024 11:02 PM INDICATION: Sepsis, evaluate for source.  COMPARISON: XR chest 2022.  CT chest/abdomen/pelvis 2023.  ACCESSION NUMBER(S): [id number] ORDERING CLINICIAN: KENNA ALCALA TECHNIQUE:  Frontal chest was obtained at 2302 hours. FINDINGS: CARDIOMEDIASTINAL SILHOUETTE: Cardiomediastinal silhouette is enlarged in size and configuration. Calcified plaque is seen in the aorta.  LUNGS: Increased interstitial markings are seen bilaterally with subtle patchy right basilar airspace opacity.  ABDOMEN: No remarkable upper abdominal findings.  BONES: No acute osseous changes.  Generalized osseous demineralization is noted.    Cardiomegaly with increased interstitial markings and subtle patchy right infrahilar airspace opacity.  Mild pulmonary edema secondary to CHF is favored although right lower lobe pneumonia is a possibility in the appropriate clinical setting. Signed by Salvatore Parada    * Cannot find OR log *  Last relevant procedure:             This patient has a urinary catheter   Reason for the urinary catheter remaining today?                 Assessment/Plan   Principal Problem:    UTI (urinary tract infection)  Active Problems:    Urinary tract infection associated with indwelling urethral catheter, initial encounter (CMS/formerly [facility])    I discussed the situation with the  was by the bedside.  He tells me that even though she is able to eat some food in the hospital, her intake in the home is very meager and she is continue to lose weight she has no dysphagia noted.   Apparently she also has some degree of dementia and mentation varies.  Today she was rather reluctant to have any further testing done.  The  has a power of  and he would like to have a PEG tube put in doing to give better nutrition to the patient in addition to what she can take by mouth.  I also discussed with Dr. Ghosh at the primary care physician who also agreed this should be done.  The  gave her consent for this and we will proceed with the same tomorrow  .  The patient has been off anticoagulants since Saturday.  Produced using Dragon dictation technology and unintended errors of omission commission may be present in spite of proofreading       I spent 40 minutes in the professional and overall care of this patient.      Lakshmi Medina MD

## 2024-03-04 NOTE — CARE PLAN
The patient's goals for the shift include      The clinical goals for the shift include treat wounds and encourage PO intake    Over the shift, the patient did not make progress toward the following goals. Barriers to progression include cognative decline.  Problem: Skin  Goal: Decreased wound size/increased tissue granulation at next dressing change  Outcome: Progressing  Goal: Participates in plan/prevention/treatment measures  Outcome: Progressing  Goal: Prevent/manage excess moisture  Outcome: Progressing  Goal: Prevent/minimize sheer/friction injuries  Outcome: Progressing  Goal: Promote/optimize nutrition  Outcome: Progressing  Goal: Promote skin healing  Outcome: Progressing     Problem: Pain - Adult  Goal: Verbalizes/displays adequate comfort level or baseline comfort level  Outcome: Progressing     Problem: Safety - Adult  Goal: Free from fall injury  Outcome: Progressing     Problem: Chronic Conditions and Co-morbidities  Goal: Patient's chronic conditions and co-morbidity symptoms are monitored and maintained or improved  Outcome: Progressing     Problem: Diabetes  Goal: Achieve decreasing blood glucose levels by end of shift  Outcome: Progressing  Goal: Increase stability of blood glucose readings by end of shift  Outcome: Progressing  Goal: Decrease in ketones present in urine by end of shift  Outcome: Progressing  Goal: Maintain electrolyte levels within acceptable range throughout shift  Outcome: Progressing  Goal: Maintain glucose levels >70mg/dl to <250mg/dl throughout shift  Outcome: Progressing  Goal: No changes in neurological exam by end of shift  Outcome: Progressing  Goal: Learn about and adhere to nutrition recommendations by end of shift  Outcome: Progressing  Goal: Vital signs within normal range for age by end of shift  Outcome: Progressing  Goal: Increase self care and/or family involovement by end of shift  Outcome: Progressing  Goal: Receive DSME education by end of shift  Outcome:  Progressing

## 2024-03-04 NOTE — PROGRESS NOTES
Balaji Fitzgerald MD  Physician  Internal Medicine     Progress Notes     Signed     Date of Service: 3/3/2024  4:26 PM     Signed       Expand All Collapse All    Irina Rodrigues is a 73 y.o. female on day 8 of admission presenting with UTI (urinary tract infection).           Subjective   Patient fully evaluated on February 28 and clinically improved.              Objective   Last Recorded Vitals  /75   Pulse 87   Temp 36.8 °C (98.2 °F)   Resp 18   Wt 53.5 kg (117 lb 15.1 oz)   SpO2 93%   Intake/Output last 3 Shifts:     Intake/Output Summary (Last 24 hours) at 3/3/2024 1626  Last data filed at 3/3/2024 1500      Gross per 24 hour   Intake 990 ml   Output 1450 ml   Net -460 ml            Admission Weight  Weight: 52 kg (114 lb 10.2 oz) (02/24/24 0300)     Daily Weight  03/03/24 : 53.5 kg (117 lb 15.1 oz)     Image Results  Transthoracic Echo (TTE) [facility], [address] [place], [place] [address]Tel [phone] and                                  Fax [phone]     TRANSTHORACIC ECHOCARDIOGRAM REPORT        Patient Name:      IRINA RODRIGUES     Reading Physician:    [id number] Garrett Daley MD  Study Date:        2/25/2024            Ordering Provider:    [id number] PASTOR HEARD  MRN/PID:           [id number]             Fellow:  Accession#:        [id number]         Nurse:  Date of Birth/Age: 1950 / 73 years Sonographer:          Sonam Martinez RDCS  Gender:            F                    Additional Staff:  Height:            167.00 cm            Admit Date:           2/24/2024  Weight:            56.00 kg             Admission Status:     Inpatient -                                                                Priority discharge  BSA / BMI:         1.62 m2 / 20.08      Encounter#:           [id number]                     kg/m2                                           Department Location:  [facility]  Blood Pressure: 127 /59 mmHg     Study Type:    TRANSTHORACIC ECHO (TTE) COMPLETE  Diagnosis/ICD: Elevated Troponin-R79.89  Indication:    Elevated Troponin  CPT Code:      Echo Complete w Full Doppler-12183     Patient History:  Pertinent History: A-Fib, AAA and HTN.     Study Detail: The following Echo studies were performed: 2D, M-Mode, Doppler and                color flow. Technically challenging study due to prominent lung                artifact.        PHYSICIAN INTERPRETATION:  Left Ventricle: The left ventricular systolic function is low normal, with an estimated ejection fraction of 50%. There are no regional wall motion abnormalities. The left ventricular cavity size is normal. Spectral Doppler shows an impaired relaxation pattern of left ventricular diastolic filling.  Left Atrium: The left atrium is upper limits of normal in size.  Right Ventricle: The right ventricle is normal in size. There is normal right ventricular global systolic function.  Right Atrium: The right atrium is normal in size.  Aortic Valve: The aortic valve is trileaflet. There is evidence of mild aortic valve stenosis.  There is no evidence of aortic valve regurgitation. The peak instantaneous gradient of the aortic valve is 11.3 mmHg. The mean gradient of the aortic valve is 6.0 mmHg.  Mitral Valve: The mitral valve is normal in structure. There is mild mitral valve regurgitation.  Tricuspid Valve: The tricuspid valve is structurally normal. No evidence of tricuspid regurgitation.  Pulmonic Valve: The pulmonic valve is structurally normal. There is no indication of pulmonic valve regurgitation.  Pericardium: There is no pericardial effusion noted.  Aorta: The aortic root is normal.        CONCLUSIONS:   1. Left ventricular systolic function is low normal with a 50% estimated ejection fraction.   2. Spectral Doppler shows an impaired relaxation pattern of left  ventricular diastolic filling.   3. Mild aortic valve stenosis.     QUANTITATIVE DATA SUMMARY:  2D MEASUREMENTS:                            Normal Ranges:  IVSd:          1.05 cm    (0.6-1.1cm)  LVPWd:         1.02 cm    (0.6-1.1cm)  LVIDd:         4.78 cm    (3.9-5.9cm)  LVIDs:         3.27 cm  LV Mass Index: 109.0 g/m2  LV % FS        31.6 %     AORTA MEASUREMENTS:                     Normal Ranges:  Asc Ao, d: 3.20 cm (2.1-3.4cm)     LV SYSTOLIC FUNCTION BY 2D PLANIMETRY (MOD):                      Normal Ranges:  EF-A4C View: 52.0 % (>=55%)  EF-A2C View: 45.9 %  EF-Biplane:  49.7 %     LV DIASTOLIC FUNCTION:                         Normal Ranges:  MV Peak E:    0.93 m/s (0.7-1.2 m/s)  MV Peak A:    0.92 m/s (0.42-0.7 m/s)  E/A Ratio:    1.01     (1.0-2.2)  MV lateral e' 0.09 m/s  MV medial e'  0.08 m/s     MITRAL VALVE:                  Normal Ranges:  MV DT: 218 msec (150-240msec)     AORTIC VALVE:                                     Normal Ranges:  AoV Vmax:                1.68 m/s  (<=1.7m/s)  AoV Peak P.3 mmHg (<20mmHg)  AoV Mean P.0 mmHg  (1.7-11.5mmHg)  LVOT Max Oscar:            0.63 m/s  (<=1.1m/s)  AoV VTI:                 41.50 cm  (18-25cm)  LVOT VTI:                15.70 cm  LVOT Diameter:           2.20 cm   (1.8-2.4cm)  AoV Area, VTI:           1.44 cm2  (2.5-5.5cm2)  AoV Area,Vmax:           1.42 cm2  (2.5-4.5cm2)  AoV Dimensionless Index: 0.38        RIGHT VENTRICLE:  RV Basal 2.39 cm  RV Mid   1.60 cm  RV Major 7.0 cm  TAPSE:   20.5 mm  RV s'    0.16 m/s     TRICUSPID VALVE/RVSP:                              Normal Ranges:  Peak TR Velocity: 2.36 m/s  RV Syst Pressure: 25.3 mmHg (< 30mmHg)     PULMONIC VALVE:                       Normal Ranges:  PV Max Oscar: 0.8 m/s  (0.6-0.9m/s)  PV Max P.3 mmHg  PV Mean P.0 mmHg  PV VTI:     18.50 cm        [id number] Garrett Daley MD  Electronically signed on 2024 at 1:51:45 PM        ** Final **        Physical Exam      Relevant Results                       Assessment/Plan      H&P     Addendum     Date of Service: 2024  5:46 AM      Addendum        Expand All Collapse All    History Of Present Illness  Irina Stratton is a 73 y.o. female with past medical history of Parkinson's, CKD, hypothyroidism, atrial fibrillation on Xarelto, diabetes, hypertension, hyperlipidemia, depression, MI, sacral osteomyelitis, and cholelithiasis, who presented to [facility] ED today via EMS from home for lethargy. EMS stated she had a fever en route. Patient has a albarran catheter that was recently changed on Wednesday.  stated patient has been more lethargic than normal. He states she does not communicate much and is non-ambulatory at baseline. Additional history obtained from chart.     ED course: Temp 37.5C, /71, HR 96, RR 20, 95% RA. EKG unavailable for my review. Labs: WBC 12.0, neutrophils 10.04. RBC 3.98, H&H 10.9/36.4.[id number]. Sodium 135. BUN 29. . Troponin 13. UA: hazy, 3+protein, moderate blood, small leukocytes, 1+bacteria, RBC>20, WBC 21-50. Urine and blood cultures collected. See imaging results below. Azithromycin, zosyn, vancomycin started in ED. Patient will be admitted under the care of Dr. Fitzgerald who will continue to follow. I was asked to H&P and place initial admission orders.  Past Medical History  Medical History           Past Medical History:   Diagnosis Date    Abnormal radiologic findings on diagnostic imaging of right kidney       Abnormal ultrasound of both kidneys    Personal history of other medical treatment       History of echocardiogram            Surgical History  Surgical History             Past Surgical History:   Procedure Laterality Date     SECTION, CLASSIC   2016      Section    CT ABDOMEN PELVIS ANGIOGRAM W AND/OR WO IV CONTRAST   2019     CT ABDOMEN PELVIS ANGIOGRAM W AND/OR WO IV CONTRAST 2019 [facility]GAC[facility]    CT AORTA AND BILATERAL  ILIOFEMORAL RUNOFF ANGIOGRAM W AND/OR WO IV CONTRAST   5/31/2020     CT AORTA AND BILATERAL ILIOFEMORAL RUNOFF ANGIOGRAM W AND/OR WO IV CONTRAST 5/31/2020 [facility] CLINICAL LEGACY    OTHER SURGICAL HISTORY   06/17/2020     History of prior surgery    OTHER SURGICAL HISTORY   01/31/2020     Coronary artery stent placement    OTHER SURGICAL HISTORY   06/11/2020     Abdominal aortic aneurysm repair endovascular    OTHER SURGICAL HISTORY   01/31/2020     Cardiac catheterization    OTHER SURGICAL HISTORY   01/17/2019     Renal angioplasty and stenting            Social History  She reports that she quit smoking about 13 months ago. Her smoking use included cigarettes. She has never used smokeless tobacco. She reports that she does not currently use alcohol. She reports that she does not use drugs. Lives with . Bedbound.     Family History  Family History               Family History   Problem Relation Name Age of Onset    Stroke Mother                Allergies  Patient has no known allergies.     Review of Systems   Limited ROS due to patient mentation.  Physical Exam  Constitutional:       Appearance: She is ill-appearing.      Comments: Cachetic appearance   HENT:      Head: Normocephalic and atraumatic.      Nose: Nose normal.      Mouth/Throat:      Mouth: Mucous membranes are moist.      Pharynx: Oropharynx is clear.      Comments: Poor dentition  Eyes:      Extraocular Movements: Extraocular movements intact.      Conjunctiva/sclera: Conjunctivae normal.      Pupils: Pupils are equal, round, and reactive to light.   Cardiovascular:      Rate and Rhythm: Normal rate and regular rhythm.      Pulses: Normal pulses.      Heart sounds: Normal heart sounds.   Pulmonary:      Effort: Pulmonary effort is normal.      Breath sounds: Normal breath sounds.   Abdominal:      General: Abdomen is flat. Bowel sounds are normal.      Palpations: Abdomen is soft.   Genitourinary:     Comments: Rain in place  Musculoskeletal:          General: Normal range of motion.      Cervical back: Normal range of motion and neck supple.   Skin:     General: Skin is warm and dry.      Capillary Refill: Capillary refill takes 2 to 3 seconds.      Comments: Dressing to sacrum   Neurological:      General: No focal deficit present.      Mental Status: She is alert. She is disoriented.      Comments: Answered some questions and followed some commands. Confusion noted.   Psychiatric:         Mood and Affect: Mood normal.            Last Recorded Vitals  Blood pressure 140/70, pulse 98, temperature 37.5 °C (99.5 °F), temperature source Temporal, resp. rate 18, weight 52 kg (114 lb 10.2 oz), SpO2 94 %.     Relevant Results            Results for orders placed or performed during the hospital encounter of 02/23/24 (from the past 24 hour(s))   CBC and Auto Differential   Result Value Ref Range     WBC 12.0 (H) 4.4 - 11.3 x10*3/uL     nRBC 0.0 0.0 - 0.0 /100 WBCs     RBC 3.93 (L) 4.00 - 5.20 x10*6/uL     Hemoglobin 10.9 (L) 12.0 - 16.0 g/dL     Hematocrit 36.4 36.0 - 46.0 %     MCV 93 80 - 100 fL     MCH 27.7 26.0 - 34.0 pg     MCHC 29.9 (L) 32.0 - 36.0 g/dL     RDW 19.6 (H) 11.5 - 14.5 %     Platelets 232 150 - 450 x10*3/uL     Neutrophils % 83.7 40.0 - 80.0 %     Immature Granulocytes %, Automated 0.5 0.0 - 0.9 %     Lymphocytes % 7.8 13.0 - 44.0 %     Monocytes % 7.4 2.0 - 10.0 %     Eosinophils % 0.3 0.0 - 6.0 %     Basophils % 0.3 0.0 - 2.0 %     Neutrophils Absolute 10.04 (H) 1.60 - 5.50 x10*3/uL     Immature Granulocytes Absolute, Automated 0.06 0.00 - 0.50 x10*3/uL     Lymphocytes Absolute 0.94 0.80 - 3.00 x10*3/uL     Monocytes Absolute 0.89 (H) 0.05 - 0.80 x10*3/uL     Eosinophils Absolute 0.04 0.00 - 0.40 x10*3/uL     Basophils Absolute 0.04 0.00 - 0.10 x10*3/uL   Comprehensive Metabolic Panel   Result Value Ref Range     Glucose 166 (H) 74 - 99 mg/dL     Sodium 135 (L) 136 - 145 mmol/L     Potassium 4.1 3.5 - 5.3 mmol/L     Chloride 100 98 - 107  mmol/L     Bicarbonate 27 21 - 32 mmol/L     Anion Gap 12 10 - 20 mmol/L     Urea Nitrogen 29 (H) 6 - 23 mg/dL     Creatinine 0.80 0.50 - 1.05 mg/dL     eGFR 78 >60 mL/min/1.73m*2     Calcium 9.1 8.6 - 10.3 mg/dL     Albumin 3.4 3.4 - 5.0 g/dL     Alkaline Phosphatase 102 33 - 136 U/L     Total Protein 8.4 (H) 6.4 - 8.2 g/dL     AST 27 9 - 39 U/L     Bilirubin, Total 0.7 0.0 - 1.2 mg/dL     ALT 18 7 - 45 U/L   Lactate   Result Value Ref Range     Lactate 1.9 0.4 - 2.0 mmol/L   B-Type Natriuretic Peptide   Result Value Ref Range      (H) 0 - 99 pg/mL   Troponin I, High Sensitivity   Result Value Ref Range     Troponin I, High Sensitivity 13 0 - 13 ng/L   Urinalysis with Reflex Culture and Microscopic   Result Value Ref Range     Color, Urine Yellow Straw, Yellow     Appearance, Urine Hazy (N) Clear     Specific Gravity, Urine 1.017 1.005 - 1.035     pH, Urine 8.0 5.0, 5.5, 6.0, 6.5, 7.0, 7.5, 8.0     Protein, Urine >=500 (3+) (N) NEGATIVE mg/dL     Glucose, Urine NEGATIVE NEGATIVE mg/dL     Blood, Urine MODERATE (2+) (A) NEGATIVE     Ketones, Urine NEGATIVE NEGATIVE mg/dL     Bilirubin, Urine NEGATIVE NEGATIVE     Urobilinogen, Urine <2.0 <2.0 mg/dL     Nitrite, Urine NEGATIVE NEGATIVE     Leukocyte Esterase, Urine SMALL (1+) (A) NEGATIVE   Microscopic Only, Urine   Result Value Ref Range     WBC, Urine 21-50 (A) 1-5, NONE /HPF     RBC, Urine >20 (A) NONE, 1-2, 3-5 /HPF     Bacteria, Urine 1+ (A) NONE SEEN /HPF      XR chest 1 view     Result Date: 2/24/2024  STUDY: Chest Radiograph;  02/23/2024 11:02 PM INDICATION: Sepsis, evaluate for source.  COMPARISON: XR chest 12/04/2022.  CT chest/abdomen/pelvis 06/01/2023.  ACCESSION NUMBER(S): [id number] ORDERING CLINICIAN: KENNA W BUCCA TECHNIQUE:  Frontal chest was obtained at 2302 hours. FINDINGS: CARDIOMEDIASTINAL SILHOUETTE: Cardiomediastinal silhouette is enlarged in size and configuration. Calcified plaque is seen in the aorta.  LUNGS: Increased  interstitial markings are seen bilaterally with subtle patchy right basilar airspace opacity.  ABDOMEN: No remarkable upper abdominal findings.  BONES: No acute osseous changes.  Generalized osseous demineralization is noted.     Cardiomegaly with increased interstitial markings and subtle patchy right infrahilar airspace opacity.  Mild pulmonary edema secondary to CHF is favored although right lower lobe pneumonia is a possibility in the appropriate clinical setting. Signed by Salvatore Parada            Assessment/Plan   Principal Problem:    UTI (urinary tract infection)      73-year-old female with past medical history of Parkinson's, CKD, hypothyroidism, atrial fibrillation on Xarelto, diabetes, hypertension, hyperlipidemia, depression, MI, sacral osteomyelitis, and cholelithiasis, who presented to [facility] ED today via EMS from home for lethargy. EMS stated she had a fever en route. Patient has a albarran catheter that was recently changed on Wednesday.  Continue antibiotics. Patient to be hospitalized for further medical management.      #Suspect sepsis likely 2/2 to pneumonia vs UTI  #Suspect pneumonia RLL  #Suspected UTI likely 2/2 chronic indwelling albarran catheter  #Elevated BNP  #Acute metabolic encephalopathy likely 2/2 suspected UTI/ pneumonia   #Leukocytosis  Admit to OBS/telemetry to Dr. Fitzgerald  Defer consults to attending per request  See imaging results above  Echocardiogram ordered  Continue Azithromycin, Zosyn and vancomycin. De-escalate as appropriate  Strep pneumonia urine and legionella ordered  Sputum culture ordered  Titrate oxygen as needed  Blood and Urine cultures pending  Change albarran catheter  Daily weight  PT/OT  Social work  Repeat labs in AM     #Pressure ulcer of sacral region, stage 4  #Pressure ulcer left hip, stage 1  Continue wound care regimen  Air mattress  Turn q 2 hours     #Acute on chronic anemia  H&H 10.9/36.4  Continue to monitor     Chronic issues  #Parkinson's  disease  #CKD  #Hypothyroidism  #Atrial fibrillation  #DMII  #HTN  #HLD  #Depression  #MI  #Cholelithiasis  Continue home medications as appropriate when nursing completes home med rec  SSI with hypoglycemic protocol     #DVT prophylaxis  Continue home Xarelto  SCDs     I spent 45 minutes in the professional and overall care of this patient.           Addendum patient seen and evaluated agree with all the above to treat her for a urinary tract infection change in mental status history of Parkinson's repeat the CBC and the electrolytes continue with all above management thank you                     Revision History          This patient has a urinary catheter              Reason for the urinary catheter remaining today?               Principal Problem:    UTI (urinary tract infection)  Active Problems:    Urinary tract infection associated with indwelling urethral catheter, initial encounter (CMS/[facility])              Malnutrition     Patient fully evaluated on February 29.  IV antibiotics have been discontinued will monitor overnight.  Recheck labs in AM.  Remeron added for appetite stimulant  I agree with the dietitian's malnutrition diagnosis.        Patient fully evaluated on February 28.  Urine culture results are noted.  Continue present IV antibiotics for additional 24 hours and recheck labs in a.m.  Patient fully evaluated on March 1.  Still with  and significant oral intake.  Family agreeable to PEG placement.  Continue present IV antibiotics for now     Patient fully evaluated on March 2.  Slight improvement in oral intake.  Patient agreeable to PEG placement now.  More awake and alert after narcotics were de-escalated.     Patient fully evaluated on March 3 and appetite slightly improved.  Continue puréed diet.  Await PEG placement.    Patient fully evaluated on March 4. Appetite still continues to be slightly improved. Patient continues to await PEG placement.     Balaji Fitzgerald MD

## 2024-03-04 NOTE — CARE PLAN
The patient's goals for the shift include      The clinical goals for the shift include maintain wound dressings per order      Problem: Skin  Goal: Prevent/minimize sheer/friction injuries  Outcome: Progressing

## 2024-03-05 NOTE — CARE PLAN
The patient's goals for the shift include      The clinical goals for the shift include Q2 turns    Over the shift, the patient did not make progress toward the following goals. Barriers to progression include patient refusal. Recommendations to address these barriers include continue educating patient on the importance of repositioning.

## 2024-03-05 NOTE — PROGRESS NOTES
12,001 me see her will be gotten Jenny Fitzgerald MD  Physician  Internal Medicine     Progress Notes     Signed     Date of Service: 3/3/2024  4:26 PM     Signed       Expand All Collapse All    Irina Rodrigues is a 73 y.o. female on day 8 of admission presenting with UTI (urinary tract infection).           Subjective   Patient fully evaluated on February 28 and clinically improved.         , Alert good okayObjective   Last Recorded Vitals  /75   Pulse 87   Temp 36.8 °C (98.2 °F)   Resp 18   Wt 53.5 kg (117 lb 15.1 oz)   SpO2 93%   Intake/Output last 3 Shifts:     Intake/Output Summary (Last 24 hours) at 3/3/2024 1626  Last data filed at 3/3/2024 1500      Gross per 24 hour   Intake 990 ml   Output 1450 ml   Net -460 ml            Admission Weight  Weight: 52 kg (114 lb 10.2 oz) (02/24/24 0300)     Daily Weight  03/03/24 : 53.5 kg (117 lb 15.1 oz)     Image Results  Transthoracic Echo (TTE) [facility], [address] [place], [place] [address]Tel [phone] and                                  Fax [phone]     TRANSTHORACIC ECHOCARDIOGRAM REPORT        Patient Name:      IRINA RODRIGUES     Reading Physician:    [id number] Garrett Daley MD  Study Date:        2/25/2024            Ordering Provider:    [id number] PASTOR HEARD  MRN/PID:           [id number]             Fellow:  Accession#:        [id number]         Nurse:  Date of Birth/Age: 1950 / 73 years Sonographer:          Sonam Martinez RDCS  Gender:            F                    Additional Staff:  Height:            167.00 cm            Admit Date:           2/24/2024  Weight:            56.00 kg             Admission Status:     Inpatient -                                                                Priority discharge  BSA / BMI:         1.62 m2 / 20.08      Encounter#:           [id number]                      kg/m2                                          Department Location:  [facility]  Blood Pressure: 127 /59 mmHg     Study Type:    TRANSTHORACIC ECHO (TTE) COMPLETE  Diagnosis/ICD: Elevated Troponin-R79.89  Indication:    Elevated Troponin  CPT Code:      Echo Complete w Full Doppler-02557     Patient History:  Pertinent History: A-Fib, AAA and HTN.     Study Detail: The following Echo studies were performed: 2D, M-Mode, Doppler and                color flow. Technically challenging study due to prominent lung                artifact.        PHYSICIAN INTERPRETATION:  Left Ventricle: The left ventricular systolic function is low normal, with an estimated ejection fraction of 50%. There are no regional wall motion abnormalities. The left ventricular cavity size is normal. Spectral Doppler shows an impaired relaxation pattern of left ventricular diastolic filling.  Left Atrium: The left atrium is upper limits of normal in size.  Right Ventricle: The right ventricle is normal in size. There is normal right ventricular global systolic function.  Right Atrium: The right atrium is normal in size.  Aortic Valve: The aortic valve is trileaflet. There is evidence of mild aortic valve stenosis.  There is no evidence of aortic valve regurgitation. The peak instantaneous gradient of the aortic valve is 11.3 mmHg. The mean gradient of the aortic valve is 6.0 mmHg.  Mitral Valve: The mitral valve is normal in structure. There is mild mitral valve regurgitation.  Tricuspid Valve: The tricuspid valve is structurally normal. No evidence of tricuspid regurgitation.  Pulmonic Valve: The pulmonic valve is structurally normal. There is no indication of pulmonic valve regurgitation.  Pericardium: There is no pericardial effusion noted.  Aorta: The aortic root is normal.        CONCLUSIONS:   1. Left ventricular systolic function is low normal with a 50% estimated ejection fraction.   2. Spectral Doppler shows an  impaired relaxation pattern of left ventricular diastolic filling.   3. Mild aortic valve stenosis.     QUANTITATIVE DATA SUMMARY:  2D MEASUREMENTS:                            Normal Ranges:  IVSd:          1.05 cm    (0.6-1.1cm)  LVPWd:         1.02 cm    (0.6-1.1cm)  LVIDd:         4.78 cm    (3.9-5.9cm)  LVIDs:         3.27 cm  LV Mass Index: 109.0 g/m2  LV % FS        31.6 %     AORTA MEASUREMENTS:                     Normal Ranges:  Asc Ao, d: 3.20 cm (2.1-3.4cm)     LV SYSTOLIC FUNCTION BY 2D PLANIMETRY (MOD):                      Normal Ranges:  EF-A4C View: 52.0 % (>=55%)  EF-A2C View: 45.9 %  EF-Biplane:  49.7 %     LV DIASTOLIC FUNCTION:                         Normal Ranges:  MV Peak E:    0.93 m/s (0.7-1.2 m/s)  MV Peak A:    0.92 m/s (0.42-0.7 m/s)  E/A Ratio:    1.01     (1.0-2.2)  MV lateral e' 0.09 m/s  MV medial e'  0.08 m/s     MITRAL VALVE:                  Normal Ranges:  MV DT: 218 msec (150-240msec)     AORTIC VALVE:                                     Normal Ranges:  AoV Vmax:                1.68 m/s  (<=1.7m/s)  AoV Peak P.3 mmHg (<20mmHg)  AoV Mean P.0 mmHg  (1.7-11.5mmHg)  LVOT Max Oscar:            0.63 m/s  (<=1.1m/s)  AoV VTI:                 41.50 cm  (18-25cm)  LVOT VTI:                15.70 cm  LVOT Diameter:           2.20 cm   (1.8-2.4cm)  AoV Area, VTI:           1.44 cm2  (2.5-5.5cm2)  AoV Area,Vmax:           1.42 cm2  (2.5-4.5cm2)  AoV Dimensionless Index: 0.38        RIGHT VENTRICLE:  RV Basal 2.39 cm  RV Mid   1.60 cm  RV Major 7.0 cm  TAPSE:   20.5 mm  RV s'    0.16 m/s     TRICUSPID VALVE/RVSP:                              Normal Ranges:  Peak TR Velocity: 2.36 m/s  RV Syst Pressure: 25.3 mmHg (< 30mmHg)     PULMONIC VALVE:                       Normal Ranges:  PV Max Oscar: 0.8 m/s  (0.6-0.9m/s)  PV Max P.3 mmHg  PV Mean P.0 mmHg  PV VTI:     18.50 cm        [id number] Garrett Daley MD  Electronically signed on 2024 at 1:51:45 PM        **  Final **        Physical Exam     Relevant Results                       Assessment/Plan      H&P     Addendum     Date of Service: 2024  5:46 AM      Addendum        Expand All Collapse All    History Of Present Illness  Irina Stratton is a 73 y.o. female with past medical history of Parkinson's, CKD, hypothyroidism, atrial fibrillation on Xarelto, diabetes, hypertension, hyperlipidemia, depression, MI, sacral osteomyelitis, and cholelithiasis, who presented to [facility] ED today via EMS from home for lethargy. EMS stated she had a fever en route. Patient has a albarran catheter that was recently changed on Wednesday.  stated patient has been more lethargic than normal. He states she does not communicate much and is non-ambulatory at baseline. Additional history obtained from chart.     ED course: Temp 37.5C, /71, HR 96, RR 20, 95% RA. EKG unavailable for my review. Labs: WBC 12.0, neutrophils 10.04. RBC 3.98, H&H 10.9/36.4.[id number]. Sodium 135. BUN 29. . Troponin 13. UA: hazy, 3+protein, moderate blood, small leukocytes, 1+bacteria, RBC>20, WBC 21-50. Urine and blood cultures collected. See imaging results below. Azithromycin, zosyn, vancomycin started in ED. Patient will be admitted under the care of Dr. Fitzgerald who will continue to follow. I was asked to H&P and place initial admission orders.  Past Medical History  Medical History           Past Medical History:   Diagnosis Date    Abnormal radiologic findings on diagnostic imaging of right kidney       Abnormal ultrasound of both kidneys    Personal history of other medical treatment       History of echocardiogram            Surgical History  Surgical History             Past Surgical History:   Procedure Laterality Date     SECTION, CLASSIC   2016      Section    CT ABDOMEN PELVIS ANGIOGRAM W AND/OR WO IV CONTRAST   2019     CT ABDOMEN PELVIS ANGIOGRAM W AND/OR WO IV CONTRAST 2019 [facility]     CT AORTA AND BILATERAL ILIOFEMORAL RUNOFF ANGIOGRAM W AND/OR WO IV CONTRAST   5/31/2020     CT AORTA AND BILATERAL ILIOFEMORAL RUNOFF ANGIOGRAM W AND/OR WO IV CONTRAST 5/31/2020 [facility] CLINICAL LEGACY    OTHER SURGICAL HISTORY   06/17/2020     History of prior surgery    OTHER SURGICAL HISTORY   01/31/2020     Coronary artery stent placement    OTHER SURGICAL HISTORY   06/11/2020     Abdominal aortic aneurysm repair endovascular    OTHER SURGICAL HISTORY   01/31/2020     Cardiac catheterization    OTHER SURGICAL HISTORY   01/17/2019     Renal angioplasty and stenting            Social History  She reports that she quit smoking about 13 months ago. Her smoking use included cigarettes. She has never used smokeless tobacco. She reports that she does not currently use alcohol. She reports that she does not use drugs. Lives with . Bedbound.     Family History  Family History               Family History   Problem Relation Name Age of Onset    Stroke Mother                Allergies  Patient has no known allergies.     Review of Systems   Limited ROS due to patient mentation.  Physical Exam  Constitutional:       Appearance: She is ill-appearing.      Comments: Cachetic appearance   HENT:      Head: Normocephalic and atraumatic.      Nose: Nose normal.      Mouth/Throat:      Mouth: Mucous membranes are moist.      Pharynx: Oropharynx is clear.      Comments: Poor dentition  Eyes:      Extraocular Movements: Extraocular movements intact.      Conjunctiva/sclera: Conjunctivae normal.      Pupils: Pupils are equal, round, and reactive to light.   Cardiovascular:      Rate and Rhythm: Normal rate and regular rhythm.      Pulses: Normal pulses.      Heart sounds: Normal heart sounds.   Pulmonary:      Effort: Pulmonary effort is normal.      Breath sounds: Normal breath sounds.   Abdominal:      General: Abdomen is flat. Bowel sounds are normal.      Palpations: Abdomen is soft.   Genitourinary:     Comments: Rain in  place  Musculoskeletal:         General: Normal range of motion.      Cervical back: Normal range of motion and neck supple.   Skin:     General: Skin is warm and dry.      Capillary Refill: Capillary refill takes 2 to 3 seconds.      Comments: Dressing to sacrum   Neurological:      General: No focal deficit present.      Mental Status: She is alert. She is disoriented.      Comments: Answered some questions and followed some commands. Confusion noted.   Psychiatric:         Mood and Affect: Mood normal.            Last Recorded Vitals  Blood pressure 140/70, pulse 98, temperature 37.5 °C (99.5 °F), temperature source Temporal, resp. rate 18, weight 52 kg (114 lb 10.2 oz), SpO2 94 %.     Relevant Results            Results for orders placed or performed during the hospital encounter of 02/23/24 (from the past 24 hour(s))   CBC and Auto Differential   Result Value Ref Range     WBC 12.0 (H) 4.4 - 11.3 x10*3/uL     nRBC 0.0 0.0 - 0.0 /100 WBCs     RBC 3.93 (L) 4.00 - 5.20 x10*6/uL     Hemoglobin 10.9 (L) 12.0 - 16.0 g/dL     Hematocrit 36.4 36.0 - 46.0 %     MCV 93 80 - 100 fL     MCH 27.7 26.0 - 34.0 pg     MCHC 29.9 (L) 32.0 - 36.0 g/dL     RDW 19.6 (H) 11.5 - 14.5 %     Platelets 232 150 - 450 x10*3/uL     Neutrophils % 83.7 40.0 - 80.0 %     Immature Granulocytes %, Automated 0.5 0.0 - 0.9 %     Lymphocytes % 7.8 13.0 - 44.0 %     Monocytes % 7.4 2.0 - 10.0 %     Eosinophils % 0.3 0.0 - 6.0 %     Basophils % 0.3 0.0 - 2.0 %     Neutrophils Absolute 10.04 (H) 1.60 - 5.50 x10*3/uL     Immature Granulocytes Absolute, Automated 0.06 0.00 - 0.50 x10*3/uL     Lymphocytes Absolute 0.94 0.80 - 3.00 x10*3/uL     Monocytes Absolute 0.89 (H) 0.05 - 0.80 x10*3/uL     Eosinophils Absolute 0.04 0.00 - 0.40 x10*3/uL     Basophils Absolute 0.04 0.00 - 0.10 x10*3/uL   Comprehensive Metabolic Panel   Result Value Ref Range     Glucose 166 (H) 74 - 99 mg/dL     Sodium 135 (L) 136 - 145 mmol/L     Potassium 4.1 3.5 - 5.3 mmol/L      Chloride 100 98 - 107 mmol/L     Bicarbonate 27 21 - 32 mmol/L     Anion Gap 12 10 - 20 mmol/L     Urea Nitrogen 29 (H) 6 - 23 mg/dL     Creatinine 0.80 0.50 - 1.05 mg/dL     eGFR 78 >60 mL/min/1.73m*2     Calcium 9.1 8.6 - 10.3 mg/dL     Albumin 3.4 3.4 - 5.0 g/dL     Alkaline Phosphatase 102 33 - 136 U/L     Total Protein 8.4 (H) 6.4 - 8.2 g/dL     AST 27 9 - 39 U/L     Bilirubin, Total 0.7 0.0 - 1.2 mg/dL     ALT 18 7 - 45 U/L   Lactate   Result Value Ref Range     Lactate 1.9 0.4 - 2.0 mmol/L   B-Type Natriuretic Peptide   Result Value Ref Range      (H) 0 - 99 pg/mL   Troponin I, High Sensitivity   Result Value Ref Range     Troponin I, High Sensitivity 13 0 - 13 ng/L   Urinalysis with Reflex Culture and Microscopic   Result Value Ref Range     Color, Urine Yellow Straw, Yellow     Appearance, Urine Hazy (N) Clear     Specific Gravity, Urine 1.017 1.005 - 1.035     pH, Urine 8.0 5.0, 5.5, 6.0, 6.5, 7.0, 7.5, 8.0     Protein, Urine >=500 (3+) (N) NEGATIVE mg/dL     Glucose, Urine NEGATIVE NEGATIVE mg/dL     Blood, Urine MODERATE (2+) (A) NEGATIVE     Ketones, Urine NEGATIVE NEGATIVE mg/dL     Bilirubin, Urine NEGATIVE NEGATIVE     Urobilinogen, Urine <2.0 <2.0 mg/dL     Nitrite, Urine NEGATIVE NEGATIVE     Leukocyte Esterase, Urine SMALL (1+) (A) NEGATIVE   Microscopic Only, Urine   Result Value Ref Range     WBC, Urine 21-50 (A) 1-5, NONE /HPF     RBC, Urine >20 (A) NONE, 1-2, 3-5 /HPF     Bacteria, Urine 1+ (A) NONE SEEN /HPF      XR chest 1 view     Result Date: 2/24/2024  STUDY: Chest Radiograph;  02/23/2024 11:02 PM INDICATION: Sepsis, evaluate for source.  COMPARISON: XR chest 12/04/2022.  CT chest/abdomen/pelvis 06/01/2023.  ACCESSION NUMBER(S): [id number] ORDERING CLINICIAN: KENNA ALCALA TECHNIQUE:  Frontal chest was obtained at 2302 hours. FINDINGS: CARDIOMEDIASTINAL SILHOUETTE: Cardiomediastinal silhouette is enlarged in size and configuration. Calcified plaque is seen in the aorta.   LUNGS: Increased interstitial markings are seen bilaterally with subtle patchy right basilar airspace opacity.  ABDOMEN: No remarkable upper abdominal findings.  BONES: No acute osseous changes.  Generalized osseous demineralization is noted.     Cardiomegaly with increased interstitial markings and subtle patchy right infrahilar airspace opacity.  Mild pulmonary edema secondary to CHF is favored although right lower lobe pneumonia is a possibility in the appropriate clinical setting. Signed by Salvatore Parada            Assessment/Plan   Principal Problem:    UTI (urinary tract infection)      73-year-old female with past medical history of Parkinson's, CKD, hypothyroidism, atrial fibrillation on Xarelto, diabetes, hypertension, hyperlipidemia, depression, MI, sacral osteomyelitis, and cholelithiasis, who presented to [facility] ED today via EMS from home for lethargy. EMS stated she had a fever en route. Patient has a albarran catheter that was recently changed on Wednesday.  Continue antibiotics. Patient to be hospitalized for further medical management.      #Suspect sepsis likely 2/2 to pneumonia vs UTI  #Suspect pneumonia RLL  #Suspected UTI likely 2/2 chronic indwelling albarran catheter  #Elevated BNP  #Acute metabolic encephalopathy likely 2/2 suspected UTI/ pneumonia   #Leukocytosis  Admit to OBS/telemetry to Dr. Fitzgerald  Defer consults to attending per request  See imaging results above  Echocardiogram ordered  Continue Azithromycin, Zosyn and vancomycin. De-escalate as appropriate  Strep pneumonia urine and legionella ordered  Sputum culture ordered  Titrate oxygen as needed  Blood and Urine cultures pending  Change albarran catheter  Daily weight  PT/OT  Social work  Repeat labs in AM     #Pressure ulcer of sacral region, stage 4  #Pressure ulcer left hip, stage 1  Continue wound care regimen  Air mattress  Turn q 2 hours     #Acute on chronic anemia  H&H 10.9/36.4  Continue to monitor     Chronic issues  #Parkinson's  disease  #CKD  #Hypothyroidism  #Atrial fibrillation  #DMII  #HTN  #HLD  #Depression  #MI  #Cholelithiasis  Continue home medications as appropriate when nursing completes home med rec  SSI with hypoglycemic protocol     #DVT prophylaxis  Continue home Xarelto  SCDs     I spent 45 minutes in the professional and overall care of this patient.           Addendum patient seen and evaluated agree with all the above to treat her for a urinary tract infection change in mental status history of Parkinson's repeat the CBC and the electrolytes continue with all above management thank you                     Revision History          This patient has a urinary catheter              Reason for the urinary catheter remaining today?               Principal Problem:    UTI (urinary tract infection)  Active Problems:    Urinary tract infection associated with indwelling urethral catheter, initial encounter (CMS/[facility])              Malnutrition     Patient fully evaluated on February 29.  IV antibiotics have been discontinued will monitor overnight.  Recheck labs in AM.  Remeron added for appetite stimulant  I agree with the dietitian's malnutrition diagnosis.        Patient fully evaluated on February 28.  Urine culture results are noted.  Continue present IV antibiotics for additional 24 hours and recheck labs in a.m.  Patient fully evaluated on March 1.  Still with  and significant oral intake.  Family agreeable to PEG placement.  Continue present IV antibiotics for now     Patient fully evaluated on March 2.  Slight improvement in oral intake.  Patient agreeable to PEG placement now.  More awake and alert after narcotics were de-escalated.     Patient fully evaluated on March 3 and appetite slightly improved.  Continue puréed diet.  Await PEG placement.    Patient fully evaluated on March 4. Appetite still continues to be slightly improved. Patient continues to await PEG placement.     Balaji Fitzgerald MD                        Irina Stratton is a 73 y.o. female on day 10 of admission presenting with UTI (urinary tract infection).      Subjective   Patient clinically unchanged.  PEG placement planned for today.       Objective     Last Recorded Vitals  /81   Pulse 75   Temp 36.1 °C (97 °F)   Resp 16   Wt 53 kg (116 lb 13.5 oz)   SpO2 93%   Intake/Output last 3 Shifts:    Intake/Output Summary (Last 24 hours) at 3/5/2024 1503  Last data filed at 3/5/2024 1132  Gross per 24 hour   Intake 650 ml   Output 725 ml   Net -75 ml       Admission Weight  Weight: 52 kg (114 lb 10.2 oz) (02/24/24 0300)    Daily Weight  03/05/24 : 53 kg (116 lb 13.5 oz)    Image Results  EGD w PEG Tube Placement  Table formatting from the original result was not included.  Impression  Small type I hiatal hernia  The cardia, body of the stomach, greater curve of the stomach, lesser   curve of the stomach, incisura, antrum and prepyloric region appeared   normal.  PEG tube placed  The duodenal bulb, 1st part of the duodenum and 2nd part of the duodenum   appeared normal.    Findings  Small sliding hiatal hernia (type I hiatal hernia) without Favio lesions   present - diaphragmatic impression 37 cm from the incisors, confirmed by   retroflexion. Hill grade I hiatal hernia  The cardia, body of the stomach, greater curve of the stomach, lesser   curve of the stomach, incisura, antrum and prepyloric region appeared   normal.  A [employer] PEG tube measuring 20 Fr was successfully placed using   a deformable internal bolster via the pull technique after the site was   identified via transillumination and visualized indentation; distance from   external bolster to external end of tube: 3 cm  The duodenal bulb, 1st part of the duodenum and 2nd part of the duodenum   appeared normal.    Recommendation   Follow up with PCP     Indication  Dementia and Fistulae    Staff  Staff Role   Reza Deal MD Proceduralist   Lakshmi Medina MD Proceduralist      Medications  No administrations occurring from 1023 to 1126 on 03/05/24     Preprocedure  A history and physical has been performed, and patient medication   allergies have been reviewed. The patient's tolerance of previous   anesthesia has been reviewed. The risks and benefits of the procedure and   the sedation options and risks were discussed with the patient. All   questions were answered and informed consent obtained.    Details of the Procedure  The patient underwent monitored anesthesia care, which was administered by   the procedural nurse and an anesthesia professional. The patient's blood   pressure, ECG, ETCO2, heart rate, level of consciousness, oxygen and   respirations were monitored throughout the procedure. The scope was   introduced through the mouth and advanced to the second part of the   duodenum. Retroflexion was performed in the cardia. Prior to the   procedure, the patient's H. Pylori status was unknown. The patient   experienced no blood loss. The procedure was not difficult. The patient   tolerated the procedure well. There were no apparent adverse events.     Antibiotics administered prior to procedure: yes  Site prep: Hibaclens  Patient Positioning: semi-upright          Events  Procedure Events   Event Event Time   ENDO SCOPE IN TIME 3/5/2024 11:13 AM   ENDO SCOPE OUT TIME 3/5/2024 11:20 AM     Specimens  No specimens collected    Procedure Location  [facility] 9  [address] [facility] [address] [phone]    Referring Provider  No referring provider defined for this encounter.    Procedure Provider  No name on fileImpression  Small type I hiatal hernia  The cardia, body of the stomach, greater curve of the stomach, lesser   curve of the stomach, incisura, antrum and prepyloric region appeared   normal.  PEG tube placed  The duodenal bulb, 1st part of the duodenum and 2nd part of the duodenum   appeared normal.    Findings  Small sliding  hiatal hernia (type I hiatal hernia) without Favio lesions   present - diaphragmatic impression 37 cm from the incisors, confirmed by   retroflexion. Hill grade I hiatal hernia  The cardia, body of the stomach, greater curve of the stomach, lesser   curve of the stomach, incisura, antrum and prepyloric region appeared   normal.  A [employer] PEG tube measuring 20 Fr was successfully placed using   a deformable internal bolster via the pull technique after the site was   identified via transillumination and visualized indentation; distance from   external bolster to external end of tube: 3 cm  The duodenal bulb, 1st part of the duodenum and 2nd part of the duodenum   appeared normal.    Recommendation   Follow up with PCP     Indication  Cachexia (CMS/HCC)    Staff  Staff Role   Reza Deal MD Proceduralist   Lakshmi Medina MD Proceduralist     Medications  See Anesthesia Record.     Preprocedure  A history and physical has been performed, and patient medication   allergies have been reviewed. The patient's tolerance of previous   anesthesia has been reviewed. The risks and benefits of the procedure and   the sedation options and risks were discussed with the patient. All   questions were answered and informed consent obtained.    Details of the Procedure  The patient underwent monitored anesthesia care, which was administered by   the procedural nurse and an anesthesia professional. The patient's blood   pressure, ECG, ETCO2, heart rate, level of consciousness, oxygen and   respirations were monitored throughout the procedure. The scope was   introduced through the mouth and advanced to the second part of the   duodenum. Retroflexion was performed in the cardia. Prior to the   procedure, the patient's H. Pylori status was unknown. The patient   experienced no blood loss. The procedure was not difficult. The patient   tolerated the procedure well. There were no apparent adverse events.      Events  Procedure Events   Event Event Time   ENDO SCOPE IN TIME 3/5/2024 11:13 AM   ENDO SCOPE OUT TIME 3/5/2024 11:20 AM     Specimens  No specimens collected    Procedure Location  [facility] 9  [address] [facility] [address]  [phone]    Referring Provider  No referring provider defined for this encounter.    Procedure Provider  No name on file      Physical Exam    Relevant Results               Assessment/Plan          This patient has a urinary catheter   Reason for the urinary catheter remaining today?       Balaji Fitzgerald MD  Physician  Internal Medicine     Progress Notes     Signed     Date of Service: 3/3/2024  4:26 PM     Signed       Expand All Collapse All    Irina Rodrigues is a 73 y.o. female on day 8 of admission presenting with UTI (urinary tract infection).           Subjective   Patient fully evaluated on February 28 and clinically improved.              Objective   Last Recorded Vitals  /75   Pulse 87   Temp 36.8 °C (98.2 °F)   Resp 18   Wt 53.5 kg (117 lb 15.1 oz)   SpO2 93%   Intake/Output last 3 Shifts:     Intake/Output Summary (Last 24 hours) at 3/3/2024 1626  Last data filed at 3/3/2024 1500      Gross per 24 hour   Intake 990 ml   Output 1450 ml   Net -460 ml            Admission Weight  Weight: 52 kg (114 lb 10.2 oz) (02/24/24 0300)     Daily Weight  03/03/24 : 53.5 kg (117 lb 15.1 oz)     Image Results  Transthoracic Echo (TTE) Complete      [facility], [address] Critical access hospital [id number] [phone] and                                  Fax [phone]     TRANSTHORACIC ECHOCARDIOGRAM REPORT        Patient Name:      IRINA RODRIGUES     Reading Physician:    [id number] Garrett Daley MD  Study Date:        2/25/2024            Ordering Provider:    [id number] PASTOR HEARD  MRN/PID:           [id number]             Fellow:  Accession#:        [id number]          Nurse:  Date of Birth/Age: 1950 / 73 years Sonographer:          Soanm Martinez                                                                RDCS  Gender:            F                    Additional Staff:  Height:            167.00 cm            Admit Date:           2/24/2024  Weight:            56.00 kg             Admission Status:     Inpatient -                                                                Priority discharge  BSA / BMI:         1.62 m2 / 20.08      Encounter#:           [id number]                     kg/m2                                          Department Location:  [facility]  Blood Pressure: 127 /59 mmHg     Study Type:    TRANSTHORACIC ECHO (TTE) COMPLETE  Diagnosis/ICD: Elevated Troponin-R79.89  Indication:    Elevated Troponin  CPT Code:      Echo Complete w Full Doppler-[id number]     Patient History:  Pertinent History: A-Fib, AAA and HTN.     Study Detail: The following Echo studies were performed: 2D, M-Mode, Doppler and                color flow. Technically challenging study due to prominent lung                artifact.        PHYSICIAN INTERPRETATION:  Left Ventricle: The left ventricular systolic function is low normal, with an estimated ejection fraction of 50%. There are no regional wall motion abnormalities. The left ventricular cavity size is normal. Spectral Doppler shows an impaired relaxation pattern of left ventricular diastolic filling.  Left Atrium: The left atrium is upper limits of normal in size.  Right Ventricle: The right ventricle is normal in size. There is normal right ventricular global systolic function.  Right Atrium: The right atrium is normal in size.  Aortic Valve: The aortic valve is trileaflet. There is evidence of mild aortic valve stenosis.  There is no evidence of aortic valve regurgitation. The peak instantaneous gradient of the aortic valve is 11.3 mmHg. The mean gradient of the aortic valve is 6.0 mmHg.  Mitral Valve: The mitral valve  is normal in structure. There is mild mitral valve regurgitation.  Tricuspid Valve: The tricuspid valve is structurally normal. No evidence of tricuspid regurgitation.  Pulmonic Valve: The pulmonic valve is structurally normal. There is no indication of pulmonic valve regurgitation.  Pericardium: There is no pericardial effusion noted.  Aorta: The aortic root is normal.        CONCLUSIONS:   1. Left ventricular systolic function is low normal with a 50% estimated ejection fraction.   2. Spectral Doppler shows an impaired relaxation pattern of left ventricular diastolic filling.   3. Mild aortic valve stenosis.     QUANTITATIVE DATA SUMMARY:  2D MEASUREMENTS:                            Normal Ranges:  IVSd:          1.05 cm    (0.6-1.1cm)  LVPWd:         1.02 cm    (0.6-1.1cm)  LVIDd:         4.78 cm    (3.9-5.9cm)  LVIDs:         3.27 cm  LV Mass Index: 109.0 g/m2  LV % FS        31.6 %     AORTA MEASUREMENTS:                     Normal Ranges:  Asc Ao, d: 3.20 cm (2.1-3.4cm)     LV SYSTOLIC FUNCTION BY 2D PLANIMETRY (MOD):                      Normal Ranges:  EF-A4C View: 52.0 % (>=55%)  EF-A2C View: 45.9 %  EF-Biplane:  49.7 %     LV DIASTOLIC FUNCTION:                         Normal Ranges:  MV Peak E:    0.93 m/s (0.7-1.2 m/s)  MV Peak A:    0.92 m/s (0.42-0.7 m/s)  E/A Ratio:    1.01     (1.0-2.2)  MV lateral e' 0.09 m/s  MV medial e'  0.08 m/s     MITRAL VALVE:                  Normal Ranges:  MV DT: 218 msec (150-240msec)     AORTIC VALVE:                                     Normal Ranges:  AoV Vmax:                1.68 m/s  (<=1.7m/s)  AoV Peak P.3 mmHg (<20mmHg)  AoV Mean P.0 mmHg  (1.7-11.5mmHg)  LVOT Max Oscar:            0.63 m/s  (<=1.1m/s)  AoV VTI:                 41.50 cm  (18-25cm)  LVOT VTI:                15.70 cm  LVOT Diameter:           2.20 cm   (1.8-2.4cm)  AoV Area, VTI:           1.44 cm2  (2.5-5.5cm2)  AoV Area,Vmax:           1.42 cm2  (2.5-4.5cm2)  AoV  Dimensionless Index: 0.38        RIGHT VENTRICLE:  RV Basal 2.39 cm  RV Mid   1.60 cm  RV Major 7.0 cm  TAPSE:   20.5 mm  RV s'    0.16 m/s     TRICUSPID VALVE/RVSP:                              Normal Ranges:  Peak TR Velocity: 2.36 m/s  RV Syst Pressure: 25.3 mmHg (< 30mmHg)     PULMONIC VALVE:                       Normal Ranges:  PV Max Oscar: 0.8 m/s  (0.6-0.9m/s)  PV Max P.3 mmHg  PV Mean P.0 mmHg  PV VTI:     18.50 cm        [id number] Garrett Daley MD  Electronically signed on 2024 at 1:51:45 PM        ** Final **        Physical Exam     Relevant Results                       Assessment/Plan      H&P     Addendum     Date of Service: 2024  5:46 AM      Addendum        Expand All Collapse All    History Of Present Illness  Irina Stratton is a 73 y.o. female with past medical history of Parkinson's, CKD, hypothyroidism, atrial fibrillation on Xarelto, diabetes, hypertension, hyperlipidemia, depression, MI, sacral osteomyelitis, and cholelithiasis, who presented [facility] ED today via EMS from home for lethargy. EMS stated she had a fever en route. Patient has a albarran catheter that was recently changed on Wednesday.  stated patient has been more lethargic than normal. He states she does not communicate much and is non-ambulatory at baseline. Additional history obtained from chart.     ED course: Temp 37.5C, /71, HR 96, RR 20, 95% RA. EKG unavailable for my review. Labs: WBC 12.0, neutrophils 10.04. RBC 3.98, H&H 10.9/36.4.[id number]. Sodium 135. BUN 29. . Troponin 13. UA: hazy, 3+protein, moderate blood, small leukocytes, 1+bacteria, RBC>20, WBC 21-50. Urine and blood cultures collected. See imaging results below. Azithromycin, zosyn, vancomycin started in ED. Patient will be admitted under the care of Dr. Fitzgerald who will continue to follow. I was asked to H&P and place initial admission orders.  Past Medical History  Medical History           Past Medical History:   Diagnosis  Date    Abnormal radiologic findings on diagnostic imaging of right kidney       Abnormal ultrasound of both kidneys    Personal history of other medical treatment       History of echocardiogram            Surgical History  Surgical History             Past Surgical History:   Procedure Laterality Date     SECTION, CLASSIC   2016      Section    CT ABDOMEN PELVIS ANGIOGRAM W AND/OR WO IV CONTRAST   2019     CT ABDOMEN PELVIS ANGIOGRAM W AND/OR WO IV CONTRAST 2019 [facility]    CT AORTA AND BILATERAL ILIOFEMORAL RUNOFF ANGIOGRAM W AND/OR WO IV CONTRAST   2020     CT AORTA AND BILATERAL ILIOFEMORAL RUNOFF ANGIOGRAM W AND/OR WO IV CONTRAST 2020 [facility]    OTHER SURGICAL HISTORY   2020     History of prior surgery    OTHER SURGICAL HISTORY   2020     Coronary artery stent placement    OTHER SURGICAL HISTORY   2020     Abdominal aortic aneurysm repair endovascular    OTHER SURGICAL HISTORY   2020     Cardiac catheterization    OTHER SURGICAL HISTORY   2019     Renal angioplasty and stenting            Social History  She reports that she quit smoking about 13 months ago. Her smoking use included cigarettes. She has never used smokeless tobacco. She reports that she does not currently use alcohol. She reports that she does not use drugs. Lives with . Bedbound.     Family History  Family History               Family History   Problem Relation Name Age of Onset    Stroke Mother                Allergies  Patient has no known allergies.     Review of Systems   Limited ROS due to patient mentation.  Physical Exam  Constitutional:       Appearance: She is ill-appearing.      Comments: Cachetic appearance   HENT:      Head: Normocephalic and atraumatic.      Nose: Nose normal.      Mouth/Throat:      Mouth: Mucous membranes are moist.      Pharynx: Oropharynx is clear.      Comments: Poor dentition  Eyes:      Extraocular  Movements: Extraocular movements intact.      Conjunctiva/sclera: Conjunctivae normal.      Pupils: Pupils are equal, round, and reactive to light.   Cardiovascular:      Rate and Rhythm: Normal rate and regular rhythm.      Pulses: Normal pulses.      Heart sounds: Normal heart sounds.   Pulmonary:      Effort: Pulmonary effort is normal.      Breath sounds: Normal breath sounds.   Abdominal:      General: Abdomen is flat. Bowel sounds are normal.      Palpations: Abdomen is soft.   Genitourinary:     Comments: Rain in place  Musculoskeletal:         General: Normal range of motion.      Cervical back: Normal range of motion and neck supple.   Skin:     General: Skin is warm and dry.      Capillary Refill: Capillary refill takes 2 to 3 seconds.      Comments: Dressing to sacrum   Neurological:      General: No focal deficit present.      Mental Status: She is alert. She is disoriented.      Comments: Answered some questions and followed some commands. Confusion noted.   Psychiatric:         Mood and Affect: Mood normal.            Last Recorded Vitals  Blood pressure 140/70, pulse 98, temperature 37.5 °C (99.5 °F), temperature source Temporal, resp. rate 18, weight 52 kg (114 lb 10.2 oz), SpO2 94 %.     Relevant Results            Results for orders placed or performed during the hospital encounter of 02/23/24 (from the past 24 hour(s))   CBC and Auto Differential   Result Value Ref Range     WBC 12.0 (H) 4.4 - 11.3 x10*3/uL     nRBC 0.0 0.0 - 0.0 /100 WBCs     RBC 3.93 (L) 4.00 - 5.20 x10*6/uL     Hemoglobin 10.9 (L) 12.0 - 16.0 g/dL     Hematocrit 36.4 36.0 - 46.0 %     MCV 93 80 - 100 fL     MCH 27.7 26.0 - 34.0 pg     MCHC 29.9 (L) 32.0 - 36.0 g/dL     RDW 19.6 (H) 11.5 - 14.5 %     Platelets 232 150 - 450 x10*3/uL     Neutrophils % 83.7 40.0 - 80.0 %     Immature Granulocytes %, Automated 0.5 0.0 - 0.9 %     Lymphocytes % 7.8 13.0 - 44.0 %     Monocytes % 7.4 2.0 - 10.0 %     Eosinophils % 0.3 0.0 - 6.0 %      Basophils % 0.3 0.0 - 2.0 %     Neutrophils Absolute 10.04 (H) 1.60 - 5.50 x10*3/uL     Immature Granulocytes Absolute, Automated 0.06 0.00 - 0.50 x10*3/uL     Lymphocytes Absolute 0.94 0.80 - 3.00 x10*3/uL     Monocytes Absolute 0.89 (H) 0.05 - 0.80 x10*3/uL     Eosinophils Absolute 0.04 0.00 - 0.40 x10*3/uL     Basophils Absolute 0.04 0.00 - 0.10 x10*3/uL   Comprehensive Metabolic Panel   Result Value Ref Range     Glucose 166 (H) 74 - 99 mg/dL     Sodium 135 (L) 136 - 145 mmol/L     Potassium 4.1 3.5 - 5.3 mmol/L     Chloride 100 98 - 107 mmol/L     Bicarbonate 27 21 - 32 mmol/L     Anion Gap 12 10 - 20 mmol/L     Urea Nitrogen 29 (H) 6 - 23 mg/dL     Creatinine 0.80 0.50 - 1.05 mg/dL     eGFR 78 >60 mL/min/1.73m*2     Calcium 9.1 8.6 - 10.3 mg/dL     Albumin 3.4 3.4 - 5.0 g/dL     Alkaline Phosphatase 102 33 - 136 U/L     Total Protein 8.4 (H) 6.4 - 8.2 g/dL     AST 27 9 - 39 U/L     Bilirubin, Total 0.7 0.0 - 1.2 mg/dL     ALT 18 7 - 45 U/L   Lactate   Result Value Ref Range     Lactate 1.9 0.4 - 2.0 mmol/L   B-Type Natriuretic Peptide   Result Value Ref Range      (H) 0 - 99 pg/mL   Troponin I, High Sensitivity   Result Value Ref Range     Troponin I, High Sensitivity 13 0 - 13 ng/L   Urinalysis with Reflex Culture and Microscopic   Result Value Ref Range     Color, Urine Yellow Straw, Yellow     Appearance, Urine Hazy (N) Clear     Specific Gravity, Urine 1.017 1.005 - 1.035     pH, Urine 8.0 5.0, 5.5, 6.0, 6.5, 7.0, 7.5, 8.0     Protein, Urine >=500 (3+) (N) NEGATIVE mg/dL     Glucose, Urine NEGATIVE NEGATIVE mg/dL     Blood, Urine MODERATE (2+) (A) NEGATIVE     Ketones, Urine NEGATIVE NEGATIVE mg/dL     Bilirubin, Urine NEGATIVE NEGATIVE     Urobilinogen, Urine <2.0 <2.0 mg/dL     Nitrite, Urine NEGATIVE NEGATIVE     Leukocyte Esterase, Urine SMALL (1+) (A) NEGATIVE   Microscopic Only, Urine   Result Value Ref Range     WBC, Urine 21-50 (A) 1-5, NONE /HPF     RBC, Urine >20 (A) NONE, 1-2, 3-5 /HPF      Bacteria, Urine 1+ (A) NONE SEEN /HPF      XR chest 1 view     Result Date: 2/24/2024  STUDY: Chest Radiograph;  02/23/2024 11:02 PM INDICATION: Sepsis, evaluate for source.  COMPARISON: XR chest 12/04/2022.  CT chest/abdomen/pelvis 06/01/2023.  ACCESSION NUMBER(S): [id number] ORDERING CLINICIAN: KENNA ALCALA TECHNIQUE:  Frontal chest was obtained at 2302 hours. FINDINGS: CARDIOMEDIASTINAL SILHOUETTE: Cardiomediastinal silhouette is enlarged in size and configuration. Calcified plaque is seen in the aorta.  LUNGS: Increased interstitial markings are seen bilaterally with subtle patchy right basilar airspace opacity.  ABDOMEN: No remarkable upper abdominal findings.  BONES: No acute osseous changes.  Generalized osseous demineralization is noted.     Cardiomegaly with increased interstitial markings and subtle patchy right infrahilar airspace opacity.  Mild pulmonary edema secondary to CHF is favored although right lower lobe pneumonia is a possibility in the appropriate clinical setting. Signed by Salvatore Parada            Assessment/Plan   Principal Problem:    UTI (urinary tract infection)      73-year-old female with past medical history of Parkinson's, CKD, hypothyroidism, atrial fibrillation on Xarelto, diabetes, hypertension, hyperlipidemia, depression, MI, sacral osteomyelitis, and cholelithiasis, who presented to [facility] ED today via EMS from home for lethargy. EMS stated she had a fever en route. Patient has a albarran catheter that was recently changed on Wednesday.  Continue antibiotics. Patient to be hospitalized for further medical management.      #Suspect sepsis likely 2/2 to pneumonia vs UTI  #Suspect pneumonia RLL  #Suspected UTI likely 2/2 chronic indwelling albarran catheter  #Elevated BNP  #Acute metabolic encephalopathy likely 2/2 suspected UTI/ pneumonia   #Leukocytosis  Admit to OBS/telemetry to Dr. Fitzgerald  Defer consults to attending per request  See imaging results above  Echocardiogram  ordered  Continue Azithromycin, Zosyn and vancomycin. De-escalate as appropriate  Strep pneumonia urine and legionella ordered  Sputum culture ordered  Titrate oxygen as needed  Blood and Urine cultures pending  Change albarran catheter  Daily weight  PT/OT  Social work  Repeat labs in AM     #Pressure ulcer of sacral region, stage 4  #Pressure ulcer left hip, stage 1  Continue wound care regimen  Air mattress  Turn q 2 hours     #Acute on chronic anemia  H&H 10.9/36.4  Continue to monitor     Chronic issues  #Parkinson's disease  #CKD  #Hypothyroidism  #Atrial fibrillation  #DMII  #HTN  #HLD  #Depression  #MI  #Cholelithiasis  Continue home medications as appropriate when nursing completes home med rec  SSI with hypoglycemic protocol     #DVT prophylaxis  Continue home Xarelto  SCDs     I spent 45 minutes in the professional and overall care of this patient.           Addendum patient seen and evaluated agree with all the above to treat her for a urinary tract infection change in mental status history of Parkinson's repeat the CBC and the electrolytes continue with all above management thank you                     Revision History          This patient has a urinary catheter              Reason for the urinary catheter remaining today?               Principal Problem:    UTI (urinary tract infection)  Active Problems:    Urinary tract infection associated with indwelling urethral catheter, initial encounter (CMS/[facility])              Malnutrition     Patient fully evaluated on February 29.  IV antibiotics have been discontinued will monitor overnight.  Recheck labs in AM.  Remeron added for appetite stimulant  I agree with the dietitian's malnutrition diagnosis.        Patient fully evaluated on February 28.  Urine culture results are noted.  Continue present IV antibiotics for additional 24 hours and recheck labs in a.m.  Patient fully evaluated on March 1.  Still with  and significant oral intake.  Family agreeable to  PEG placement.  Continue present IV antibiotics for now     Patient fully evaluated on March 2.  Slight improvement in oral intake.  Patient agreeable to PEG placement now.  More awake and alert after narcotics were de-escalated.     Patient fully evaluated on March 3 and appetite slightly improved.  Continue puréed diet.  Await PEG placement.    Patient fully evaluated on March 4. Appetite still continues to be slightly improved. Patient continues to await PEG placement.   Patient fully evaluated on March 5.  PEG placement today.  Tube feeds to be initiated.  Continue diet and advance as tolerated.  Recheck labs in AM.  Balaji Fitzgerald MD                                 Principal Problem:    UTI (urinary tract infection)  Active Problems:    Urinary tract infection associated with indwelling urethral catheter, initial encounter (CMS/[facility])          Malnutrition          I agree with the dietitian's malnutrition diagnosis.     Patient fully evaluated and will require PEG feeding tube for severe protein calorie malnutrition with significant dysphagia.  Patient's main source of nutrition will be tube feeds with a pleasure diet as tolerated    Balaji Fitzgerald MD

## 2024-03-05 NOTE — PROGRESS NOTES
"Irina Stratton is a 73 y.o. female on day 10 of admission presenting with UTI (urinary tract infection).    Subjective   Patient had no specific complaints extremely thin him as he aged.  No tachycardia abdominal exam soft no masses       Objective     Physical Exam she was alert but has.  The confusion.  No tachycardia noted regular rhythm abdominal exam shows soft no masses are noted    Last Recorded Vitals  Blood pressure 133/71, pulse 72, temperature 36.4 °C (97.5 °F), temperature source Temporal, resp. rate 18, height 1.676 m (5' 5.98\"), weight 53 kg (116 lb 13.5 oz), SpO2 96 %.  Intake/Output last 3 Shifts:  I/O last 3 completed shifts:  In: 500 (9.4 mL/kg) [IV Piggyback:500]  Out: 1925 (36.3 mL/kg) [Urine:1925 (1 mL/kg/hr)]  Weight: 53 kg     Relevant Results      EGD w PEG Tube Placement    Result Date: 3/5/2024  Table formatting from the original result was not included. Impression Small type I hiatal hernia The cardia, body of the stomach, greater curve of the stomach, lesser curve of the stomach, incisura, antrum and prepyloric region appeared normal. PEG tube placed The duodenal bulb, 1st part of the duodenum and 2nd part of the duodenum appeared normal. Findings Small sliding hiatal hernia (type I hiatal hernia) without Favio lesions present - diaphragmatic impression 37 cm from the incisors, confirmed by retroflexion. Hill grade I hiatal hernia The cardia, body of the stomach, greater curve of the stomach, lesser curve of the stomach, incisura, antrum and prepyloric region appeared normal. A [employer] PEG tube measuring 20 Fr was successfully placed using a deformable internal bolster via the pull technique after the site was identified via transillumination and visualized indentation; distance from external bolster to external end of tube: 3 cm The duodenal bulb, 1st part of the duodenum and 2nd part of the duodenum appeared normal. Recommendation  Follow up with PCP Indication Dementia " and Fistulae Staff Staff Role Reza Deal MD Proceduralist Lakshmi Medina MD Proceduralist Medications No administrations occurring from 1023 to 1126 on 03/05/24 Preprocedure A history and physical has been performed, and patient medication allergies have been reviewed. The patient's tolerance of previous anesthesia has been reviewed. The risks and benefits of the procedure and the sedation options and risks were discussed with the patient. All questions were answered and informed consent obtained. Details of the Procedure The patient underwent monitored anesthesia care, which was administered by the procedural nurse and an anesthesia professional. The patient's blood pressure, ECG, ETCO2, heart rate, level of consciousness, oxygen and respirations were monitored throughout the procedure. The scope was introduced through the mouth and advanced to the second part of the duodenum. Retroflexion was performed in the cardia. Prior to the procedure, the patient's H. Pylori status was unknown. The patient experienced no blood loss. The procedure was not difficult. The patient tolerated the procedure well. There were no apparent adverse events. Antibiotics administered prior to procedure: yes Site prep: Hibaclens Patient Positioning: semi-upright   Events Procedure Events Event Event Time ENDO SCOPE IN TIME 3/5/2024 11:13 AM ENDO SCOPE OUT TIME 3/5/2024 11:20 AM Specimens No specimens collected Procedure Location [facility] [address] [facility] [id number] [phone] Referring Provider No referring provider defined for this encounter. Procedure Provider No name on fileImpression Small type I hiatal hernia The cardia, body of the stomach, greater curve of the stomach, lesser curve of the stomach, incisura, antrum and prepyloric region appeared normal. PEG tube placed The duodenal bulb, 1st part of the duodenum and 2nd part of the duodenum appeared normal. Findings Small sliding  hiatal hernia (type I hiatal hernia) without Favio lesions present - diaphragmatic impression 37 cm from the incisors, confirmed by retroflexion. Hill grade I hiatal hernia The cardia, body of the stomach, greater curve of the stomach, lesser curve of the stomach, incisura, antrum and prepyloric region appeared normal. A [employer] PEG tube measuring 20 Fr was successfully placed using a deformable internal bolster via the pull technique after the site was identified via transillumination and visualized indentation; distance from external bolster to external end of tube: 3 cm The duodenal bulb, 1st part of the duodenum and 2nd part of the duodenum appeared normal. Recommendation  Follow up with PCP Indication Cachexia (CMS/HCC) Staff Staff Role Reza Deal MD Proceduralist Lakshmi Medina MD Proceduralist Medications See Anesthesia Record. Preprocedure A history and physical has been performed, and patient medication allergies have been reviewed. The patient's tolerance of previous anesthesia has been reviewed. The risks and benefits of the procedure and the sedation options and risks were discussed with the patient. All questions were answered and informed consent obtained. Details of the Procedure The patient underwent monitored anesthesia care, which was administered by the procedural nurse and an anesthesia professional. The patient's blood pressure, ECG, ETCO2, heart rate, level of consciousness, oxygen and respirations were monitored throughout the procedure. The scope was introduced through the mouth and advanced to the second part of the duodenum. Retroflexion was performed in the cardia. Prior to the procedure, the patient's H. Pylori status was unknown. The patient experienced no blood loss. The procedure was not difficult. The patient tolerated the procedure well. There were no apparent adverse events. Events Procedure Events Event Event Time ENDO SCOPE IN TIME 3/5/2024 11:13 AM ENDO SCOPE  OUT TIME 3/5/2024 11:20 AM Specimens No specimens collected Procedure Location [facility] [address] [facility] [address] [phone] Referring Provider No referring provider defined for this encounter. Procedure Provider No name on file     Transthoracic Echo (TTE) Complete    Result Date: 2/25/2024    [facility], [address] [facility] [id number] [phone] and                                 Fax [phone] TRANSTHORACIC ECHOCARDIOGRAM REPORT  Patient Name:      ANTHONY Wong Physician:    [id number] Garrett Daley MD Study Date:        2/25/2024            Ordering Provider:    [id number] PASTOR HEARD MRN/PID:           [id number]             Fellow: Accession#:        [id number]         Nurse: Date of Birth/Age: 1950 / 73 years Sonographer:          Sonam Martinez RDCS Gender:            F                    Additional Staff: Height:            167.00 cm            Admit Date:           2/24/2024 Weight:            56.00 kg             Admission Status:     Inpatient -                                                               Priority discharge BSA / BMI:         1.62 m2 / 20.08      Encounter#:           [id number]                    kg/m2                                         Department Location:  [facility] Blood Pressure: 127 /59 mmHg Study Type:    TRANSTHORACIC ECHO (TTE) COMPLETE Diagnosis/ICD: Elevated Troponin-R79.89 Indication:    Elevated Troponin CPT Code:      Echo Complete w Full [id number] Patient History: Pertinent History: A-Fib, AAA and HTN. Study Detail: The following Echo studies were performed: 2D, M-Mode, Doppler and               color flow. Technically challenging study due to prominent lung               artifact.  PHYSICIAN INTERPRETATION: Left Ventricle: The left ventricular  systolic function is low normal, with an estimated ejection fraction of 50%. There are no regional wall motion abnormalities. The left ventricular cavity size is normal. Spectral Doppler shows an impaired relaxation pattern of left ventricular diastolic filling. Left Atrium: The left atrium is upper limits of normal in size. Right Ventricle: The right ventricle is normal in size. There is normal right ventricular global systolic function. Right Atrium: The right atrium is normal in size. Aortic Valve: The aortic valve is trileaflet. There is evidence of mild aortic valve stenosis. There is no evidence of aortic valve regurgitation. The peak instantaneous gradient of the aortic valve is 11.3 mmHg. The mean gradient of the aortic valve is 6.0 mmHg. Mitral Valve: The mitral valve is normal in structure. There is mild mitral valve regurgitation. Tricuspid Valve: The tricuspid valve is structurally normal. No evidence of tricuspid regurgitation. Pulmonic Valve: The pulmonic valve is structurally normal. There is no indication of pulmonic valve regurgitation. Pericardium: There is no pericardial effusion noted. Aorta: The aortic root is normal.  CONCLUSIONS:  1. Left ventricular systolic function is low normal with a 50% estimated ejection fraction.  2. Spectral Doppler shows an impaired relaxation pattern of left ventricular diastolic filling.  3. Mild aortic valve stenosis. QUANTITATIVE DATA SUMMARY: 2D MEASUREMENTS:                           Normal Ranges: IVSd:          1.05 cm    (0.6-1.1cm) LVPWd:         1.02 cm    (0.6-1.1cm) LVIDd:         4.78 cm    (3.9-5.9cm) LVIDs:         3.27 cm LV Mass Index: 109.0 g/m2 LV % FS        31.6 % AORTA MEASUREMENTS:                    Normal Ranges: Asc Ao, d: 3.20 cm (2.1-3.4cm) LV SYSTOLIC FUNCTION BY 2D PLANIMETRY (MOD):                     Normal Ranges: EF-A4C View: 52.0 % (>=55%) EF-A2C View: 45.9 % EF-Biplane:  49.7 % LV DIASTOLIC FUNCTION:                        Normal  Ranges: MV Peak E:    0.93 m/s (0.7-1.2 m/s) MV Peak A:    0.92 m/s (0.42-0.7 m/s) E/A Ratio:    1.01     (1.0-2.2) MV lateral e' 0.09 m/s MV medial e'  0.08 m/s MITRAL VALVE:                 Normal Ranges: MV DT: 218 msec (150-240msec) AORTIC VALVE:                                    Normal Ranges: AoV Vmax:                1.68 m/s  (<=1.7m/s) AoV Peak P.3 mmHg (<20mmHg) AoV Mean P.0 mmHg  (1.7-11.5mmHg) LVOT Max Oscar:            0.63 m/s  (<=1.1m/s) AoV VTI:                 41.50 cm  (18-25cm) LVOT VTI:                15.70 cm LVOT Diameter:           2.20 cm   (1.8-2.4cm) AoV Area, VTI:           1.44 cm2  (2.5-5.5cm2) AoV Area,Vmax:           1.42 cm2  (2.5-4.5cm2) AoV Dimensionless Index: 0.38  RIGHT VENTRICLE: RV Basal 2.39 cm RV Mid   1.60 cm RV Major 7.0 cm TAPSE:   20.5 mm RV s'    0.16 m/s TRICUSPID VALVE/RVSP:                             Normal Ranges: Peak TR Velocity: 2.36 m/s RV Syst Pressure: 25.3 mmHg (< 30mmHg) PULMONIC VALVE:                      Normal Ranges: PV Max Oscar: 0.8 m/s  (0.6-0.9m/s) PV Max P.3 mmHg PV Mean P.0 mmHg PV VTI:     18.50 cm  [id number] Garrett Daley MD Electronically signed on 2024 at 1:51:45 PM  ** Final **     XR chest 1 view    Result Date: 2024  STUDY: Chest Radiograph;  2024 11:02 PM INDICATION: Sepsis, evaluate for source.  COMPARISON: XR chest 2022.  CT chest/abdomen/pelvis 2023.  ACCESSION NUMBER(S): [id number] ORDERING CLINICIAN: KENNA ALCALA TECHNIQUE:  Frontal chest was obtained at 2302 hours. FINDINGS: CARDIOMEDIASTINAL SILHOUETTE: Cardiomediastinal silhouette is enlarged in size and configuration. Calcified plaque is seen in the aorta.  LUNGS: Increased interstitial markings are seen bilaterally with subtle patchy right basilar airspace opacity.  ABDOMEN: No remarkable upper abdominal findings.  BONES: No acute osseous changes.  Generalized osseous demineralization is noted.    Cardiomegaly with  increased interstitial markings and subtle patchy right infrahilar airspace opacity.  Mild pulmonary edema secondary to CHF is favored although right lower lobe pneumonia is a possibility in the appropriate clinical setting. Signed by Salvatore Parada    No specimens collected during this procedure.  Last relevant procedure: EGD on 3/5/24            This patient has a urinary catheter   Reason for the urinary catheter remaining today?                 Assessment/Plan   Active Problems:  There are no active Hospital Problems.    I went ahead and did the EGD on this patient.  Surprisingly she had a lot of thick yellowish material in the oropharynx and esophagus which was suctioned out clearly.  The esophagus showed a small hiatal hernia the stomach and duodenum were normal regular PEG tube was placed 20 Albanian size successfully  .  1 may resume tube feeding in about 8 hours       I spent 20 minutes in the professional and overall care of this patient.      Lakshmi Medina MD

## 2024-03-05 NOTE — PROGRESS NOTES
"Nutrition Follow Up Assessment:   Nutrition Assessment         Patient is a 73 y.o. female presenting with UTI. Pt out of room for PEG tube placement at time of attempted follow up. Coordinated with DWAYNE Talamantes, regarding nutrition needs.       Nutrition History:  Energy Intake: Poor < 50 %  Food and Nutrient History: Pt with variable intakes this admission, ranging from 0-100%, average intake of 53% over 10 meals documented. Pt getting PEG tube placed today for supplemental nutrition as intake has been chronically inconsistent and less than optimal. Unable to assess for GI symptoms. Per RN and review of GI note, ok to start TF ~8 hours post PEG tube placement. Per RN, pt can continue oral diet while receiving supplemental TF.  Food Allergies/Intolerances:  None  GI Symptoms:  ANA  Oral Problems:  ANA       Anthropometrics:  Height: 167.6 cm (5' 5.98\")   Weight: 53 kg (116 lb 13.5 oz)   BMI (Calculated): 18.87  IBW/kg (Dietitian Calculated): 59.1 kg  Percent of IBW: 91 %       Weight History:     Weight Change %:  Weight History / % Weight Change: New wt 53 kg on 3/5 - wt has flcutuated between 51-56 kg this admission.    Nutrition Focused Physical Exam Findings:  defer: pt unavailable  Subcutaneous Fat Loss:   Orbital Fat Pads: Defer  Muscle Wasting:  Temporalis: Defer  Edema:  Edema: +2 mild  Edema Location: BLE per nursing assessment  Physical Findings:  Skin: Positive (Left hip PI, sacrum PI per nursing assessment)    Nutrition Significant Labs:    Reviewed   Nutrition Specific Medications:  Reviewed     I/O:   Last BM Date: 02/29/24; Stool Appearance: Soft (03/02/24 1900)    Dietary Orders (From admission, onward)       Start     Ordered    03/05/24 2100  Enteral feeding WITH diet order Diet type: Carb Controlled; Carb diet selection: 60 gram carb/meal, 30 gram Carb evening snack; Texture: Bite size food 6; 45 (Start at 10 ml/hr and increase by 10 ml every 4 hours as tolerated to goal rate of 45 ml...  " Continuous        Comments: TF to start 8 hours after PEG placement per GI team.   Question Answer Comment   Diet type Carb Controlled    Carb diet selection: 60 gram carb/meal, 30 gram Carb evening snack    Texture Bite size food 6    Feeding route: PEG (percutaneous endoscopic gastric)    Tube feeding continuous rate (mL/hr): 45 Start at 10 ml/hr and increase by 10 ml every 4 hours as tolerated to goal rate of 45 ml/hr. Hold 1 hour before and one hour after Synthroid dose.   Tube feeding flush (mL): 100    Flush frequency: Every 4 hours    Tube feeding formula: Jevity 1.5        03/05/24 1310    03/05/24 0001  NPO Diet; Effective midnight  Diet effective midnight         03/04/24 1527    02/26/24 0925  Oral nutritional supplements  Until discontinued        Question Answer Comment   Deliver with Dinner    Deliver with Lunch    Select supplement: Elieser        02/26/24 0925    02/26/24 0925  Oral nutritional supplements  Until discontinued        Comments: chocolate   Question Answer Comment   Deliver with All meals    Select supplement: Glucerna Shake        02/26/24 0925                     Estimated Needs:      Method for Estimating Needs: 1600-1800kcals (27-30kcals/kg IBW)     Method for Estimating Needs: 82-106g (1.4-1.8g/kg IBW)     Method for Estimating Needs: 1 mL/kcal or as per MD        Nutrition Diagnosis   Malnutrition Diagnosis  Patient has Malnutrition Diagnosis: No    Nutrition Diagnosis  Patient has Nutrition Diagnosis: Yes  Diagnosis Status (1): Ongoing  Nutrition Diagnosis 1: Predicted inadequate energy intake  Related to (1): acute on chronic illness  As Evidenced by (1): MST reports decreased po intakes, altered mental status  Additional Nutrition Diagnosis: Diagnosis 2  Diagnosis Status (2): Ongoing  Nutrition Diagnosis 2: Increased nutrient needs  Related to (2): physiological causes increasing nutrient needs  As Evidenced by (2): wound healing needs       Nutrition  Interventions/Recommendations         Nutrition Prescription:  Individualized Nutrition Prescription Provided for : 60 g CHO/meal carbohdyrate controlled diet with bite size texture & ONS. Supplemental enteral nutrition via PEG.        Nutrition Interventions:   Interventions: Enteral intake, Meals and snacks, Medical food supplement  Meals and Snacks: Carbohydrate-modified diet, Texture-modified diet  Goal: Consumes 3 meals/day  Enteral Intake: Modify composition of enteral nutrition, Modify rate of enteral nutrition, Modify schedule of enteral nutrition, Feeding tube flush  Goal: Start TF Jevity 1.5 at goal rate of 45 ml/hr via PEG x22 hours/day, hold 1 hour before and 1 hour after Synthroid (provides 1485 kcal, 63 g protein, and 752 ml free H2O). 100 ml flushes q4h or per MD.  Medical Food Supplement: Commercial beverage  Goal: consume >75% of Elieser twice daily (for an additional 90 kcals, 2.5 gm protein, supplement glutamine and arginine) and Glucerna shakes three times daily (for an additional 220kcals, 10gm protein each)  Additional Interventions: TF will meet >75% of estimated needs. Rate/formula to be adjusted based on continued evaluation of oral intake/consumption of ONS.    Collaboration and Referral of Nutrition Care: Collaboration by nutrition professional with other providers  Goal: Secure chat sent to Dr. Fitzgerald, Dr. Medina, and DWAYNE Talamantes, requesting consult for either intiation and management of TF or enteral nutrition recommendations. Also discussed nutrition needs with DWAYNE Talamantes, in person.    Nutrition Education:   N/A       Nutrition Monitoring and Evaluation   Food/Nutrient Related History Monitoring  Monitoring and Evaluation Plan: Enteral and parenteral nutrition intake, Energy intake, Fluid intake, Amount of food  Energy Intake: Estimated energy intake  Criteria: Meets >75% of estimated energy needs  Fluid Intake: Estimated fluid intake  Criteria: Meets >75% of estimated fluid  needs  Amount of Food: Estimated amout of food, Medical food intake  Criteria: Pt to consume >50% of meals/ONS  Enteral and Parenteral Nutrition Intake: Enteral nutrition intake  Criteria: Tolerates tube feeds at goal rate    Body Composition/Growth/Weight History  Monitoring and Evaluation Plan: Weight  Weight: Measured weight  Criteria: Maintain stable wt/reduce wt from edema    Biochemical Data, Medical Tests and Procedures  Monitoring and Evaluation Plan: Glucose/endocrine profile  Glucose/Endocrine Profile: Glucose, casual  Criteria: BG within desirable range    Nutrition Focused Physical Findings  Monitoring and Evaluation Plan: Skin  Skin: Impaired wound healing  Criteria: Promote wound healing through adequate nutrition       Time Spent/Follow-up Reminder:   Time Spent (min): 45 minutes  Last Date of Nutrition Visit: 03/05/24  Nutrition Follow-Up Needed?: 3-5 days  Follow up Comment: 3/8 KATIE/ANALI

## 2024-03-05 NOTE — ANESTHESIA PREPROCEDURE EVALUATION
Patient: Irina Stratton    Procedure Information       Date/Time: 03/05/24 1100    Scheduled providers: Lakshmi Medina MD; Flako Skinner MD    Procedure: EGD    Location: [facility]            Relevant Problems   Anesthesia (within normal limits)      Cardiovascular   (+) AAA (abdominal aortic aneurysm) (CMS/[facility])   (+) Benign secondary hypertension due to renal artery stenosis (CMS/[facility])   (+) Bilateral carotid artery stenosis   (+) Carotid stenosis   (+) Coronary atherosclerosis   (+) Hyperlipidemia   (+) Hypertension   (+) Left bundle-branch block, unspecified   (+) Occlusion and stenosis of unspecified carotid artery   (+) Old myocardial infarction   (+) PAD (peripheral artery disease) (CMS/[facility])   (+) Paroxysmal atrial fibrillation (CMS/[facility])   (+) Renal artery stenosis (CMS/[facility])   (+) Saccular aneurysm      Endocrine   (+) DM (diabetes mellitus), type 1 (CMS/[facility])   (+) Hypothyroidism      /Renal   (+) Chronic kidney disease, stage 4 (severe) (CMS/HCC)   (+) Cyst of kidney, acquired   (+) UTI (urinary tract infection)   (+) Urinary tract infection associated with indwelling urethral catheter, initial encounter (CMS/[facility])      Neuro/Psych   (+) Bilateral carotid artery stenosis   (+) Carotid stenosis   (+) Depression, unspecified   (+) Lumbar radiculopathy   (+) Occlusion and stenosis of unspecified carotid artery   (+) Saccular aneurysm      Pulmonary   (+) Emphysema, unspecified (CMS/[facility])      GI/Hepatic   (+) Other cholelithiasis without obstruction      Hematology   (+) Anemia, unspecified      Musculoskeletal   (+) Facet degeneration of lumbar region   (+) Lumbar herniated disc   (+) Lumbar stenosis with neurogenic claudication   (+) Unspecified osteoarthritis, unspecified site      Infectious Disease   (+) Sacral osteomyelitis (CMS/[facility])   (+) UTI (urinary tract infection)   (+) Urinary tract infection associated with indwelling urethral catheter, initial encounter (CMS/HCC)      Other    (+) Sacral osteomyelitis (CMS/HCC)   (+) Sacroiliitis (CMS/HCC)       Clinical information reviewed:    Allergies  Meds               NPO Detail:  NPO/Void Status  Carbohydrate Drink Given Prior to Surgery? : N  Date of Last Liquid: 03/04/24  Time of Last Liquid: 2300  Date of Last Solid: 03/04/24  Time of Last Solid: 2000  Last Intake Type: Light meal         Physical Exam    Airway  Mallampati: II  TM distance: >3 FB  Neck ROM: full     Cardiovascular   Rhythm: regular     Dental   (+) upper dentures     Pulmonary    Abdominal        Anesthesia Plan    History of general anesthesia?: yes  History of complications of general anesthesia?: no    ASA 3     MAC     The patient is not a current smoker.  Patient was not previously instructed to abstain from smoking on day of procedure.  Patient did not smoke on day of procedure.    intravenous induction   Anesthetic plan and risks discussed with patient.  Use of blood products discussed with patient who.    Plan discussed with [facility]AA.

## 2024-03-05 NOTE — PROGRESS NOTES
Met with pt's spouse, plan is to go home with [facility] and new tube feedings via PEG .   Faxed clinicals to [facility] [place] for tube feeding supplies and equipment.  Tentative discharge within 24-48 hrs.   Mely Nair RN TCC

## 2024-03-05 NOTE — CONSULTS
"Reason For Consult  PEG tube placement    History Of Present Illness  Irina Stratton is a 73 y.o. female presenting with lethargy and dysphagia.  She has a significant past history of Parkinson's disease chronic kidney disease hyperlipidemia depression myocardial infarction with aortic biiliac stent placement for infra aortic aneurysm.  Normally the patient was fed with Ensure.  Her history is otherwise reviewed and only obtainable from chart..     Past Medical History  She has a past medical history of Abnormal radiologic findings on diagnostic imaging of right kidney and Personal history of other medical treatment.    Surgical History  She has a past surgical history that includes  section, classic (2016); Other surgical history (2020); Other surgical history (2020); Other surgical history (2020); Other surgical history (2020); Other surgical history (2019); CT angio abdomen pelvis w and or wo IV IV contrast (2019); and CT angio aorta and bilateral iliofemoral runoff w and or wo IV contrast (2020).     Social History  She reports that she quit smoking about 14 months ago. Her smoking use included cigarettes. She has never used smokeless tobacco. She reports that she does not currently use alcohol. She reports that she does not use drugs.    Family History  Family History   Problem Relation Name Age of Onset    Stroke Mother          Allergies  Vancomycin    Review of Systems  Review of systems is unobtainable.     Physical Exam  Patient appears older than stated age  Obvious temporal muscle wasting noted  Respiratory effort symmetric without accessory muscle use  Abdomen nondistended soft without surgical scars  Extremities without edema     Last Recorded Vitals  Blood pressure 169/81, pulse 72, temperature 36.1 °C (97 °F), resp. rate 16, height 1.676 m (5' 5.98\"), weight 53 kg (116 lb 13.5 oz), SpO2 96 %.    Relevant Results       Assessment/Plan     Given " underlying dysphagia in conjunction with Parkinson's disease PEG tube has been recommended and  agrees.    I spent 10 minutes in the professional and overall care of this patient.      Reza Deal MD

## 2024-03-05 NOTE — ANESTHESIA POSTPROCEDURE EVALUATION
Patient: Irina Stratton    Procedure Summary       Date: 03/05/24 Room / Location: [facility]    Anesthesia Start: 1107 Anesthesia Stop: 1133    Procedure: EGD Diagnosis: Cachexia (CMS/HCC)    Scheduled Providers: Lakshmi Medina MD; Flako Skinner MD Responsible Provider: Flako Skinner MD    Anesthesia Type: MAC ASA Status: 3            Anesthesia Type: MAC    Vitals Value Taken Time   /81 03/05/24 1200   Temp 36.3 °C (97.3 °F) 03/05/24 1132   Pulse 74 03/05/24 1200   Resp 16 03/05/24 1200   SpO2 97 % 03/05/24 1200       Anesthesia Post Evaluation    Patient location during evaluation: PACU  Patient participation: complete - patient cannot participate  Level of consciousness: awake  Pain score: 0  Pain management: adequate  Airway patency: patent  Cardiovascular status: acceptable  Respiratory status: acceptable  Hydration status: acceptable  Postoperative Nausea and Vomiting: none    No notable events documented.     Azithromycin Pregnancy And Lactation Text: This medication is considered safe during pregnancy and is also secreted in breast milk.

## 2024-03-05 NOTE — OP NOTE
* Surgery not found * Operative Note     Date:   OR Location: * No surgery found *    Name: Irina Stratton, : 1950, Age: 73 y.o., MRN: [id number], Sex: female    Diagnosis  dysphagia * No surgery found *     Procedures  Insertion PEG    Surgeons   MERCED Deal MD    Resident/Fellow/Other Assistant:  Lakshmi Medina MD    Procedure Summary  Anesthesia:MAC ASA: ASA status cannot be found on the log.  Anesthesia Staff: Anesthesia staff cannot be found from this context.  Estimated Blood Loss: minimalmL  Intra-op Medications: * Intraprocedure medication information is unavailable because the case start and end events have not been set *           Anesthesia Record               Intraprocedure I/O Totals          Intake    LR bolus 150.00 mL    Propofol Drip 0.00 mL    The total shown is the total volume documented since Anesthesia Start was filed.    Total Intake 150 mL          Specimen: * Cannot find log *     Staff:   * Surgery not found *         Drains and/or Catheters:   Gastrostomy/Enterostomy Percutaneous endoscopic gastrostomy (PEG) 20 Fr. (Active)   Dressing Status Clean;Dry 24 1140       Urethral Catheter (Active)   Site Assessment Clean;Skin intact 24 1023   Collection Container Standard drainage bag 24 1140   Securement Method Securing device (Describe) 24 0936   Reason for Continuing Urinary Catheterization chronic urinary retention 24 1200   Output (mL) 550 mL 24 1300       [REMOVED] Urethral Catheter Straight-tip 16 Fr. (Removed)   Reason for Continuing Urinary Catheterization chronic urinary retention 24 0833       Tourniquet Times:         Implants:* No surgery found *     Findings: same    Indications: Irina Stratton is an 73 y.o. female who is having surgery for dysphaga    The patient was seen in the preoperative area. The risks, benefits, complications, treatment options, non-operative alternatives, expected recovery and outcomes were discussed with the  patient. The possibilities of reaction to medication, pulmonary aspiration, injury to surrounding structures, bleeding, recurrent infection, the need for additional procedures, failure to diagnose a condition, and creating a complication requiring transfusion or operation were discussed with the patient. The patient concurred with the proposed plan, giving informed consent.  The site of surgery was properly noted/marked if necessary per policy. The patient has been actively warmed in preoperative area. Preoperative antibiotics have been ordered and given within 1 hours of incision. Venous thrombosis prophylaxis have been ordered including chemical prophylaxis    Procedure Details: Patient was in the endoscopy suite. Under monitored anesthesia, the abdomen was subsequently prepped and draped in a fashion suitable for surgery. Upper endoscopy was performed by the gastroenterologist, Dr. Medina. EGD findings as noted . The abdomen and stomach was distended, the area was transilluminated. In a dependent portion of the stomach, a skin wheal was raised with 1% Xylocaine. 25-gauge needle entering into the stomach without complication. 16-gauge needle was subsequently inserted adjacent to the area under endoscopic vision without complication. The wire was placed and snared by the gastroenterologist. The feeding tube was fixed to the wire according to the 's guidelines. The skin incision was extended behind an 11 blade. Following the endoscope, the tube was brought out through the skin without complication. Examination of the area revealed a minimal amount of bleeding only. The area was not compromised. Appropriate bumper, locking device and was placed without complication. The EGD was terminated. Dry sterile dressing was applied. Patient tolerated the procedure without complication and transferred to the recovery room following termination of the procedure in stable condition.  Complications:  None; patient  tolerated the procedure well.    Disposition: PACU - hemodynamically stable.  Condition: stable         Additional Details:     Attending Attestation:     *No primary surgeon found*

## 2024-03-06 NOTE — PROGRESS NOTES
Irina Stratton is a 73 y.o. female on day 11 of admission presenting with UTI (urinary tract infection).      This note was created using voice recognition transcription software. Despite proofreading, unintentional typographical errors may be present. Please contact the GI office with any questions or concerns.       Subjective  Patient alert and awake.  Denies pain.          Physical Exam:  General: Polite, calm, resting  Skin:  Warm and dry, no jaundice  HEENT: No scleral icterus, no conjunctival pallor, normocephalic, atraumatic, mucous membranes moist  Neck:  atraumatic, trachea midline, no JVD  Chest:  Clear to auscultation bilaterally. No wheezes, rales, or rhonchi  CV:  Regular rate and rhythm.  Positive S1/S2  Abdomen: no distension, +BS, soft, non-tender to palpation, no rebound tenderness, no guarding, no rigidity, no discernible ascites   Extremities: no lower extremity edema, chronic pigmentary changes, no cyanosis  Neurological:  A&Ox3  Psychiatric: cooperative     Investigations:  Labs, radiological imaging and cardiac work up were reviewed        Objective:         3/5/2024     4:26 PM 3/5/2024     9:55 PM 3/5/2024    10:05 PM 3/5/2024    11:57 PM 3/6/2024     4:18 AM 3/6/2024     7:56 AM 3/6/2024    11:32 AM   Vitals   Systolic 169 176 176 161 161 133 157   Diastolic 81 80 80 82 77 66 74   Heart Rate 72 73 73 76 66 65 65   Temp 36.3 °C (97.3 °F)  36.3 °C (97.3 °F) 36.3 °C (97.3 °F) 36.3 °C (97.3 °F) 36.3 °C (97.3 °F) 36.2 °C (97.2 °F)   Resp 16   18 18 18 18          Medications:  calcitriol, 0.25 mcg, oral, Once per day on Mon Wed Fri  carbidopa-levodopa, 0.5 tablet, oral, TID  cholecalciferol, 1,000 Units, oral, Daily  gabapentin, 100 mg, oral, TID  gentamicin, , Topical, TID  insulin lispro, 0-10 Units, subcutaneous, TID with meals  levothyroxine, 88 mcg, oral, Daily before breakfast  metoprolol tartrate, 25 mg, oral, BID  mirtazapine, 15 mg, oral, Nightly  pantoprazole, 40 mg, intravenous,  Daily  perflutren lipid microspheres, 0.5-10 mL of dilution, intravenous, Once in imaging  perflutren protein A microsphere, 0.5 mL, intravenous, Once in imaging  piperacillin-tazobactam, 3.375 g, intravenous, q6h  polyethylene glycol, 17 g, oral, Daily  pramoxine-calamine, 1 Application, topical (top), BID  [Held by provider] rivaroxaban, 20 mg, oral, Daily  rosuvastatin, 40 mg, oral, Daily  sodium hypochlorite, , irrigation, TID  sulfur hexafluoride microsphr, 2 mL, intravenous, Once in imaging         Recent Results (from the past 72 hour(s))   POCT GLUCOSE    Collection Time: 03/03/24  4:37 PM   Result Value Ref Range    POCT Glucose 186 (H) 74 - 99 mg/dL   POCT GLUCOSE    Collection Time: 03/03/24  7:42 PM   Result Value Ref Range    POCT Glucose 114 (H) 74 - 99 mg/dL   CBC    Collection Time: 03/04/24  4:58 AM   Result Value Ref Range    WBC 8.2 4.4 - 11.3 x10*3/uL    nRBC 0.0 0.0 - 0.0 /100 WBCs    RBC 3.85 (L) 4.00 - 5.20 x10*6/uL    Hemoglobin 10.7 (L) 12.0 - 16.0 g/dL    Hematocrit 34.9 (L) 36.0 - 46.0 %    MCV 91 80 - 100 fL    MCH 27.8 26.0 - 34.0 pg    MCHC 30.7 (L) 32.0 - 36.0 g/dL    RDW 18.6 (H) 11.5 - 14.5 %    Platelets 332 150 - 450 x10*3/uL   Comprehensive Metabolic Panel    Collection Time: 03/04/24  4:58 AM   Result Value Ref Range    Glucose 139 (H) 74 - 99 mg/dL    Sodium 138 136 - 145 mmol/L    Potassium 4.0 3.5 - 5.3 mmol/L    Chloride 105 98 - 107 mmol/L    Bicarbonate 23 21 - 32 mmol/L    Anion Gap 14 10 - 20 mmol/L    Urea Nitrogen 42 (H) 6 - 23 mg/dL    Creatinine 1.03 0.50 - 1.05 mg/dL    eGFR 58 (L) >60 mL/min/1.73m*2    Calcium 9.1 8.6 - 10.3 mg/dL    Albumin 3.2 (L) 3.4 - 5.0 g/dL    Alkaline Phosphatase 78 33 - 136 U/L    Total Protein 7.5 6.4 - 8.2 g/dL    AST 21 9 - 39 U/L    Bilirubin, Total 0.6 0.0 - 1.2 mg/dL    ALT 7 7 - 45 U/L   POCT GLUCOSE    Collection Time: 03/04/24  6:21 AM   Result Value Ref Range    POCT Glucose 149 (H) 74 - 99 mg/dL   POCT GLUCOSE    Collection Time:  03/04/24 12:03 PM   Result Value Ref Range    POCT Glucose 104 (H) 74 - 99 mg/dL   POCT GLUCOSE    Collection Time: 03/04/24  4:42 PM   Result Value Ref Range    POCT Glucose 125 (H) 74 - 99 mg/dL   POCT GLUCOSE    Collection Time: 03/04/24  8:21 PM   Result Value Ref Range    POCT Glucose 112 (H) 74 - 99 mg/dL   CBC    Collection Time: 03/05/24  4:38 AM   Result Value Ref Range    WBC 7.3 4.4 - 11.3 x10*3/uL    nRBC 0.0 0.0 - 0.0 /100 WBCs    RBC 3.93 (L) 4.00 - 5.20 x10*6/uL    Hemoglobin 11.0 (L) 12.0 - 16.0 g/dL    Hematocrit 36.4 36.0 - 46.0 %    MCV 93 80 - 100 fL    MCH 28.0 26.0 - 34.0 pg    MCHC 30.2 (L) 32.0 - 36.0 g/dL    RDW 18.6 (H) 11.5 - 14.5 %    Platelets 348 150 - 450 x10*3/uL   Comprehensive Metabolic Panel    Collection Time: 03/05/24  4:38 AM   Result Value Ref Range    Glucose 107 (H) 74 - 99 mg/dL    Sodium 140 136 - 145 mmol/L    Potassium 4.0 3.5 - 5.3 mmol/L    Chloride 107 98 - 107 mmol/L    Bicarbonate 22 21 - 32 mmol/L    Anion Gap 15 10 - 20 mmol/L    Urea Nitrogen 29 (H) 6 - 23 mg/dL    Creatinine 1.09 (H) 0.50 - 1.05 mg/dL    eGFR 54 (L) >60 mL/min/1.73m*2    Calcium 9.1 8.6 - 10.3 mg/dL    Albumin 3.2 (L) 3.4 - 5.0 g/dL    Alkaline Phosphatase 73 33 - 136 U/L    Total Protein 7.7 6.4 - 8.2 g/dL    AST 25 9 - 39 U/L    Bilirubin, Total 0.8 0.0 - 1.2 mg/dL    ALT 5 (L) 7 - 45 U/L   POCT GLUCOSE    Collection Time: 03/05/24  6:36 AM   Result Value Ref Range    POCT Glucose 128 (H) 74 - 99 mg/dL   POCT GLUCOSE    Collection Time: 03/05/24  1:17 PM   Result Value Ref Range    POCT Glucose 97 74 - 99 mg/dL   POCT GLUCOSE    Collection Time: 03/05/24  4:26 PM   Result Value Ref Range    POCT Glucose 79 74 - 99 mg/dL   POCT GLUCOSE    Collection Time: 03/05/24  8:22 PM   Result Value Ref Range    POCT Glucose 82 74 - 99 mg/dL   CBC    Collection Time: 03/06/24  4:52 AM   Result Value Ref Range    WBC 7.3 4.4 - 11.3 x10*3/uL    nRBC 0.0 0.0 - 0.0 /100 WBCs    RBC 3.88 (L) 4.00 - 5.20 x10*6/uL     Hemoglobin 10.5 (L) 12.0 - 16.0 g/dL    Hematocrit 35.5 (L) 36.0 - 46.0 %    MCV 92 80 - 100 fL    MCH 27.1 26.0 - 34.0 pg    MCHC 29.6 (L) 32.0 - 36.0 g/dL    RDW 18.1 (H) 11.5 - 14.5 %    Platelets 285 150 - 450 x10*3/uL   Comprehensive Metabolic Panel    Collection Time: 03/06/24  4:52 AM   Result Value Ref Range    Glucose 69 (L) 74 - 99 mg/dL    Sodium 137 136 - 145 mmol/L    Potassium 3.6 3.5 - 5.3 mmol/L    Chloride 105 98 - 107 mmol/L    Bicarbonate 22 21 - 32 mmol/L    Anion Gap 14 10 - 20 mmol/L    Urea Nitrogen 22 6 - 23 mg/dL    Creatinine 1.01 0.50 - 1.05 mg/dL    eGFR 59 (L) >60 mL/min/1.73m*2    Calcium 8.7 8.6 - 10.3 mg/dL    Albumin 3.1 (L) 3.4 - 5.0 g/dL    Alkaline Phosphatase 74 33 - 136 U/L    Total Protein 7.5 6.4 - 8.2 g/dL    AST 26 9 - 39 U/L    Bilirubin, Total 1.0 0.0 - 1.2 mg/dL    ALT 4 (L) 7 - 45 U/L   POCT GLUCOSE    Collection Time: 03/06/24  6:37 AM   Result Value Ref Range    POCT Glucose 84 74 - 99 mg/dL   POCT GLUCOSE    Collection Time: 03/06/24 11:30 AM   Result Value Ref Range    POCT Glucose 129 (H) 74 - 99 mg/dL          Assessment:  This is a 74 yo Female with a PMH of Parkinson's CKD, hypothyroidism, A fib on Xarelto, DM, HTN, HLD, depression, MI, sacral osteomyelitis, and cholelithiasis [facility] with reports of lethargy.  GI was consulted for PEG tube placement.  EGD with PEG placement on 3/5/24. Also notated was a small sliding hiatal hernia.          Plan  1.)  PEG tube placement-As stated above.  Adjusted to 4 cm jorge a.  Nursing staff to flush per policy and maintain site.  Also, please maintain abdominal binder to prevent dislodging. Okay for TF and medications.  Aspiration precautions.      Discussed patient with Dr. Medina.    I spent 20 minutes in the professional and overall care of this patient.      03/06/24 at 2:38 PM - ACE Alejandro-CNP

## 2024-03-06 NOTE — CARE PLAN
The patient's goals for the shift include Do not pull line    The clinical goals for the shift include Remain hemodynamically stable    Over the shift, the patient did make progress toward the above goals. Barriers to progression include disease process and emotional state. Recommendations to address these barriers include continue to follow plan of care.

## 2024-03-06 NOTE — DISCHARGE SUMMARY
Discharge Diagnosis  UTI (urinary tract infection)    Issues Requiring Follow-Up  Patient fully evaluated on March 6 and tolerating tube feeds at 30 cc an hour.  Tube feeds to be advanced this evening and patient will discharge in AM.  Tube feed delivery is planned for tomorrow    Discharge Meds     Your medication list        START taking these medications        Instructions Last Dose Given Next Dose Due   mirtazapine 15 mg tablet  Commonly known as: Remeron      Take 1 tablet (15 mg) by mouth once daily at bedtime.       polyethylene glycol 17 gram packet  Commonly known as: Glycolax, Miralax  Start taking on: March 7, 2024      Take 17 g by mouth once daily. Do not start before March 7, 2024.              CHANGE how you take these medications        Instructions Last Dose Given Next Dose Due   Xarelto 20 mg tablet  Generic drug: rivaroxaban  What changed: Another medication with the same name was added. Make sure you understand how and when to take each.           rivaroxaban 20 mg tablet  Commonly known as: Xarelto  Start taking on: March 7, 2024  What changed: You were already taking a medication with the same name, and this prescription was added. Make sure you understand how and when to take each.      Take 1 tablet (20 mg) by mouth once daily with breakfast. Take with food. Do not start before March 7, 2024.              CONTINUE taking these medications        Instructions Last Dose Given Next Dose Due   calcitriol 0.25 mcg capsule  Commonly known as: Rocaltrol           carbidopa-levodopa  mg tablet  Commonly known as: Sinemet      Take 1/2 tab TID with small meals x 7 days then take 1 tab TID and continue       cholecalciferol 25 MCG (1000 UT) capsule  Commonly known as: Vitamin D-3           gabapentin 100 mg capsule  Commonly known as: Neurontin      TAKE 1 CAPSULE BY MOUTH 3 TIMES  DAILY       levothyroxine 88 mcg tablet  Commonly known as: Synthroid, Levoxyl           metoprolol tartrate 25 mg  tablet  Commonly known as: Lopressor           rosuvastatin 40 mg tablet  Commonly known as: Crestor      Take 1 tablet (40 mg) by mouth once daily.       triamcinolone 0.1 % cream  Commonly known as: Kenalog                  STOP taking these medications      DULoxetine 30 mg DR capsule  Commonly known as: Cymbalta        glimepiride 1 mg tablet  Commonly known as: Amaryl        oxyCODONE-acetaminophen 5-325 mg tablet  Commonly known as: Percocet                  Where to Get Your Medications        These medications were sent to [address] [facility]  [address] [facility] [address]      Phone: [phone]   mirtazapine 15 mg tablet  rivaroxaban 20 mg tablet       These medications were sent to [facility] [address]  [address] [address]      Phone: [phone]   gabapentin 100 mg capsule       Information about where to get these medications is not yet available    Ask your nurse or doctor about these medications  polyethylene glycol 17 gram packet         Test Results Pending At Discharge  Pending Labs       No current pending labs.            Hospital Course         Balaji Fitzgerald MD  Physician  Internal Medicine     Progress Notes     Addendum     Date of Service: 3/5/2024  3:03 PM     Addendum       Expand All Collapse All    12,001 me see her will be gotten Jose Carloshattie Fitzgerald MD  Physician  Internal Medicine     Progress Notes     Signed     Date of Service: 3/3/2024  4:26 PM      Signed        Expand All Collapse All    Irina Stratton is a 73 y.o. female on day 8 of admission presenting with UTI (urinary tract infection).           Subjective   Patient fully evaluated on February 28 and clinically improved.           , Alert good okayObjective   Last Recorded Vitals  /75   Pulse 87   Temp 36.8 °C (98.2 °F)   Resp 18   Wt 53.5 kg (117 lb 15.1 oz)   SpO2 93%   Intake/Output last 3 Shifts:      Intake/Output Summary (Last 24 hours) at 3/3/2024 1626  Last data filed at 3/3/2024 1500        Gross per 24 hour   Intake 990 ml   Output 1450 ml   Net -460 ml            Admission Weight  Weight: 52 kg (114 lb 10.2 oz) (02/24/24 0300)     Daily Weight  03/03/24 : 53.5 kg (117 lb 15.1 oz)     Image Results  Transthoracic Echo (TTE) Complete      [facility], [address] [place] [id number] [phone] and                                  Fax [phone]     TRANSTHORACIC ECHOCARDIOGRAM REPORT        Patient Name:      ANTHONY JARRELL RAVI Wong Physician:    [id number] Garrett Daley MD  Study Date:        2/25/2024            Ordering Provider:    [id number] PASTOR HEARD  MRN/PID:           [id number]             Fellow:  Accession#:        [id number]         Nurse:  Date of Birth/Age: 1950 / 73 years Sonographer:          Sonam Martinez RDCS  Gender:            F                    Additional Staff:  Height:            167.00 cm            Admit Date:           2/24/2024  Weight:            56.00 kg             Admission Status:     Inpatient -                                                                Priority discharge  BSA / BMI:         1.62 m2 / 20.08      Encounter#:           [id number]                     kg/m2                                          Department Location:  [facility]  Blood Pressure: 127 /59 mmHg     Study Type:    TRANSTHORACIC ECHO (TTE) COMPLETE  Diagnosis/ICD: Elevated Troponin-R79.89  Indication:    Elevated Troponin  CPT Code:      Echo Complete w Full Doppler-[id number]     Patient History:  Pertinent History: A-Fib, AAA and HTN.     Study Detail: The following Echo studies were performed: 2D, M-Mode, Doppler and                color flow. Technically challenging study due to prominent lung                artifact.        PHYSICIAN  INTERPRETATION:  Left Ventricle: The left ventricular systolic function is low normal, with an estimated ejection fraction of 50%. There are no regional wall motion abnormalities. The left ventricular cavity size is normal. Spectral Doppler shows an impaired relaxation pattern of left ventricular diastolic filling.  Left Atrium: The left atrium is upper limits of normal in size.  Right Ventricle: The right ventricle is normal in size. There is normal right ventricular global systolic function.  Right Atrium: The right atrium is normal in size.  Aortic Valve: The aortic valve is trileaflet. There is evidence of mild aortic valve stenosis.  There is no evidence of aortic valve regurgitation. The peak instantaneous gradient of the aortic valve is 11.3 mmHg. The mean gradient of the aortic valve is 6.0 mmHg.  Mitral Valve: The mitral valve is normal in structure. There is mild mitral valve regurgitation.  Tricuspid Valve: The tricuspid valve is structurally normal. No evidence of tricuspid regurgitation.  Pulmonic Valve: The pulmonic valve is structurally normal. There is no indication of pulmonic valve regurgitation.  Pericardium: There is no pericardial effusion noted.  Aorta: The aortic root is normal.        CONCLUSIONS:   1. Left ventricular systolic function is low normal with a 50% estimated ejection fraction.   2. Spectral Doppler shows an impaired relaxation pattern of left ventricular diastolic filling.   3. Mild aortic valve stenosis.     QUANTITATIVE DATA SUMMARY:  2D MEASUREMENTS:                            Normal Ranges:  IVSd:          1.05 cm    (0.6-1.1cm)  LVPWd:         1.02 cm    (0.6-1.1cm)  LVIDd:         4.78 cm    (3.9-5.9cm)  LVIDs:         3.27 cm  LV Mass Index: 109.0 g/m2  LV % FS        31.6 %     AORTA MEASUREMENTS:                     Normal Ranges:  Asc Ao, d: 3.20 cm (2.1-3.4cm)     LV SYSTOLIC FUNCTION BY 2D PLANIMETRY (MOD):                      Normal Ranges:  EF-A4C View: 52.0 %  (>=55%)  EF-A2C View: 45.9 %  EF-Biplane:  49.7 %     LV DIASTOLIC FUNCTION:                         Normal Ranges:  MV Peak E:    0.93 m/s (0.7-1.2 m/s)  MV Peak A:    0.92 m/s (0.42-0.7 m/s)  E/A Ratio:    1.01     (1.0-2.2)  MV lateral e' 0.09 m/s  MV medial e'  0.08 m/s     MITRAL VALVE:                  Normal Ranges:  MV DT: 218 msec (150-240msec)     AORTIC VALVE:                                     Normal Ranges:  AoV Vmax:                1.68 m/s  (<=1.7m/s)  AoV Peak P.3 mmHg (<20mmHg)  AoV Mean P.0 mmHg  (1.7-11.5mmHg)  LVOT Max Oscar:            0.63 m/s  (<=1.1m/s)  AoV VTI:                 41.50 cm  (18-25cm)  LVOT VTI:                15.70 cm  LVOT Diameter:           2.20 cm   (1.8-2.4cm)  AoV Area, VTI:           1.44 cm2  (2.5-5.5cm2)  AoV Area,Vmax:           1.42 cm2  (2.5-4.5cm2)  AoV Dimensionless Index: 0.38        RIGHT VENTRICLE:  RV Basal 2.39 cm  RV Mid   1.60 cm  RV Major 7.0 cm  TAPSE:   20.5 mm  RV s'    0.16 m/s     TRICUSPID VALVE/RVSP:                              Normal Ranges:  Peak TR Velocity: 2.36 m/s  RV Syst Pressure: 25.3 mmHg (< 30mmHg)     PULMONIC VALVE:                       Normal Ranges:  PV Max Oscar: 0.8 m/s  (0.6-0.9m/s)  PV Max P.3 mmHg  PV Mean P.0 mmHg  PV VTI:     18.50 cm        [id number] Garrett Daley MD  Electronically signed on 2024 at 1:51:45 PM        ** Final **        Physical Exam     Relevant Results                       Assessment/Plan      H&P     Addendum     Date of Service: 2024  5:46 AM      Addendum        Expand All Collapse All    History Of Present Illness  Irina Stratton is a 73 y.o. female with past medical history of Parkinson's, CKD, hypothyroidism, atrial fibrillation on Xarelto, diabetes, hypertension, hyperlipidemia, depression, MI, sacral osteomyelitis, and cholelithiasis, [facility] for lethargy. EMS stated she had a fever en route. Patient has a albarran  catheter that was recently changed on Wednesday.  stated patient has been more lethargic than normal. He states she does not communicate much and is non-ambulatory at baseline. Additional history obtained from chart.     ED course: Temp 37.5C, /71, HR 96, RR 20, 95% RA. EKG unavailable for my review. Labs: WBC 12.0, neutrophils 10.04. RBC 3.98, H&H 10.9/36.4.[id number]. Sodium 135. BUN 29. . Troponin 13. UA: hazy, 3+protein, moderate blood, small leukocytes, 1+bacteria, RBC>20, WBC 21-50. Urine and blood cultures collected. See imaging results below. Azithromycin, zosyn, vancomycin started in ED. Patient will be admitted under the care of Dr. Fitzgerald who will continue to follow. I was asked to H&P and place initial admission orders.  Past Medical History  Medical History           Past Medical History:   Diagnosis Date    Abnormal radiologic findings on diagnostic imaging of right kidney       Abnormal ultrasound of both kidneys    Personal history of other medical treatment       History of echocardiogram            Surgical History  Surgical History             Past Surgical History:   Procedure Laterality Date     SECTION, CLASSIC   2016      Section    CT ABDOMEN PELVIS ANGIOGRAM W AND/OR WO IV CONTRAST   2019     CT ABDOMEN PELVIS ANGIOGRAM W AND/OR WO IV CONTRAST 2019 [facility]    CT AORTA AND BILATERAL ILIOFEMORAL RUNOFF ANGIOGRAM W AND/OR WO IV CONTRAST   2020     CT AORTA AND BILATERAL ILIOFEMORAL RUNOFF ANGIOGRAM W AND/OR WO IV CONTRAST 2020 [facility]ICAL LEGACY    OTHER SURGICAL HISTORY   2020     History of prior surgery    OTHER SURGICAL HISTORY   2020     Coronary artery stent placement    OTHER SURGICAL HISTORY   2020     Abdominal aortic aneurysm repair endovascular    OTHER SURGICAL HISTORY   2020     Cardiac catheterization    OTHER SURGICAL HISTORY   2019     Renal angioplasty and stenting             Social History  She reports that she quit smoking about 13 months ago. Her smoking use included cigarettes. She has never used smokeless tobacco. She reports that she does not currently use alcohol. She reports that she does not use drugs. Lives with . Bedbound.     Family History  Family History               Family History   Problem Relation Name Age of Onset    Stroke Mother                Allergies  Patient has no known allergies.     Review of Systems   Limited ROS due to patient mentation.  Physical Exam  Constitutional:       Appearance: She is ill-appearing.      Comments: Cachetic appearance   HENT:      Head: Normocephalic and atraumatic.      Nose: Nose normal.      Mouth/Throat:      Mouth: Mucous membranes are moist.      Pharynx: Oropharynx is clear.      Comments: Poor dentition  Eyes:      Extraocular Movements: Extraocular movements intact.      Conjunctiva/sclera: Conjunctivae normal.      Pupils: Pupils are equal, round, and reactive to light.   Cardiovascular:      Rate and Rhythm: Normal rate and regular rhythm.      Pulses: Normal pulses.      Heart sounds: Normal heart sounds.   Pulmonary:      Effort: Pulmonary effort is normal.      Breath sounds: Normal breath sounds.   Abdominal:      General: Abdomen is flat. Bowel sounds are normal.      Palpations: Abdomen is soft.   Genitourinary:     Comments: Rain in place  Musculoskeletal:         General: Normal range of motion.      Cervical back: Normal range of motion and neck supple.   Skin:     General: Skin is warm and dry.      Capillary Refill: Capillary refill takes 2 to 3 seconds.      Comments: Dressing to sacrum   Neurological:      General: No focal deficit present.      Mental Status: She is alert. She is disoriented.      Comments: Answered some questions and followed some commands. Confusion noted.   Psychiatric:         Mood and Affect: Mood normal.            Last Recorded Vitals  Blood pressure 140/70, pulse 98,  temperature 37.5 °C (99.5 °F), temperature source Temporal, resp. rate 18, weight 52 kg (114 lb 10.2 oz), SpO2 94 %.     Relevant Results            Results for orders placed or performed during the hospital encounter of 02/23/24 (from the past 24 hour(s))   CBC and Auto Differential   Result Value Ref Range     WBC 12.0 (H) 4.4 - 11.3 x10*3/uL     nRBC 0.0 0.0 - 0.0 /100 WBCs     RBC 3.93 (L) 4.00 - 5.20 x10*6/uL     Hemoglobin 10.9 (L) 12.0 - 16.0 g/dL     Hematocrit 36.4 36.0 - 46.0 %     MCV 93 80 - 100 fL     MCH 27.7 26.0 - 34.0 pg     MCHC 29.9 (L) 32.0 - 36.0 g/dL     RDW 19.6 (H) 11.5 - 14.5 %     Platelets 232 150 - 450 x10*3/uL     Neutrophils % 83.7 40.0 - 80.0 %     Immature Granulocytes %, Automated 0.5 0.0 - 0.9 %     Lymphocytes % 7.8 13.0 - 44.0 %     Monocytes % 7.4 2.0 - 10.0 %     Eosinophils % 0.3 0.0 - 6.0 %     Basophils % 0.3 0.0 - 2.0 %     Neutrophils Absolute 10.04 (H) 1.60 - 5.50 x10*3/uL     Immature Granulocytes Absolute, Automated 0.06 0.00 - 0.50 x10*3/uL     Lymphocytes Absolute 0.94 0.80 - 3.00 x10*3/uL     Monocytes Absolute 0.89 (H) 0.05 - 0.80 x10*3/uL     Eosinophils Absolute 0.04 0.00 - 0.40 x10*3/uL     Basophils Absolute 0.04 0.00 - 0.10 x10*3/uL   Comprehensive Metabolic Panel   Result Value Ref Range     Glucose 166 (H) 74 - 99 mg/dL     Sodium 135 (L) 136 - 145 mmol/L     Potassium 4.1 3.5 - 5.3 mmol/L     Chloride 100 98 - 107 mmol/L     Bicarbonate 27 21 - 32 mmol/L     Anion Gap 12 10 - 20 mmol/L     Urea Nitrogen 29 (H) 6 - 23 mg/dL     Creatinine 0.80 0.50 - 1.05 mg/dL     eGFR 78 >60 mL/min/1.73m*2     Calcium 9.1 8.6 - 10.3 mg/dL     Albumin 3.4 3.4 - 5.0 g/dL     Alkaline Phosphatase 102 33 - 136 U/L     Total Protein 8.4 (H) 6.4 - 8.2 g/dL     AST 27 9 - 39 U/L     Bilirubin, Total 0.7 0.0 - 1.2 mg/dL     ALT 18 7 - 45 U/L   Lactate   Result Value Ref Range     Lactate 1.9 0.4 - 2.0 mmol/L   B-Type Natriuretic Peptide   Result Value Ref Range      (H) 0 -  99 pg/mL   Troponin I, High Sensitivity   Result Value Ref Range     Troponin I, High Sensitivity 13 0 - 13 ng/L   Urinalysis with Reflex Culture and Microscopic   Result Value Ref Range     Color, Urine Yellow Straw, Yellow     Appearance, Urine Hazy (N) Clear     Specific Gravity, Urine 1.017 1.005 - 1.035     pH, Urine 8.0 5.0, 5.5, 6.0, 6.5, 7.0, 7.5, 8.0     Protein, Urine >=500 (3+) (N) NEGATIVE mg/dL     Glucose, Urine NEGATIVE NEGATIVE mg/dL     Blood, Urine MODERATE (2+) (A) NEGATIVE     Ketones, Urine NEGATIVE NEGATIVE mg/dL     Bilirubin, Urine NEGATIVE NEGATIVE     Urobilinogen, Urine <2.0 <2.0 mg/dL     Nitrite, Urine NEGATIVE NEGATIVE     Leukocyte Esterase, Urine SMALL (1+) (A) NEGATIVE   Microscopic Only, Urine   Result Value Ref Range     WBC, Urine 21-50 (A) 1-5, NONE /HPF     RBC, Urine >20 (A) NONE, 1-2, 3-5 /HPF     Bacteria, Urine 1+ (A) NONE SEEN /HPF      XR chest 1 view     Result Date: 2/24/2024  STUDY: Chest Radiograph;  02/23/2024 11:02 PM INDICATION: Sepsis, evaluate for source.  COMPARISON: XR chest 12/04/2022.  CT chest/abdomen/pelvis 06/01/2023.  ACCESSION NUMBER(S): [id number] ORDERING CLINICIAN: KENNA ALCALA TECHNIQUE:  Frontal chest was obtained at 2302 hours. FINDINGS: CARDIOMEDIASTINAL SILHOUETTE: Cardiomediastinal silhouette is enlarged in size and configuration. Calcified plaque is seen in the aorta.  LUNGS: Increased interstitial markings are seen bilaterally with subtle patchy right basilar airspace opacity.  ABDOMEN: No remarkable upper abdominal findings.  BONES: No acute osseous changes.  Generalized osseous demineralization is noted.     Cardiomegaly with increased interstitial markings and subtle patchy right infrahilar airspace opacity.  Mild pulmonary edema secondary to CHF is favored although right lower lobe pneumonia is a possibility in the appropriate clinical setting. Signed by Salvatore Parada            Assessment/Plan   Principal Problem:    UTI (urinary  tract infection)      73-year-old female with past medical history of Parkinson's, CKD, hypothyroidism, atrial fibrillation on Xarelto, diabetes, hypertension, hyperlipidemia, depression, MI, sacral osteomyelitis, and cholelithiasis, who presented to [facility] ED today via EMS from home for lethargy. EMS stated she had a fever en route. Patient has a albarran catheter that was recently changed on Wednesday.  Continue antibiotics. Patient to be hospitalized for further medical management.      #Suspect sepsis likely 2/2 to pneumonia vs UTI  #Suspect pneumonia RLL  #Suspected UTI likely 2/2 chronic indwelling albarran catheter  #Elevated BNP  #Acute metabolic encephalopathy likely 2/2 suspected UTI/ pneumonia   #Leukocytosis  Admit to OBS/telemetry to Dr. Fitzgerald  Defer consults to attending per request  See imaging results above  Echocardiogram ordered  Continue Azithromycin, Zosyn and vancomycin. De-escalate as appropriate  Strep pneumonia urine and legionella ordered  Sputum culture ordered  Titrate oxygen as needed  Blood and Urine cultures pending  Change albarran catheter  Daily weight  PT/OT  Social work  Repeat labs in AM     #Pressure ulcer of sacral region, stage 4  #Pressure ulcer left hip, stage 1  Continue wound care regimen  Air mattress  Turn q 2 hours     #Acute on chronic anemia  H&H 10.9/36.4  Continue to monitor     Chronic issues  #Parkinson's disease  #CKD  #Hypothyroidism  #Atrial fibrillation  #DMII  #HTN  #HLD  #Depression  #MI  #Cholelithiasis  Continue home medications as appropriate when nursing completes home med rec  SSI with hypoglycemic protocol     #DVT prophylaxis  Continue home Xarelto  SCDs     I spent 45 minutes in the professional and overall care of this patient.           Addendum patient seen and evaluated agree with all the above to treat her for a urinary tract infection change in mental status history of Parkinson's repeat the CBC and the electrolytes continue with all above management  thank you                     Revision History          This patient has a urinary catheter              Reason for the urinary catheter remaining today?               Principal Problem:    UTI (urinary tract infection)  Active Problems:    Urinary tract infection associated with indwelling urethral catheter, initial encounter (CMS/[facility])              Malnutrition     Patient fully evaluated on February 29.  IV antibiotics have been discontinued will monitor overnight.  Recheck labs in AM.  Remeron added for appetite stimulant  I agree with the dietitian's malnutrition diagnosis.        Patient fully evaluated on February 28.  Urine culture results are noted.  Continue present IV antibiotics for additional 24 hours and recheck labs in a.m.  Patient fully evaluated on March 1.  Still with  and significant oral intake.  Family agreeable to PEG placement.  Continue present IV antibiotics for now     Patient fully evaluated on March 2.  Slight improvement in oral intake.  Patient agreeable to PEG placement now.  More awake and alert after narcotics were de-escalated.     Patient fully evaluated on March 3 and appetite slightly improved.  Continue puréed diet.  Await PEG placement.     Patient fully evaluated on March 4. Appetite still continues to be slightly improved. Patient continues to await PEG placement.      Balaji Fitzgerald MD                         Irina Stratton is a 73 y.o. female on day 10 of admission presenting with UTI (urinary tract infection).           Subjective   Patient clinically unchanged.  PEG placement planned for today.              Objective   Last Recorded Vitals  /81   Pulse 75   Temp 36.1 °C (97 °F)   Resp 16   Wt 53 kg (116 lb 13.5 oz)   SpO2 93%   Intake/Output last 3 Shifts:     Intake/Output Summary (Last 24 hours) at 3/5/2024 1503  Last data filed at 3/5/2024 1132      Gross per 24 hour   Intake 650 ml   Output 725 ml   Net -75 ml         Admission Weight  Weight: 52 kg  (114 lb 10.2 oz) (02/24/24 0300)     Daily Weight  03/05/24 : 53 kg (116 lb 13.5 oz)     Image Results  EGD w PEG Tube Placement  Table formatting from the original result was not included.  Impression  Small type I hiatal hernia  The cardia, body of the stomach, greater curve of the stomach, lesser   curve of the stomach, incisura, antrum and prepyloric region appeared   normal.  PEG tube placed  The duodenal bulb, 1st part of the duodenum and 2nd part of the duodenum   appeared normal.     Findings  Small sliding hiatal hernia (type I hiatal hernia) without Favio lesions   present - diaphragmatic impression 37 cm from the incisors, confirmed by   retroflexion. Hill grade I hiatal hernia  The cardia, body of the stomach, greater curve of the stomach, lesser   curve of the stomach, incisura, antrum and prepyloric region appeared   normal.  A [employer] scientific PEG tube measuring 20 Fr was successfully placed using   a deformable internal bolster via the pull technique after the site was   identified via transillumination and visualized indentation; distance from   external bolster to external end of tube: 3 cm  The duodenal bulb, 1st part of the duodenum and 2nd part of the duodenum   appeared normal.     Recommendation   Follow up with PCP      Indication  Dementia and Fistulae     Staff  Staff Role   Reza Deal MD Proceduralist   Lakshmi Medina MD Proceduralist      Medications  No administrations occurring from 1023 to 1126 on 03/05/24      Preprocedure  A history and physical has been performed, and patient medication   allergies have been reviewed. The patient's tolerance of previous   anesthesia has been reviewed. The risks and benefits of the procedure and   the sedation options and risks were discussed with the patient. All   questions were answered and informed consent obtained.     Details of the Procedure  The patient underwent monitored anesthesia care, which was administered by   the  procedural nurse and an anesthesia professional. The patient's blood   pressure, ECG, ETCO2, heart rate, level of consciousness, oxygen and   respirations were monitored throughout the procedure. The scope was   introduced through the mouth and advanced to the second part of the   duodenum. Retroflexion was performed in the cardia. Prior to the   procedure, the patient's H. Pylori status was unknown. The patient   experienced no blood loss. The procedure was not difficult. The patient   tolerated the procedure well. There were no apparent adverse events.      Antibiotics administered prior to procedure: yes  Site prep: Hibaclens  Patient Positioning: semi-upright            Events  Procedure Events   Event Event Time   ENDO SCOPE IN TIME 3/5/2024 11:13 AM   ENDO SCOPE OUT TIME 3/5/2024 11:20 AM      Specimens  No specimens collected     Procedure Location  [facility] [place] 9  [address] [facility] [address]  [phone]     Referring Provider  No referring provider defined for this encounter.     Procedure Provider  No name on fileImpression  Small type I hiatal hernia  The cardia, body of the stomach, greater curve of the stomach, lesser   curve of the stomach, incisura, antrum and prepyloric region appeared   normal.  PEG tube placed  The duodenal bulb, 1st part of the duodenum and 2nd part of the duodenum   appeared normal.     Findings  Small sliding hiatal hernia (type I hiatal hernia) without Favio lesions   present - diaphragmatic impression 37 cm from the incisors, confirmed by   retroflexion. Hill grade I hiatal hernia  The cardia, body of the stomach, greater curve of the stomach, lesser   curve of the stomach, incisura, antrum and prepyloric region appeared   normal.  A [employer] scientific PEG tube measuring 20 Fr was successfully placed using   a deformable internal bolster via the pull technique after the site was   identified via transillumination and visualized  indentation; distance from   external bolster to external end of tube: 3 cm  The duodenal bulb, 1st part of the duodenum and 2nd part of the duodenum   appeared normal.     Recommendation   Follow up with PCP      Indication  Cachexia (CMS/[facility])     Staff  Staff Role   Reza Deal MD Proceduralist   Lakshmi Medina MD Proceduralist      Medications  See Anesthesia Record.      Preprocedure  A history and physical has been performed, and patient medication   allergies have been reviewed. The patient's tolerance of previous   anesthesia has been reviewed. The risks and benefits of the procedure and   the sedation options and risks were discussed with the patient. All   questions were answered and informed consent obtained.     Details of the Procedure  The patient underwent monitored anesthesia care, which was administered by   the procedural nurse and an anesthesia professional. The patient's blood   pressure, ECG, ETCO2, heart rate, level of consciousness, oxygen and   respirations were monitored throughout the procedure. The scope was   introduced through the mouth and advanced to the second part of the   duodenum. Retroflexion was performed in the cardia. Prior to the   procedure, the patient's H. Pylori status was unknown. The patient   experienced no blood loss. The procedure was not difficult. The patient   tolerated the procedure well. There were no apparent adverse events.      Events  Procedure Events   Event Event Time   ENDO SCOPE IN TIME 3/5/2024 11:13 AM   ENDO SCOPE OUT TIME 3/5/2024 11:20 AM      Specimens  No specimens collected     Procedure Location  [facility] 9  [address] [facility] [address]  [phone]     Referring Provider  No referring provider defined for this encounter.     Procedure Provider  No name on file        Physical Exam     Relevant Results                          Assessment/Plan         This patient has a urinary catheter               Reason for the urinary catheter remaining today?       Balaji Fitzgerald MD  Physician  Internal Medicine     Progress Notes     Signed     Date of Service: 3/3/2024  4:26 PM      Signed        Expand All Collapse All    Irina Rodrigues is a 73 y.o. female on day 8 of admission presenting with UTI (urinary tract infection).           Subjective   Patient fully evaluated on February 28 and clinically improved.              Objective   Last Recorded Vitals  /75   Pulse 87   Temp 36.8 °C (98.2 °F)   Resp 18   Wt 53.5 kg (117 lb 15.1 oz)   SpO2 93%   Intake/Output last 3 Shifts:     Intake/Output Summary (Last 24 hours) at 3/3/2024 1626  Last data filed at 3/3/2024 1500        Gross per 24 hour   Intake 990 ml   Output 1450 ml   Net -460 ml            Admission Weight  Weight: 52 kg (114 lb 10.2 oz) (02/24/24 0300)     Daily Weight  03/03/24 : 53.5 kg (117 lb 15.1 oz)     Image Results  Transthoracic Echo (TTE) [facility], [address] [place], [place] [address] [phone] and                                  Fax [phone]     TRANSTHORACIC ECHOCARDIOGRAM REPORT        Patient Name:      IRINA RODRIGUES     Reading Physician:    [id number] Garrett Daley MD  Study Date:        2/25/2024            Ordering Provider:    [id number] PASTOR HEARD  MRN/PID:           [id number]             Fellow:  Accession#:        [id number]         Nurse:  Date of Birth/Age: 1950 / 73 years Sonographer:          Sonam Martinez RDCS  Gender:            F                    Additional Staff:  Height:            167.00 cm            Admit Date:           2/24/2024  Weight:            56.00 kg             Admission Status:     Inpatient -                                                                Priority discharge  BSA / BMI:         1.62 m2 / 20.08      Encounter#:            [id number]                     kg/m2                                          Department Location:  [facility]  Blood Pressure: 127 /59 mmHg     Study Type:    TRANSTHORACIC ECHO (TTE) COMPLETE  Diagnosis/ICD: Elevated Troponin-R79.89  Indication:    Elevated Troponin  CPT Code:      Echo Complete w Full Doppler-[id number]     Patient History:  Pertinent History: A-Fib, AAA and HTN.     Study Detail: The following Echo studies were performed: 2D, M-Mode, Doppler and                color flow. Technically challenging study due to prominent lung                artifact.        PHYSICIAN INTERPRETATION:  Left Ventricle: The left ventricular systolic function is low normal, with an estimated ejection fraction of 50%. There are no regional wall motion abnormalities. The left ventricular cavity size is normal. Spectral Doppler shows an impaired relaxation pattern of left ventricular diastolic filling.  Left Atrium: The left atrium is upper limits of normal in size.  Right Ventricle: The right ventricle is normal in size. There is normal right ventricular global systolic function.  Right Atrium: The right atrium is normal in size.  Aortic Valve: The aortic valve is trileaflet. There is evidence of mild aortic valve stenosis.  There is no evidence of aortic valve regurgitation. The peak instantaneous gradient of the aortic valve is 11.3 mmHg. The mean gradient of the aortic valve is 6.0 mmHg.  Mitral Valve: The mitral valve is normal in structure. There is mild mitral valve regurgitation.  Tricuspid Valve: The tricuspid valve is structurally normal. No evidence of tricuspid regurgitation.  Pulmonic Valve: The pulmonic valve is structurally normal. There is no indication of pulmonic valve regurgitation.  Pericardium: There is no pericardial effusion noted.  Aorta: The aortic root is normal.        CONCLUSIONS:   1. Left ventricular systolic function is low normal with a 50% estimated ejection fraction.   2. Spectral  Doppler shows an impaired relaxation pattern of left ventricular diastolic filling.   3. Mild aortic valve stenosis.     QUANTITATIVE DATA SUMMARY:  2D MEASUREMENTS:                            Normal Ranges:  IVSd:          1.05 cm    (0.6-1.1cm)  LVPWd:         1.02 cm    (0.6-1.1cm)  LVIDd:         4.78 cm    (3.9-5.9cm)  LVIDs:         3.27 cm  LV Mass Index: 109.0 g/m2  LV % FS        31.6 %     AORTA MEASUREMENTS:                     Normal Ranges:  Asc Ao, d: 3.20 cm (2.1-3.4cm)     LV SYSTOLIC FUNCTION BY 2D PLANIMETRY (MOD):                      Normal Ranges:  EF-A4C View: 52.0 % (>=55%)  EF-A2C View: 45.9 %  EF-Biplane:  49.7 %     LV DIASTOLIC FUNCTION:                         Normal Ranges:  MV Peak E:    0.93 m/s (0.7-1.2 m/s)  MV Peak A:    0.92 m/s (0.42-0.7 m/s)  E/A Ratio:    1.01     (1.0-2.2)  MV lateral e' 0.09 m/s  MV medial e'  0.08 m/s     MITRAL VALVE:                  Normal Ranges:  MV DT: 218 msec (150-240msec)     AORTIC VALVE:                                     Normal Ranges:  AoV Vmax:                1.68 m/s  (<=1.7m/s)  AoV Peak P.3 mmHg (<20mmHg)  AoV Mean P.0 mmHg  (1.7-11.5mmHg)  LVOT Max Oscar:            0.63 m/s  (<=1.1m/s)  AoV VTI:                 41.50 cm  (18-25cm)  LVOT VTI:                15.70 cm  LVOT Diameter:           2.20 cm   (1.8-2.4cm)  AoV Area, VTI:           1.44 cm2  (2.5-5.5cm2)  AoV Area,Vmax:           1.42 cm2  (2.5-4.5cm2)  AoV Dimensionless Index: 0.38        RIGHT VENTRICLE:  RV Basal 2.39 cm  RV Mid   1.60 cm  RV Major 7.0 cm  TAPSE:   20.5 mm  RV s'    0.16 m/s     TRICUSPID VALVE/RVSP:                              Normal Ranges:  Peak TR Velocity: 2.36 m/s  RV Syst Pressure: 25.3 mmHg (< 30mmHg)     PULMONIC VALVE:                       Normal Ranges:  PV Max Oscar: 0.8 m/s  (0.6-0.9m/s)  PV Max P.3 mmHg  PV Mean P.0 mmHg  PV VTI:     18.50 cm        [id number] Garrett Daley MD  Electronically signed on 2024 at  1:51:45 PM        ** Final **        Physical Exam     Relevant Results                       Assessment/Plan      H&P     Addendum     Date of Service: 2024  5:46 AM      Addendum        Expand All Collapse All    History Of Present Illness  Irina Stratton is a 73 y.o. female with past medical history of Parkinson's, CKD, hypothyroidism, atrial fibrillation on Xarelto, diabetes, hypertension, hyperlipidemia, depression, MI, sacral osteomyelitis, and cholelithiasis, who presented to [facility] ED today via EMS from home for lethargy. EMS stated she had a fever en route. Patient has a albarran catheter that was recently changed on Wednesday.  stated patient has been more lethargic than normal. He states she does not communicate much and is non-ambulatory at baseline. Additional history obtained from chart.     ED course: Temp 37.5C, /71, HR 96, RR 20, 95% RA. EKG unavailable for my review. Labs: WBC 12.0, neutrophils 10.04. RBC 3.98, H&H 10.9/36.4.[id number]. Sodium 135. BUN 29. . Troponin 13. UA: hazy, 3+protein, moderate blood, small leukocytes, 1+bacteria, RBC>20, WBC 21-50. Urine and blood cultures collected. See imaging results below. Azithromycin, zosyn, vancomycin started in ED. Patient will be admitted under the care of Dr. Fitzgerald who will continue to follow. I was asked to H&P and place initial admission orders.  Past Medical History  Medical History           Past Medical History:   Diagnosis Date    Abnormal radiologic findings on diagnostic imaging of right kidney       Abnormal ultrasound of both kidneys    Personal history of other medical treatment       History of echocardiogram            Surgical History  Surgical History             Past Surgical History:   Procedure Laterality Date     SECTION, CLASSIC   2016      Section    CT ABDOMEN PELVIS ANGIOGRAM W AND/OR WO IV CONTRAST   2019     CT ABDOMEN PELVIS ANGIOGRAM W AND/OR WO IV CONTRAST 2019  [facility]    CT AORTA AND BILATERAL ILIOFEMORAL RUNOFF ANGIOGRAM W AND/OR WO IV CONTRAST   5/31/2020     CT AORTA AND BILATERAL ILIOFEMORAL RUNOFF ANGIOGRAM W AND/OR WO IV CONTRAST 5/31/2020 [facility] CLINICAL LEGACY    OTHER SURGICAL HISTORY   06/17/2020     History of prior surgery    OTHER SURGICAL HISTORY   01/31/2020     Coronary artery stent placement    OTHER SURGICAL HISTORY   06/11/2020     Abdominal aortic aneurysm repair endovascular    OTHER SURGICAL HISTORY   01/31/2020     Cardiac catheterization    OTHER SURGICAL HISTORY   01/17/2019     Renal angioplasty and stenting            Social History  She reports that she quit smoking about 13 months ago. Her smoking use included cigarettes. She has never used smokeless tobacco. She reports that she does not currently use alcohol. She reports that she does not use drugs. Lives with . Bedbound.     Family History  Family History               Family History   Problem Relation Name Age of Onset    Stroke Mother                Allergies  Patient has no known allergies.     Review of Systems   Limited ROS due to patient mentation.  Physical Exam  Constitutional:       Appearance: She is ill-appearing.      Comments: Cachetic appearance   HENT:      Head: Normocephalic and atraumatic.      Nose: Nose normal.      Mouth/Throat:      Mouth: Mucous membranes are moist.      Pharynx: Oropharynx is clear.      Comments: Poor dentition  Eyes:      Extraocular Movements: Extraocular movements intact.      Conjunctiva/sclera: Conjunctivae normal.      Pupils: Pupils are equal, round, and reactive to light.   Cardiovascular:      Rate and Rhythm: Normal rate and regular rhythm.      Pulses: Normal pulses.      Heart sounds: Normal heart sounds.   Pulmonary:      Effort: Pulmonary effort is normal.      Breath sounds: Normal breath sounds.   Abdominal:      General: Abdomen is flat. Bowel sounds are normal.      Palpations: Abdomen is soft.   Genitourinary:      Comments: Rain in place  Musculoskeletal:         General: Normal range of motion.      Cervical back: Normal range of motion and neck supple.   Skin:     General: Skin is warm and dry.      Capillary Refill: Capillary refill takes 2 to 3 seconds.      Comments: Dressing to sacrum   Neurological:      General: No focal deficit present.      Mental Status: She is alert. She is disoriented.      Comments: Answered some questions and followed some commands. Confusion noted.   Psychiatric:         Mood and Affect: Mood normal.            Last Recorded Vitals  Blood pressure 140/70, pulse 98, temperature 37.5 °C (99.5 °F), temperature source Temporal, resp. rate 18, weight 52 kg (114 lb 10.2 oz), SpO2 94 %.     Relevant Results            Results for orders placed or performed during the hospital encounter of 02/23/24 (from the past 24 hour(s))   CBC and Auto Differential   Result Value Ref Range     WBC 12.0 (H) 4.4 - 11.3 x10*3/uL     nRBC 0.0 0.0 - 0.0 /100 WBCs     RBC 3.93 (L) 4.00 - 5.20 x10*6/uL     Hemoglobin 10.9 (L) 12.0 - 16.0 g/dL     Hematocrit 36.4 36.0 - 46.0 %     MCV 93 80 - 100 fL     MCH 27.7 26.0 - 34.0 pg     MCHC 29.9 (L) 32.0 - 36.0 g/dL     RDW 19.6 (H) 11.5 - 14.5 %     Platelets 232 150 - 450 x10*3/uL     Neutrophils % 83.7 40.0 - 80.0 %     Immature Granulocytes %, Automated 0.5 0.0 - 0.9 %     Lymphocytes % 7.8 13.0 - 44.0 %     Monocytes % 7.4 2.0 - 10.0 %     Eosinophils % 0.3 0.0 - 6.0 %     Basophils % 0.3 0.0 - 2.0 %     Neutrophils Absolute 10.04 (H) 1.60 - 5.50 x10*3/uL     Immature Granulocytes Absolute, Automated 0.06 0.00 - 0.50 x10*3/uL     Lymphocytes Absolute 0.94 0.80 - 3.00 x10*3/uL     Monocytes Absolute 0.89 (H) 0.05 - 0.80 x10*3/uL     Eosinophils Absolute 0.04 0.00 - 0.40 x10*3/uL     Basophils Absolute 0.04 0.00 - 0.10 x10*3/uL   Comprehensive Metabolic Panel   Result Value Ref Range     Glucose 166 (H) 74 - 99 mg/dL     Sodium 135 (L) 136 - 145 mmol/L     Potassium 4.1  3.5 - 5.3 mmol/L     Chloride 100 98 - 107 mmol/L     Bicarbonate 27 21 - 32 mmol/L     Anion Gap 12 10 - 20 mmol/L     Urea Nitrogen 29 (H) 6 - 23 mg/dL     Creatinine 0.80 0.50 - 1.05 mg/dL     eGFR 78 >60 mL/min/1.73m*2     Calcium 9.1 8.6 - 10.3 mg/dL     Albumin 3.4 3.4 - 5.0 g/dL     Alkaline Phosphatase 102 33 - 136 U/L     Total Protein 8.4 (H) 6.4 - 8.2 g/dL     AST 27 9 - 39 U/L     Bilirubin, Total 0.7 0.0 - 1.2 mg/dL     ALT 18 7 - 45 U/L   Lactate   Result Value Ref Range     Lactate 1.9 0.4 - 2.0 mmol/L   B-Type Natriuretic Peptide   Result Value Ref Range      (H) 0 - 99 pg/mL   Troponin I, High Sensitivity   Result Value Ref Range     Troponin I, High Sensitivity 13 0 - 13 ng/L   Urinalysis with Reflex Culture and Microscopic   Result Value Ref Range     Color, Urine Yellow Straw, Yellow     Appearance, Urine Hazy (N) Clear     Specific Gravity, Urine 1.017 1.005 - 1.035     pH, Urine 8.0 5.0, 5.5, 6.0, 6.5, 7.0, 7.5, 8.0     Protein, Urine >=500 (3+) (N) NEGATIVE mg/dL     Glucose, Urine NEGATIVE NEGATIVE mg/dL     Blood, Urine MODERATE (2+) (A) NEGATIVE     Ketones, Urine NEGATIVE NEGATIVE mg/dL     Bilirubin, Urine NEGATIVE NEGATIVE     Urobilinogen, Urine <2.0 <2.0 mg/dL     Nitrite, Urine NEGATIVE NEGATIVE     Leukocyte Esterase, Urine SMALL (1+) (A) NEGATIVE   Microscopic Only, Urine   Result Value Ref Range     WBC, Urine 21-50 (A) 1-5, NONE /HPF     RBC, Urine >20 (A) NONE, 1-2, 3-5 /HPF     Bacteria, Urine 1+ (A) NONE SEEN /HPF      XR chest 1 view     Result Date: 2/24/2024  STUDY: Chest Radiograph;  02/23/2024 11:02 PM INDICATION: Sepsis, evaluate for source.  COMPARISON: XR chest 12/04/2022.  CT chest/abdomen/pelvis 06/01/2023.  ACCESSION NUMBER(S): [id number] ORDERING CLINICIAN: KENNA ALCALA TECHNIQUE:  Frontal chest was obtained at 2302 hours. FINDINGS: CARDIOMEDIASTINAL SILHOUETTE: Cardiomediastinal silhouette is enlarged in size and configuration. Calcified plaque is seen  in the aorta.  LUNGS: Increased interstitial markings are seen bilaterally with subtle patchy right basilar airspace opacity.  ABDOMEN: No remarkable upper abdominal findings.  BONES: No acute osseous changes.  Generalized osseous demineralization is noted.     Cardiomegaly with increased interstitial markings and subtle patchy right infrahilar airspace opacity.  Mild pulmonary edema secondary to CHF is favored although right lower lobe pneumonia is a possibility in the appropriate clinical setting. Signed by Salvatore Parada            Assessment/Plan   Principal Problem:    UTI (urinary tract infection)      73-year-old female with past medical history of Parkinson's, CKD, hypothyroidism, atrial fibrillation on Xarelto, diabetes, hypertension, hyperlipidemia, depression, MI, sacral osteomyelitis, and cholelithiasis, who presented to [facility] ED today via EMS from home for lethargy. EMS stated she had a fever en route. Patient has a albarran catheter that was recently changed on Wednesday.  Continue antibiotics. Patient to be hospitalized for further medical management.      #Suspect sepsis likely 2/2 to pneumonia vs UTI  #Suspect pneumonia RLL  #Suspected UTI likely 2/2 chronic indwelling albarran catheter  #Elevated BNP  #Acute metabolic encephalopathy likely 2/2 suspected UTI/ pneumonia   #Leukocytosis  Admit to OBS/telemetry to Dr. Fitzgerald  Defer consults to attending per request  See imaging results above  Echocardiogram ordered  Continue Azithromycin, Zosyn and vancomycin. De-escalate as appropriate  Strep pneumonia urine and legionella ordered  Sputum culture ordered  Titrate oxygen as needed  Blood and Urine cultures pending  Change albarran catheter  Daily weight  PT/OT  Social work  Repeat labs in AM     #Pressure ulcer of sacral region, stage 4  #Pressure ulcer left hip, stage 1  Continue wound care regimen  Air mattress  Turn q 2 hours     #Acute on chronic anemia  H&H 10.9/36.4  Continue to monitor     Chronic  issues  #Parkinson's disease  #CKD  #Hypothyroidism  #Atrial fibrillation  #DMII  #HTN  #HLD  #Depression  #MI  #Cholelithiasis  Continue home medications as appropriate when nursing completes home med rec  SSI with hypoglycemic protocol     #DVT prophylaxis  Continue home Xarelto  SCDs     I spent 45 minutes in the professional and overall care of this patient.           Addendum patient seen and evaluated agree with all the above to treat her for a urinary tract infection change in mental status history of Parkinson's repeat the CBC and the electrolytes continue with all above management thank you                     Revision History          This patient has a urinary catheter              Reason for the urinary catheter remaining today?               Principal Problem:    UTI (urinary tract infection)  Active Problems:    Urinary tract infection associated with indwelling urethral catheter, initial encounter (CMS/[facility])              Malnutrition     Patient fully evaluated on February 29.  IV antibiotics have been discontinued will monitor overnight.  Recheck labs in AM.  Remeron added for appetite stimulant  I agree with the dietitian's malnutrition diagnosis.        Patient fully evaluated on February 28.  Urine culture results are noted.  Continue present IV antibiotics for additional 24 hours and recheck labs in a.m.  Patient fully evaluated on March 1.  Still with  and significant oral intake.  Family agreeable to PEG placement.  Continue present IV antibiotics for now     Patient fully evaluated on March 2.  Slight improvement in oral intake.  Patient agreeable to PEG placement now.  More awake and alert after narcotics were de-escalated.     Patient fully evaluated on March 3 and appetite slightly improved.  Continue puréed diet.  Await PEG placement.     Patient fully evaluated on March 4. Appetite still continues to be slightly improved. Patient continues to await PEG placement.   Patient fully evaluated  on March 5.  PEG placement today.  Tube feeds to be initiated.  Continue diet and advance as tolerated.  Recheck labs in AM.  Balaji Fitzgerald MD                                       Principal Problem:    UTI (urinary tract infection)  Active Problems:    Urinary tract infection associated with indwelling urethral catheter, initial encounter (CMS/[facility])              Malnutrition    I agree with the dietitian's malnutrition diagnosis.        Patient fully evaluated and will require PEG feeding tube for severe protein calorie malnutrition with significant dysphagia.  Patient's main source of nutrition will be tube feeds with a pleasure diet as tolerated     Balaji Fitzgerald MD                      Revision History         Pertinent Physical Exam At Time of Discharge  Physical Exam    Outpatient Follow-Up  Future Appointments   Date Time Provider Department Center   3/15/2024 11:00 AM Woundcare Provider [id number] [place]   4/9/2024 11:40 AM Mindi Stahl MD [id number] [place]   6/27/2024 11:00 AM Mario Joe MD [id number] [place]         Balaji Fitzgerald MD

## 2024-03-06 NOTE — PROGRESS NOTES
Pt was started on Jevity 1.5 TF this morning.  Plan is for home with hh and new tube feedings , info was faxed to [facility] for tube feeding supplies and equipment, will need more info for  medicare to coverage cost  and Dr Fitzgerald is aware of this matter, he needs to write in process note.  Pt will go home in am with [facility] if continues to tolerate TF's  Mely Nair RN [facility]    [phone] Faxed info to [facility] c/o [place] notes stating medical necessity for TF.  MD planning on discharging this evening but TF will not be completed  until tomorrow per [facility].    Mely Nair RN T[facility]

## 2024-03-07 NOTE — DISCHARGE SUMMARY
Discharge Diagnosis  UTI (urinary tract infection)    Issues Requiring Follow-Up  Patient fully evaluated on March 7 and tolerating tube feeds    Discharge Meds     Your medication list        START taking these medications        Instructions Last Dose Given Next Dose Due   mirtazapine 15 mg tablet  Commonly known as: Remeron      Take 1 tablet (15 mg) by mouth once daily at bedtime.       polyethylene glycol 17 gram packet  Commonly known as: Glycolax, Miralax      Take 17 g by mouth once daily. Do not start before March 7, 2024.              CHANGE how you take these medications        Instructions Last Dose Given Next Dose Due   Xarelto 20 mg tablet  Generic drug: rivaroxaban  What changed: Another medication with the same name was added. Make sure you understand how and when to take each.           rivaroxaban 20 mg tablet  Commonly known as: Xarelto  What changed: You were already taking a medication with the same name, and this prescription was added. Make sure you understand how and when to take each.      Take 1 tablet (20 mg) by mouth once daily with breakfast. Take with food. Do not start before March 7, 2024.              CONTINUE taking these medications        Instructions Last Dose Given Next Dose Due   calcitriol 0.25 mcg capsule  Commonly known as: Rocaltrol           carbidopa-levodopa  mg tablet  Commonly known as: Sinemet      Take 1/2 tab TID with small meals x 7 days then take 1 tab TID and continue       cholecalciferol 25 MCG (1000 UT) capsule  Commonly known as: Vitamin D-3           gabapentin 100 mg capsule  Commonly known as: Neurontin      TAKE 1 CAPSULE BY MOUTH 3 TIMES  DAILY       levothyroxine 88 mcg tablet  Commonly known as: Synthroid, Levoxyl           metoprolol tartrate 25 mg tablet  Commonly known as: Lopressor           rosuvastatin 40 mg tablet  Commonly known as: Crestor      Take 1 tablet (40 mg) by mouth once daily.       triamcinolone 0.1 % cream  Commonly known as:  Juliana                  STOP taking these medications      DULoxetine 30 mg DR capsule  Commonly known as: Cymbalta        glimepiride 1 mg tablet  Commonly known as: Amaryl        oxyCODONE-acetaminophen 5-325 mg tablet  Commonly known as: Percocet                  Where to Get Your Medications        These medications were sent to [facility]  - [place], [place] - [address]  [address] [facility] [address]      Phone: [phone]   mirtazapine 15 mg tablet  rivaroxaban 20 mg tablet       These medications were sent to [facility] - [place], [place] - [address]  [address] [facility] [place] [facility] [address] [address]      Phone: [phone]   gabapentin 100 mg capsule       Information about where to get these medications is not yet available    Ask your nurse or doctor about these medications  polyethylene glycol 17 gram packet         Test Results Pending At Discharge  Pending Labs       No current pending labs.            Hospital Course         Balaji Fitzgerald MD   Physician  Internal Medicine     Discharge Summary     Signed     Date of Service: 3/6/2024  4:32 PM     Signed         Discharge Diagnosis  UTI (urinary tract infection)     Issues Requiring Follow-Up  Patient fully evaluated on March 6 and tolerating tube feeds at 30 cc an hour.  Tube feeds to be advanced this evening and patient will discharge in AM.  Tube feed delivery is planned for tomorrow     Discharge Meds      Your medication list          START taking these medications         Instructions Last Dose Given Next Dose Due   mirtazapine 15 mg tablet  Commonly known as: Remeron       Take 1 tablet (15 mg) by mouth once daily at bedtime.          polyethylene glycol 17 gram packet  Commonly known as: Glycolax, Miralax  Start taking on: March 7, 2024       Take 17 g by mouth once daily. Do not start before March 7, 2024.                    CHANGE how you take these medications         Instructions Last Dose Given Next Dose  Due   Xarelto 20 mg tablet  Generic drug: rivaroxaban  What changed: Another medication with the same name was added. Make sure you understand how and when to take each.               rivaroxaban 20 mg tablet  Commonly known as: Xarelto  Start taking on: March 7, 2024  What changed: You were already taking a medication with the same name, and this prescription was added. Make sure you understand how and when to take each.       Take 1 tablet (20 mg) by mouth once daily with breakfast. Take with food. Do not start before March 7, 2024.                    CONTINUE taking these medications         Instructions Last Dose Given Next Dose Due   calcitriol 0.25 mcg capsule  Commonly known as: Rocaltrol               carbidopa-levodopa  mg tablet  Commonly known as: Sinemet       Take 1/2 tab TID with small meals x 7 days then take 1 tab TID and continue          cholecalciferol 25 MCG (1000 UT) capsule  Commonly known as: Vitamin D-3               gabapentin 100 mg capsule  Commonly known as: Neurontin       TAKE 1 CAPSULE BY MOUTH 3 TIMES  DAILY          levothyroxine 88 mcg tablet  Commonly known as: Synthroid, Levoxyl               metoprolol tartrate 25 mg tablet  Commonly known as: Lopressor               rosuvastatin 40 mg tablet  Commonly known as: Crestor       Take 1 tablet (40 mg) by mouth once daily.          triamcinolone 0.1 % cream  Commonly known as: Kenalog                         STOP taking these medications       DULoxetine 30 mg DR capsule  Commonly known as: Cymbalta         glimepiride 1 mg tablet  Commonly known as: Amaryl         oxyCODONE-acetaminophen 5-325 mg tablet  Commonly known as: Percocet                       Where to Get Your Medications          These medications were sent to [facility] [place], [place] - [address]  [address] [place] [address]        Phone: [phone]   mirtazapine 15 mg tablet  rivaroxaban 20 mg tablet         These medications were sent to [facility] - [place], [place] - [address]  [address] [facility] [address], [facility] [address]        Phone: [phone]   gabapentin 100 mg capsule         Information about where to get these medications is not yet available    Ask your nurse or doctor about these medications  polyethylene glycol 17 gram packet            Test Results Pending At Discharge  Pending Labs         No current pending labs.                Hospital Course         Balaji Fitzgerald MD  Physician  Internal Medicine     Progress Notes     Addendum     Date of Service: 3/5/2024  3:03 PM      Addendum        Expand All Collapse All    12,001 me see her will be gotten Jenny JEANNINE Fitzgerald MD  Physician  Internal Medicine     Progress Notes     Signed     Date of Service: 3/3/2024  4:26 PM      Signed        Expand All Collapse All    Irina Rodrigues is a 73 y.o. female on day 8 of admission presenting with UTI (urinary tract infection).           Subjective   Patient fully evaluated on February 28 and clinically improved.           , Alert good okayObjective   Last Recorded Vitals  /75   Pulse 87   Temp 36.8 °C (98.2 °F)   Resp 18   Wt 53.5 kg (117 lb 15.1 oz)   SpO2 93%   Intake/Output last 3 Shifts:     Intake/Output Summary (Last 24 hours) at 3/3/2024 1626  Last data filed at 3/3/2024 1500        Gross per 24 hour   Intake 990 ml   Output 1450 ml   Net -460 ml            Admission Weight  Weight: 52 kg (114 lb 10.2 oz) (02/24/24 0300)     Daily Weight  03/03/24 : 53.5 kg (117 lb 15.1 oz)     Image Results  Transthoracic Echo (TTE) [facility], [address] [place] [id number] [phone] and                                  Fax [phone]     TRANSTHORACIC ECHOCARDIOGRAM REPORT        Patient Name:      IRINA RODRIGUES     Reading Physician:    [id number] Garrett Daley MD  Study Date:        2/25/2024            Ordering Provider:    [id number] JARRELL                                                                 FÉLIX  MRN/PID:           [id number]             Fellow:  Accession#:        [id number]         Nurse:  Date of Birth/Age: 1950 / 73 years Sonographer:          Sonam Martinez RDCS  Gender:            F                    Additional Staff:  Height:            167.00 cm            Admit Date:           2/24/2024  Weight:            56.00 kg             Admission Status:     Inpatient -                                                                Priority discharge  BSA / BMI:         1.62 m2 / 20.08      Encounter#:           [id number]                     kg/m2                                          Department Location:  [facility]  Blood Pressure: 127 /59 mmHg     Study Type:    TRANSTHORACIC ECHO (TTE) COMPLETE  Diagnosis/ICD: Elevated Troponin-R79.89  Indication:    Elevated Troponin  CPT Code:      Echo Complete w Full Doppler-10801     Patient History:  Pertinent History: A-Fib, AAA and HTN.     Study Detail: The following Echo studies were performed: 2D, M-Mode, Doppler and                color flow. Technically challenging study due to prominent lung                artifact.        PHYSICIAN INTERPRETATION:  Left Ventricle: The left ventricular systolic function is low normal, with an estimated ejection fraction of 50%. There are no regional wall motion abnormalities. The left ventricular cavity size is normal. Spectral Doppler shows an impaired relaxation pattern of left ventricular diastolic filling.  Left Atrium: The left atrium is upper limits of normal in size.  Right Ventricle: The right ventricle is normal in size. There is normal right ventricular global systolic function.  Right Atrium: The right atrium is normal in size.  Aortic Valve: The aortic valve is trileaflet. There is evidence of mild aortic valve stenosis.  There is no evidence of aortic valve regurgitation. The peak instantaneous gradient of  the aortic valve is 11.3 mmHg. The mean gradient of the aortic valve is 6.0 mmHg.  Mitral Valve: The mitral valve is normal in structure. There is mild mitral valve regurgitation.  Tricuspid Valve: The tricuspid valve is structurally normal. No evidence of tricuspid regurgitation.  Pulmonic Valve: The pulmonic valve is structurally normal. There is no indication of pulmonic valve regurgitation.  Pericardium: There is no pericardial effusion noted.  Aorta: The aortic root is normal.        CONCLUSIONS:   1. Left ventricular systolic function is low normal with a 50% estimated ejection fraction.   2. Spectral Doppler shows an impaired relaxation pattern of left ventricular diastolic filling.   3. Mild aortic valve stenosis.     QUANTITATIVE DATA SUMMARY:  2D MEASUREMENTS:                            Normal Ranges:  IVSd:          1.05 cm    (0.6-1.1cm)  LVPWd:         1.02 cm    (0.6-1.1cm)  LVIDd:         4.78 cm    (3.9-5.9cm)  LVIDs:         3.27 cm  LV Mass Index: 109.0 g/m2  LV % FS        31.6 %     AORTA MEASUREMENTS:                     Normal Ranges:  Asc Ao, d: 3.20 cm (2.1-3.4cm)     LV SYSTOLIC FUNCTION BY 2D PLANIMETRY (MOD):                      Normal Ranges:  EF-A4C View: 52.0 % (>=55%)  EF-A2C View: 45.9 %  EF-Biplane:  49.7 %     LV DIASTOLIC FUNCTION:                         Normal Ranges:  MV Peak E:    0.93 m/s (0.7-1.2 m/s)  MV Peak A:    0.92 m/s (0.42-0.7 m/s)  E/A Ratio:    1.01     (1.0-2.2)  MV lateral e' 0.09 m/s  MV medial e'  0.08 m/s     MITRAL VALVE:                  Normal Ranges:  MV DT: 218 msec (150-240msec)     AORTIC VALVE:                                     Normal Ranges:  AoV Vmax:                1.68 m/s  (<=1.7m/s)  AoV Peak P.3 mmHg (<20mmHg)  AoV Mean P.0 mmHg  (1.7-11.5mmHg)  LVOT Max Oscar:            0.63 m/s  (<=1.1m/s)  AoV VTI:                 41.50 cm  (18-25cm)  LVOT VTI:                15.70 cm  LVOT Diameter:           2.20 cm    (1.8-2.4cm)  AoV Area, VTI:           1.44 cm2  (2.5-5.5cm2)  AoV Area,Vmax:           1.42 cm2  (2.5-4.5cm2)  AoV Dimensionless Index: 0.38        RIGHT VENTRICLE:  RV Basal 2.39 cm  RV Mid   1.60 cm  RV Major 7.0 cm  TAPSE:   20.5 mm  RV s'    0.16 m/s     TRICUSPID VALVE/RVSP:                              Normal Ranges:  Peak TR Velocity: 2.36 m/s  RV Syst Pressure: 25.3 mmHg (< 30mmHg)     PULMONIC VALVE:                       Normal Ranges:  PV Max Oscar: 0.8 m/s  (0.6-0.9m/s)  PV Max P.3 mmHg  PV Mean P.0 mmHg  PV VTI:     18.50 cm        [id number] Garrett Daley MD  Electronically signed on 2024 at 1:51:45 PM        ** Final **        Physical Exam     Relevant Results                       Assessment/Plan      H&P     Addendum     Date of Service: 2024  5:46 AM      Addendum        Expand All Collapse All    History Of Present Illness  Irina Stratton is a 73 y.o. female with past medical history of Parkinson's, CKD, hypothyroidism, atrial fibrillation on Xarelto, diabetes, hypertension, hyperlipidemia, depression, MI, sacral osteomyelitis, and cholelithiasis, who presented to [facility] ED today via EMS from home for lethargy. EMS stated she had a fever en route. Patient has a albarran catheter that was recently changed on Wednesday.  stated patient has been more lethargic than normal. He states she does not communicate much and is non-ambulatory at baseline. Additional history obtained from chart.     ED course: Temp 37.5C, /71, HR 96, RR 20, 95% RA. EKG unavailable for my review. Labs: WBC 12.0, neutrophils 10.04. RBC 3.98, H&H 10.9/36.4.[id number]. Sodium 135. BUN 29. . Troponin 13. UA: hazy, 3+protein, moderate blood, small leukocytes, 1+bacteria, RBC>20, WBC 21-50. Urine and blood cultures collected. See imaging results below. Azithromycin, zosyn, vancomycin started in ED. Patient will be admitted under the care of Dr. Fitzgerald who will continue to follow. I was asked to H&P and  place initial admission orders.  Past Medical History  Medical History           Past Medical History:   Diagnosis Date    Abnormal radiologic findings on diagnostic imaging of right kidney       Abnormal ultrasound of both kidneys    Personal history of other medical treatment       History of echocardiogram            Surgical History  Surgical History             Past Surgical History:   Procedure Laterality Date     SECTION, CLASSIC   2016      Section    CT ABDOMEN PELVIS ANGIOGRAM W AND/OR WO IV CONTRAST   2019     CT ABDOMEN PELVIS ANGIOGRAM W AND/OR WO IV CONTRAST 2019 [facility]    CT AORTA AND BILATERAL ILIOFEMORAL RUNOFF ANGIOGRAM W AND/OR WO IV CONTRAST   2020     CT AORTA AND BILATERAL ILIOFEMORAL RUNOFF ANGIOGRAM W AND/OR WO IV CONTRAST 2020 [facility]ICAL LEGAC[facility]    OTHER SURGICAL HISTORY   2020     History of prior surgery    OTHER SURGICAL HISTORY   2020     Coronary artery stent placement    OTHER SURGICAL HISTORY   2020     Abdominal aortic aneurysm repair endovascular    OTHER SURGICAL HISTORY   2020     Cardiac catheterization    OTHER SURGICAL HISTORY   2019     Renal angioplasty and stenting            Social History  She reports that she quit smoking about 13 months ago. Her smoking use included cigarettes. She has never used smokeless tobacco. She reports that she does not currently use alcohol. She reports that she does not use drugs. Lives with . Bedbound.     Family History  Family History               Family History   Problem Relation Name Age of Onset    Stroke Mother                Allergies  Patient has no known allergies.     Review of Systems   Limited ROS due to patient mentation.  Physical Exam  Constitutional:       Appearance: She is ill-appearing.      Comments: Cachetic appearance   HENT:      Head: Normocephalic and atraumatic.      Nose: Nose normal.      Mouth/Throat:      Mouth:  Mucous membranes are moist.      Pharynx: Oropharynx is clear.      Comments: Poor dentition  Eyes:      Extraocular Movements: Extraocular movements intact.      Conjunctiva/sclera: Conjunctivae normal.      Pupils: Pupils are equal, round, and reactive to light.   Cardiovascular:      Rate and Rhythm: Normal rate and regular rhythm.      Pulses: Normal pulses.      Heart sounds: Normal heart sounds.   Pulmonary:      Effort: Pulmonary effort is normal.      Breath sounds: Normal breath sounds.   Abdominal:      General: Abdomen is flat. Bowel sounds are normal.      Palpations: Abdomen is soft.   Genitourinary:     Comments: Rain in place  Musculoskeletal:         General: Normal range of motion.      Cervical back: Normal range of motion and neck supple.   Skin:     General: Skin is warm and dry.      Capillary Refill: Capillary refill takes 2 to 3 seconds.      Comments: Dressing to sacrum   Neurological:      General: No focal deficit present.      Mental Status: She is alert. She is disoriented.      Comments: Answered some questions and followed some commands. Confusion noted.   Psychiatric:         Mood and Affect: Mood normal.            Last Recorded Vitals  Blood pressure 140/70, pulse 98, temperature 37.5 °C (99.5 °F), temperature source Temporal, resp. rate 18, weight 52 kg (114 lb 10.2 oz), SpO2 94 %.     Relevant Results            Results for orders placed or performed during the hospital encounter of 02/23/24 (from the past 24 hour(s))   CBC and Auto Differential   Result Value Ref Range     WBC 12.0 (H) 4.4 - 11.3 x10*3/uL     nRBC 0.0 0.0 - 0.0 /100 WBCs     RBC 3.93 (L) 4.00 - 5.20 x10*6/uL     Hemoglobin 10.9 (L) 12.0 - 16.0 g/dL     Hematocrit 36.4 36.0 - 46.0 %     MCV 93 80 - 100 fL     MCH 27.7 26.0 - 34.0 pg     MCHC 29.9 (L) 32.0 - 36.0 g/dL     RDW 19.6 (H) 11.5 - 14.5 %     Platelets 232 150 - 450 x10*3/uL     Neutrophils % 83.7 40.0 - 80.0 %     Immature Granulocytes %, Automated 0.5  0.0 - 0.9 %     Lymphocytes % 7.8 13.0 - 44.0 %     Monocytes % 7.4 2.0 - 10.0 %     Eosinophils % 0.3 0.0 - 6.0 %     Basophils % 0.3 0.0 - 2.0 %     Neutrophils Absolute 10.04 (H) 1.60 - 5.50 x10*3/uL     Immature Granulocytes Absolute, Automated 0.06 0.00 - 0.50 x10*3/uL     Lymphocytes Absolute 0.94 0.80 - 3.00 x10*3/uL     Monocytes Absolute 0.89 (H) 0.05 - 0.80 x10*3/uL     Eosinophils Absolute 0.04 0.00 - 0.40 x10*3/uL     Basophils Absolute 0.04 0.00 - 0.10 x10*3/uL   Comprehensive Metabolic Panel   Result Value Ref Range     Glucose 166 (H) 74 - 99 mg/dL     Sodium 135 (L) 136 - 145 mmol/L     Potassium 4.1 3.5 - 5.3 mmol/L     Chloride 100 98 - 107 mmol/L     Bicarbonate 27 21 - 32 mmol/L     Anion Gap 12 10 - 20 mmol/L     Urea Nitrogen 29 (H) 6 - 23 mg/dL     Creatinine 0.80 0.50 - 1.05 mg/dL     eGFR 78 >60 mL/min/1.73m*2     Calcium 9.1 8.6 - 10.3 mg/dL     Albumin 3.4 3.4 - 5.0 g/dL     Alkaline Phosphatase 102 33 - 136 U/L     Total Protein 8.4 (H) 6.4 - 8.2 g/dL     AST 27 9 - 39 U/L     Bilirubin, Total 0.7 0.0 - 1.2 mg/dL     ALT 18 7 - 45 U/L   Lactate   Result Value Ref Range     Lactate 1.9 0.4 - 2.0 mmol/L   B-Type Natriuretic Peptide   Result Value Ref Range      (H) 0 - 99 pg/mL   Troponin I, High Sensitivity   Result Value Ref Range     Troponin I, High Sensitivity 13 0 - 13 ng/L   Urinalysis with Reflex Culture and Microscopic   Result Value Ref Range     Color, Urine Yellow Straw, Yellow     Appearance, Urine Hazy (N) Clear     Specific Gravity, Urine 1.017 1.005 - 1.035     pH, Urine 8.0 5.0, 5.5, 6.0, 6.5, 7.0, 7.5, 8.0     Protein, Urine >=500 (3+) (N) NEGATIVE mg/dL     Glucose, Urine NEGATIVE NEGATIVE mg/dL     Blood, Urine MODERATE (2+) (A) NEGATIVE     Ketones, Urine NEGATIVE NEGATIVE mg/dL     Bilirubin, Urine NEGATIVE NEGATIVE     Urobilinogen, Urine <2.0 <2.0 mg/dL     Nitrite, Urine NEGATIVE NEGATIVE     Leukocyte Esterase, Urine SMALL (1+) (A) NEGATIVE   Microscopic  Only, Urine   Result Value Ref Range     WBC, Urine 21-50 (A) 1-5, NONE /HPF     RBC, Urine >20 (A) NONE, 1-2, 3-5 /HPF     Bacteria, Urine 1+ (A) NONE SEEN /HPF      XR chest 1 view     Result Date: 2/24/2024  STUDY: Chest Radiograph;  02/23/2024 11:02 PM INDICATION: Sepsis, evaluate for source.  COMPARISON: XR chest 12/04/2022.  CT chest/abdomen/pelvis 06/01/2023.  ACCESSION NUMBER(S): [id number] ORDERING CLINICIAN: KENNA ALCALA TECHNIQUE:  Frontal chest was obtained at 2302 hours. FINDINGS: CARDIOMEDIASTINAL SILHOUETTE: Cardiomediastinal silhouette is enlarged in size and configuration. Calcified plaque is seen in the aorta.  LUNGS: Increased interstitial markings are seen bilaterally with subtle patchy right basilar airspace opacity.  ABDOMEN: No remarkable upper abdominal findings.  BONES: No acute osseous changes.  Generalized osseous demineralization is noted.     Cardiomegaly with increased interstitial markings and subtle patchy right infrahilar airspace opacity.  Mild pulmonary edema secondary to CHF is favored although right lower lobe pneumonia is a possibility in the appropriate clinical setting. Signed by Salvatore Parada            Assessment/Plan   Principal Problem:    UTI (urinary tract infection)      73-year-old female with past medical history of Parkinson's, CKD, hypothyroidism, atrial fibrillation on Xarelto, diabetes, hypertension, hyperlipidemia, depression, MI, sacral osteomyelitis, and cholelithiasis, who presented to [facility] ED today via EMS from home for lethargy. EMS stated she had a fever en route. Patient has a albarran catheter that was recently changed on Wednesday.  Continue antibiotics. Patient to be hospitalized for further medical management.      #Suspect sepsis likely 2/2 to pneumonia vs UTI  #Suspect pneumonia RLL  #Suspected UTI likely 2/2 chronic indwelling albarran catheter  #Elevated BNP  #Acute metabolic encephalopathy likely 2/2 suspected UTI/ pneumonia    #Leukocytosis  Admit to OBS/telemetry to Dr. Fitzgerald  Defer consults to attending per request  See imaging results above  Echocardiogram ordered  Continue Azithromycin, Zosyn and vancomycin. De-escalate as appropriate  Strep pneumonia urine and legionella ordered  Sputum culture ordered  Titrate oxygen as needed  Blood and Urine cultures pending  Change albarran catheter  Daily weight  PT/OT  Social work  Repeat labs in AM     #Pressure ulcer of sacral region, stage 4  #Pressure ulcer left hip, stage 1  Continue wound care regimen  Air mattress  Turn q 2 hours     #Acute on chronic anemia  H&H 10.9/36.4  Continue to monitor     Chronic issues  #Parkinson's disease  #CKD  #Hypothyroidism  #Atrial fibrillation  #DMII  #HTN  #HLD  #Depression  #MI  #Cholelithiasis  Continue home medications as appropriate when nursing completes home med rec  SSI with hypoglycemic protocol     #DVT prophylaxis  Continue home Xarelto  SCDs     I spent 45 minutes in the professional and overall care of this patient.           Addendum patient seen and evaluated agree with all the above to treat her for a urinary tract infection change in mental status history of Parkinson's repeat the CBC and the electrolytes continue with all above management thank you                     Revision History          This patient has a urinary catheter              Reason for the urinary catheter remaining today?               Principal Problem:    UTI (urinary tract infection)  Active Problems:    Urinary tract infection associated with indwelling urethral catheter, initial encounter (CMS/[facility])              Malnutrition     Patient fully evaluated on February 29.  IV antibiotics have been discontinued will monitor overnight.  Recheck labs in AM.  Remeron added for appetite stimulant  I agree with the dietitian's malnutrition diagnosis.        Patient fully evaluated on February 28.  Urine culture results are noted.  Continue present IV antibiotics for  additional 24 hours and recheck labs in a.m.  Patient fully evaluated on March 1.  Still with  and significant oral intake.  Family agreeable to PEG placement.  Continue present IV antibiotics for now     Patient fully evaluated on March 2.  Slight improvement in oral intake.  Patient agreeable to PEG placement now.  More awake and alert after narcotics were de-escalated.     Patient fully evaluated on March 3 and appetite slightly improved.  Continue puréed diet.  Await PEG placement.     Patient fully evaluated on March 4. Appetite still continues to be slightly improved. Patient continues to await PEG placement.      Balaji Fitzgerald MD                         Irina Stratton is a 73 y.o. female on day 10 of admission presenting with UTI (urinary tract infection).           Subjective   Patient clinically unchanged.  PEG placement planned for today.              Objective   Last Recorded Vitals  /81   Pulse 75   Temp 36.1 °C (97 °F)   Resp 16   Wt 53 kg (116 lb 13.5 oz)   SpO2 93%   Intake/Output last 3 Shifts:     Intake/Output Summary (Last 24 hours) at 3/5/2024 1503  Last data filed at 3/5/2024 1132        Gross per 24 hour   Intake 650 ml   Output 725 ml   Net -75 ml         Admission Weight  Weight: 52 kg (114 lb 10.2 oz) (02/24/24 0300)     Daily Weight  03/05/24 : 53 kg (116 lb 13.5 oz)     Image Results  EGD w PEG Tube Placement  Table formatting from the original result was not included.  Impression  Small type I hiatal hernia  The cardia, body of the stomach, greater curve of the stomach, lesser   curve of the stomach, incisura, antrum and prepyloric region appeared   normal.  PEG tube placed  The duodenal bulb, 1st part of the duodenum and 2nd part of the duodenum   appeared normal.     Findings  Small sliding hiatal hernia (type I hiatal hernia) without Favio lesions   present - diaphragmatic impression 37 cm from the incisors, confirmed by   retroflexion. Hill grade I hiatal hernia  The  cardia, body of the stomach, greater curve of the stomach, lesser   curve of the stomach, incisura, antrum and prepyloric region appeared   normal.  A [employer] PEG tube measuring 20 Fr was successfully placed using   a deformable internal bolster via the pull technique after the site was   identified via transillumination and visualized indentation; distance from   external bolster to external end of tube: 3 cm  The duodenal bulb, 1st part of the duodenum and 2nd part of the duodenum   appeared normal.     Recommendation   Follow up with PCP      Indication  Dementia and Fistulae     Staff  Staff Role   Reza Deal MD Proceduralist   Lakshmi Medina MD Proceduralist      Medications  No administrations occurring from 1023 to 1126 on 03/05/24      Preprocedure  A history and physical has been performed, and patient medication   allergies have been reviewed. The patient's tolerance of previous   anesthesia has been reviewed. The risks and benefits of the procedure and   the sedation options and risks were discussed with the patient. All   questions were answered and informed consent obtained.     Details of the Procedure  The patient underwent monitored anesthesia care, which was administered by   the procedural nurse and an anesthesia professional. The patient's blood   pressure, ECG, ETCO2, heart rate, level of consciousness, oxygen and   respirations were monitored throughout the procedure. The scope was   introduced through the mouth and advanced to the second part of the   duodenum. Retroflexion was performed in the cardia. Prior to the   procedure, the patient's H. Pylori status was unknown. The patient   experienced no blood loss. The procedure was not difficult. The patient   tolerated the procedure well. There were no apparent adverse events.      Antibiotics administered prior to procedure: yes  Site prep: Hibaclens  Patient Positioning: semi-upright            Events  Procedure Events    Event Event Time   ENDO SCOPE IN TIME 3/5/2024 11:13 AM   ENDO SCOPE OUT TIME 3/5/2024 11:20 AM      Specimens  No specimens collected     Procedure Location  [facility] 9  [address] [address]  [phone]     Referring Provider  No referring provider defined for this encounter.     Procedure Provider  No name on fileImpression  Small type I hiatal hernia  The cardia, body of the stomach, greater curve of the stomach, lesser   curve of the stomach, incisura, antrum and prepyloric region appeared   normal.  PEG tube placed  The duodenal bulb, 1st part of the duodenum and 2nd part of the duodenum   appeared normal.     Findings  Small sliding hiatal hernia (type I hiatal hernia) without Favio lesions   present - diaphragmatic impression 37 cm from the incisors, confirmed by   retroflexion. Hill grade I hiatal hernia  The cardia, body of the stomach, greater curve of the stomach, lesser   curve of the stomach, incisura, antrum and prepyloric region appeared   normal.  A [employer]ios PEG tube measuring 20 Fr was successfully placed using   a deformable internal bolster via the pull technique after the site was   identified via transillumination and visualized indentation; distance from   external bolster to external end of tube: 3 cm  The duodenal bulb, 1st part of the duodenum and 2nd part of the duodenum   appeared normal.     Recommendation   Follow up with PCP      Indication  Cachexia (CMS/HCC)     Staff  Staff Role   Reza Deal MD Proceduralist   Lakshmi Medina MD Proceduralist      Medications  See Anesthesia Record.      Preprocedure  A history and physical has been performed, and patient medication   allergies have been reviewed. The patient's tolerance of previous   anesthesia has been reviewed. The risks and benefits of the procedure and   the sedation options and risks were discussed with the patient. All   questions were answered and  informed consent obtained.     Details of the Procedure  The patient underwent monitored anesthesia care, which was administered by   the procedural nurse and an anesthesia professional. The patient's blood   pressure, ECG, ETCO2, heart rate, level of consciousness, oxygen and   respirations were monitored throughout the procedure. The scope was   introduced through the mouth and advanced to the second part of the   duodenum. Retroflexion was performed in the cardia. Prior to the   procedure, the patient's H. Pylori status was unknown. The patient   experienced no blood loss. The procedure was not difficult. The patient   tolerated the procedure well. There were no apparent adverse events.      Events  Procedure Events   Event Event Time   ENDO SCOPE IN TIME 3/5/2024 11:13 AM   ENDO SCOPE OUT TIME 3/5/2024 11:20 AM      Specimens  No specimens collected     Procedure Location  [facility] 9  [address] [address]  [phone]     Referring Provider  No referring provider defined for this encounter.     Procedure Provider  No name on file        Physical Exam     Relevant Results                          Assessment/Plan         This patient has a urinary catheter              Reason for the urinary catheter remaining today?       Balaji Fitzgerald MD  Physician  Internal Medicine     Progress Notes     Signed     Date of Service: 3/3/2024  4:26 PM      Signed        Expand All Collapse All    Irina Stratton is a 73 y.o. female on day 8 of admission presenting with UTI (urinary tract infection).           Subjective   Patient fully evaluated on February 28 and clinically improved.              Objective   Last Recorded Vitals  /75   Pulse 87   Temp 36.8 °C (98.2 °F)   Resp 18   Wt 53.5 kg (117 lb 15.1 oz)   SpO2 93%   Intake/Output last 3 Shifts:     Intake/Output Summary (Last 24 hours) at 3/3/2024 1626  Last data filed at 3/3/2024 1500        Gross per 24  hour   Intake 990 ml   Output 1450 ml   Net -460 ml            Admission Weight  Weight: 52 kg (114 lb 10.2 oz) (02/24/24 0300)     Daily Weight  03/03/24 : 53.5 kg (117 lb 15.1 oz)     Image Results  Transthoracic Echo (TTE) Complete      [facility], [address] [place] [id number] [phone] and                                  Fax [phone]     TRANSTHORACIC ECHOCARDIOGRAM REPORT        Patient Name:      ANTHONY Wong Physician:    [id number] Garrett Daley MD  Study Date:        2/25/2024            Ordering Provider:    [id number] PASTOR HEARD  MRN/PID:           [id number]             Fellow:  Accession#:        [id number]         Nurse:  Date of Birth/Age: 1950 / 73 years Sonographer:          Sonam Martinez RDCS  Gender:            F                    Additional Staff:  Height:            167.00 cm            Admit Date:           2/24/2024  Weight:            56.00 kg             Admission Status:     Inpatient -                                                                Priority discharge  BSA / BMI:         1.62 m2 / 20.08      Encounter#:           [id number]                     kg/m2                                          Department Location:  [facility]  Blood Pressure: 127 /59 mmHg     Study Type:    TRANSTHORACIC ECHO (TTE) COMPLETE  Diagnosis/ICD: Elevated Troponin-R79.89  Indication:    Elevated Troponin  CPT Code:      Echo Complete w Full Doppler-[id number]     Patient History:  Pertinent History: A-Fib, AAA and HTN.     Study Detail: The following Echo studies were performed: 2D, M-Mode, Doppler and                color flow. Technically challenging study due to prominent lung                artifact.        PHYSICIAN INTERPRETATION:  Left Ventricle: The left ventricular systolic function is low normal, with an estimated ejection  fraction of 50%. There are no regional wall motion abnormalities. The left ventricular cavity size is normal. Spectral Doppler shows an impaired relaxation pattern of left ventricular diastolic filling.  Left Atrium: The left atrium is upper limits of normal in size.  Right Ventricle: The right ventricle is normal in size. There is normal right ventricular global systolic function.  Right Atrium: The right atrium is normal in size.  Aortic Valve: The aortic valve is trileaflet. There is evidence of mild aortic valve stenosis.  There is no evidence of aortic valve regurgitation. The peak instantaneous gradient of the aortic valve is 11.3 mmHg. The mean gradient of the aortic valve is 6.0 mmHg.  Mitral Valve: The mitral valve is normal in structure. There is mild mitral valve regurgitation.  Tricuspid Valve: The tricuspid valve is structurally normal. No evidence of tricuspid regurgitation.  Pulmonic Valve: The pulmonic valve is structurally normal. There is no indication of pulmonic valve regurgitation.  Pericardium: There is no pericardial effusion noted.  Aorta: The aortic root is normal.        CONCLUSIONS:   1. Left ventricular systolic function is low normal with a 50% estimated ejection fraction.   2. Spectral Doppler shows an impaired relaxation pattern of left ventricular diastolic filling.   3. Mild aortic valve stenosis.     QUANTITATIVE DATA SUMMARY:  2D MEASUREMENTS:                            Normal Ranges:  IVSd:          1.05 cm    (0.6-1.1cm)  LVPWd:         1.02 cm    (0.6-1.1cm)  LVIDd:         4.78 cm    (3.9-5.9cm)  LVIDs:         3.27 cm  LV Mass Index: 109.0 g/m2  LV % FS        31.6 %     AORTA MEASUREMENTS:                     Normal Ranges:  Asc Ao, d: 3.20 cm (2.1-3.4cm)     LV SYSTOLIC FUNCTION BY 2D PLANIMETRY (MOD):                      Normal Ranges:  EF-A4C View: 52.0 % (>=55%)  EF-A2C View: 45.9 %  EF-Biplane:  49.7 %     LV DIASTOLIC FUNCTION:                         Normal  Ranges:  MV Peak E:    0.93 m/s (0.7-1.2 m/s)  MV Peak A:    0.92 m/s (0.42-0.7 m/s)  E/A Ratio:    1.01     (1.0-2.2)  MV lateral e' 0.09 m/s  MV medial e'  0.08 m/s     MITRAL VALVE:                  Normal Ranges:  MV DT: 218 msec (150-240msec)     AORTIC VALVE:                                     Normal Ranges:  AoV Vmax:                1.68 m/s  (<=1.7m/s)  AoV Peak P.3 mmHg (<20mmHg)  AoV Mean P.0 mmHg  (1.7-11.5mmHg)  LVOT Max Oscar:            0.63 m/s  (<=1.1m/s)  AoV VTI:                 41.50 cm  (18-25cm)  LVOT VTI:                15.70 cm  LVOT Diameter:           2.20 cm   (1.8-2.4cm)  AoV Area, VTI:           1.44 cm2  (2.5-5.5cm2)  AoV Area,Vmax:           1.42 cm2  (2.5-4.5cm2)  AoV Dimensionless Index: 0.38        RIGHT VENTRICLE:  RV Basal 2.39 cm  RV Mid   1.60 cm  RV Major 7.0 cm  TAPSE:   20.5 mm  RV s'    0.16 m/s     TRICUSPID VALVE/RVSP:                              Normal Ranges:  Peak TR Velocity: 2.36 m/s  RV Syst Pressure: 25.3 mmHg (< 30mmHg)     PULMONIC VALVE:                       Normal Ranges:  PV Max Oscar: 0.8 m/s  (0.6-0.9m/s)  PV Max P.3 mmHg  PV Mean P.0 mmHg  PV VTI:     18.50 cm        [id number] Garrett Daley MD  Electronically signed on 2024 at 1:51:45 PM        ** Final **        Physical Exam     Relevant Results                       Assessment/Plan      H&P     Addendum     Date of Service: 2024  5:46 AM      Addendum        Expand All Collapse All    History Of Present Illness  Irina Stratton is a 73 y.o. female with past medical history of Parkinson's, CKD, hypothyroidism, atrial fibrillation on Xarelto, diabetes, hypertension, hyperlipidemia, depression, MI, sacral osteomyelitis, and cholelithiasis, who presented to [facility] ED today via EMS from home for lethargy. EMS stated she had a fever en route. Patient has a albarran catheter that was recently changed on Wednesday.  stated patient has been more lethargic than normal.  He states she does not communicate much and is non-ambulatory at baseline. Additional history obtained from chart.     ED course: Temp 37.5C, /71, HR 96, RR 20, 95% RA. EKG unavailable for my review. Labs: WBC 12.0, neutrophils 10.04. RBC 3.98, H&H 10.9/36.4.[id number]. Sodium 135. BUN 29. . Troponin 13. UA: hazy, 3+protein, moderate blood, small leukocytes, 1+bacteria, RBC>20, WBC 21-50. Urine and blood cultures collected. See imaging results below. Azithromycin, zosyn, vancomycin started in ED. Patient will be admitted under the care of Dr. Fitzgerald who will continue to follow. I was asked to H&P and place initial admission orders.  Past Medical History  Medical History           Past Medical History:   Diagnosis Date    Abnormal radiologic findings on diagnostic imaging of right kidney       Abnormal ultrasound of both kidneys    Personal history of other medical treatment       History of echocardiogram            Surgical History  Surgical History             Past Surgical History:   Procedure Laterality Date     SECTION, CLASSIC   2016      Section    CT ABDOMEN PELVIS ANGIOGRAM W AND/OR WO IV CONTRAST   2019     CT ABDOMEN PELVIS ANGIOGRAM W AND/OR WO IV CONTRAST 2019 [facility]    CT AORTA AND BILATERAL ILIOFEMORAL RUNOFF ANGIOGRAM W AND/OR WO IV CONTRAST   2020     CT AORTA AND BILATERAL ILIOFEMORAL RUNOFF ANGIOGRAM W AND/OR WO IV CONTRAST 2020 [facility]ICAL LEGACY    OTHER SURGICAL HISTORY   2020     History of prior surgery    OTHER SURGICAL HISTORY   2020     Coronary artery stent placement    OTHER SURGICAL HISTORY   2020     Abdominal aortic aneurysm repair endovascular    OTHER SURGICAL HISTORY   2020     Cardiac catheterization    OTHER SURGICAL HISTORY   2019     Renal angioplasty and stenting            Social History  She reports that she quit smoking about 13 months ago. Her smoking use included  cigarettes. She has never used smokeless tobacco. She reports that she does not currently use alcohol. She reports that she does not use drugs. Lives with . Bedbound.     Family History  Family History               Family History   Problem Relation Name Age of Onset    Stroke Mother                Allergies  Patient has no known allergies.     Review of Systems   Limited ROS due to patient mentation.  Physical Exam  Constitutional:       Appearance: She is ill-appearing.      Comments: Cachetic appearance   HENT:      Head: Normocephalic and atraumatic.      Nose: Nose normal.      Mouth/Throat:      Mouth: Mucous membranes are moist.      Pharynx: Oropharynx is clear.      Comments: Poor dentition  Eyes:      Extraocular Movements: Extraocular movements intact.      Conjunctiva/sclera: Conjunctivae normal.      Pupils: Pupils are equal, round, and reactive to light.   Cardiovascular:      Rate and Rhythm: Normal rate and regular rhythm.      Pulses: Normal pulses.      Heart sounds: Normal heart sounds.   Pulmonary:      Effort: Pulmonary effort is normal.      Breath sounds: Normal breath sounds.   Abdominal:      General: Abdomen is flat. Bowel sounds are normal.      Palpations: Abdomen is soft.   Genitourinary:     Comments: Rain in place  Musculoskeletal:         General: Normal range of motion.      Cervical back: Normal range of motion and neck supple.   Skin:     General: Skin is warm and dry.      Capillary Refill: Capillary refill takes 2 to 3 seconds.      Comments: Dressing to sacrum   Neurological:      General: No focal deficit present.      Mental Status: She is alert. She is disoriented.      Comments: Answered some questions and followed some commands. Confusion noted.   Psychiatric:         Mood and Affect: Mood normal.            Last Recorded Vitals  Blood pressure 140/70, pulse 98, temperature 37.5 °C (99.5 °F), temperature source Temporal, resp. rate 18, weight 52 kg (114 lb 10.2 oz),  SpO2 94 %.     Relevant Results            Results for orders placed or performed during the hospital encounter of 02/23/24 (from the past 24 hour(s))   CBC and Auto Differential   Result Value Ref Range     WBC 12.0 (H) 4.4 - 11.3 x10*3/uL     nRBC 0.0 0.0 - 0.0 /100 WBCs     RBC 3.93 (L) 4.00 - 5.20 x10*6/uL     Hemoglobin 10.9 (L) 12.0 - 16.0 g/dL     Hematocrit 36.4 36.0 - 46.0 %     MCV 93 80 - 100 fL     MCH 27.7 26.0 - 34.0 pg     MCHC 29.9 (L) 32.0 - 36.0 g/dL     RDW 19.6 (H) 11.5 - 14.5 %     Platelets 232 150 - 450 x10*3/uL     Neutrophils % 83.7 40.0 - 80.0 %     Immature Granulocytes %, Automated 0.5 0.0 - 0.9 %     Lymphocytes % 7.8 13.0 - 44.0 %     Monocytes % 7.4 2.0 - 10.0 %     Eosinophils % 0.3 0.0 - 6.0 %     Basophils % 0.3 0.0 - 2.0 %     Neutrophils Absolute 10.04 (H) 1.60 - 5.50 x10*3/uL     Immature Granulocytes Absolute, Automated 0.06 0.00 - 0.50 x10*3/uL     Lymphocytes Absolute 0.94 0.80 - 3.00 x10*3/uL     Monocytes Absolute 0.89 (H) 0.05 - 0.80 x10*3/uL     Eosinophils Absolute 0.04 0.00 - 0.40 x10*3/uL     Basophils Absolute 0.04 0.00 - 0.10 x10*3/uL   Comprehensive Metabolic Panel   Result Value Ref Range     Glucose 166 (H) 74 - 99 mg/dL     Sodium 135 (L) 136 - 145 mmol/L     Potassium 4.1 3.5 - 5.3 mmol/L     Chloride 100 98 - 107 mmol/L     Bicarbonate 27 21 - 32 mmol/L     Anion Gap 12 10 - 20 mmol/L     Urea Nitrogen 29 (H) 6 - 23 mg/dL     Creatinine 0.80 0.50 - 1.05 mg/dL     eGFR 78 >60 mL/min/1.73m*2     Calcium 9.1 8.6 - 10.3 mg/dL     Albumin 3.4 3.4 - 5.0 g/dL     Alkaline Phosphatase 102 33 - 136 U/L     Total Protein 8.4 (H) 6.4 - 8.2 g/dL     AST 27 9 - 39 U/L     Bilirubin, Total 0.7 0.0 - 1.2 mg/dL     ALT 18 7 - 45 U/L   Lactate   Result Value Ref Range     Lactate 1.9 0.4 - 2.0 mmol/L   B-Type Natriuretic Peptide   Result Value Ref Range      (H) 0 - 99 pg/mL   Troponin I, High Sensitivity   Result Value Ref Range     Troponin I, High Sensitivity 13 0 - 13  ng/L   Urinalysis with Reflex Culture and Microscopic   Result Value Ref Range     Color, Urine Yellow Straw, Yellow     Appearance, Urine Hazy (N) Clear     Specific Gravity, Urine 1.017 1.005 - 1.035     pH, Urine 8.0 5.0, 5.5, 6.0, 6.5, 7.0, 7.5, 8.0     Protein, Urine >=500 (3+) (N) NEGATIVE mg/dL     Glucose, Urine NEGATIVE NEGATIVE mg/dL     Blood, Urine MODERATE (2+) (A) NEGATIVE     Ketones, Urine NEGATIVE NEGATIVE mg/dL     Bilirubin, Urine NEGATIVE NEGATIVE     Urobilinogen, Urine <2.0 <2.0 mg/dL     Nitrite, Urine NEGATIVE NEGATIVE     Leukocyte Esterase, Urine SMALL (1+) (A) NEGATIVE   Microscopic Only, Urine   Result Value Ref Range     WBC, Urine 21-50 (A) 1-5, NONE /HPF     RBC, Urine >20 (A) NONE, 1-2, 3-5 /HPF     Bacteria, Urine 1+ (A) NONE SEEN /HPF      XR chest 1 view     Result Date: 2/24/2024  STUDY: Chest Radiograph;  02/23/2024 11:02 PM INDICATION: Sepsis, evaluate for source.  COMPARISON: XR chest 12/04/2022.  CT chest/abdomen/pelvis 06/01/2023.  ACCESSION NUMBER(S): [id number] ORDERING CLINICIAN: KENNA ALCALA TECHNIQUE:  Frontal chest was obtained at 2302 hours. FINDINGS: CARDIOMEDIASTINAL SILHOUETTE: Cardiomediastinal silhouette is enlarged in size and configuration. Calcified plaque is seen in the aorta.  LUNGS: Increased interstitial markings are seen bilaterally with subtle patchy right basilar airspace opacity.  ABDOMEN: No remarkable upper abdominal findings.  BONES: No acute osseous changes.  Generalized osseous demineralization is noted.     Cardiomegaly with increased interstitial markings and subtle patchy right infrahilar airspace opacity.  Mild pulmonary edema secondary to CHF is favored although right lower lobe pneumonia is a possibility in the appropriate clinical setting. Signed by Salvatore Parada            Assessment/Plan   Principal Problem:    UTI (urinary tract infection)      73-year-old female with past medical history of Parkinson's, CKD, hypothyroidism, atrial  fibrillation on Xarelto, diabetes, hypertension, hyperlipidemia, depression, MI, sacral osteomyelitis, and cholelithiasis, who presented to [facility] ED today via EMS from home for lethargy. EMS stated she had a fever en route. Patient has a albarran catheter that was recently changed on Wednesday.  Continue antibiotics. Patient to be hospitalized for further medical management.      #Suspect sepsis likely 2/2 to pneumonia vs UTI  #Suspect pneumonia RLL  #Suspected UTI likely 2/2 chronic indwelling albarran catheter  #Elevated BNP  #Acute metabolic encephalopathy likely 2/2 suspected UTI/ pneumonia   #Leukocytosis  Admit to OBS/telemetry to Dr. Fitzgerald  Defer consults to attending per request  See imaging results above  Echocardiogram ordered  Continue Azithromycin, Zosyn and vancomycin. De-escalate as appropriate  Strep pneumonia urine and legionella ordered  Sputum culture ordered  Titrate oxygen as needed  Blood and Urine cultures pending  Change albarran catheter  Daily weight  PT/OT  Social work  Repeat labs in AM     #Pressure ulcer of sacral region, stage 4  #Pressure ulcer left hip, stage 1  Continue wound care regimen  Air mattress  Turn q 2 hours     #Acute on chronic anemia  H&H 10.9/36.4  Continue to monitor     Chronic issues  #Parkinson's disease  #CKD  #Hypothyroidism  #Atrial fibrillation  #DMII  #HTN  #HLD  #Depression  #MI  #Cholelithiasis  Continue home medications as appropriate when nursing completes home med rec  SSI with hypoglycemic protocol     #DVT prophylaxis  Continue home Xarelto  SCDs     I spent 45 minutes in the professional and overall care of this patient.           Addendum patient seen and evaluated agree with all the above to treat her for a urinary tract infection change in mental status history of Parkinson's repeat the CBC and the electrolytes continue with all above management thank you                     Revision History          This patient has a urinary catheter              Reason  for the urinary catheter remaining today?               Principal Problem:    UTI (urinary tract infection)  Active Problems:    Urinary tract infection associated with indwelling urethral catheter, initial encounter (CMS/[facility])              Malnutrition     Patient fully evaluated on February 29.  IV antibiotics have been discontinued will monitor overnight.  Recheck labs in AM.  Remeron added for appetite stimulant  I agree with the dietitian's malnutrition diagnosis.        Patient fully evaluated on February 28.  Urine culture results are noted.  Continue present IV antibiotics for additional 24 hours and recheck labs in a.m.  Patient fully evaluated on March 1.  Still with  and significant oral intake.  Family agreeable to PEG placement.  Continue present IV antibiotics for now     Patient fully evaluated on March 2.  Slight improvement in oral intake.  Patient agreeable to PEG placement now.  More awake and alert after narcotics were de-escalated.     Patient fully evaluated on March 3 and appetite slightly improved.  Continue puréed diet.  Await PEG placement.     Patient fully evaluated on March 4. Appetite still continues to be slightly improved. Patient continues to await PEG placement.   Patient fully evaluated on March 5.  PEG placement today.  Tube feeds to be initiated.  Continue diet and advance as tolerated.  Recheck labs in AM.  Balaji Fitzgerald MD                                       Principal Problem:    UTI (urinary tract infection)  Active Problems:    Urinary tract infection associated with indwelling urethral catheter, initial encounter (CMS/[facility])              Malnutrition    I agree with the dietitian's malnutrition diagnosis.        Patient fully evaluated and will require PEG feeding tube for severe protein calorie malnutrition with significant dysphagia.  Patient's main source of nutrition will be tube feeds with a pleasure diet as tolerated     Patient fully evaluated on March 7. Patient  appears to have clinically improved. Patient has no further concerns at this time. Patient to be discharged.    Balaji Fitzgerald MD                        Revision History          Pertinent Physical Exam At Time of Discharge  Physical Exam     Outpatient Follow-Up         Future Appointments   Date Time Provider Department [place]   3/15/2024 11:00 AM Woundcare Provider [id number] [place]   4/9/2024 11:40 AM Mindi Stahl MD [id number] [place]   6/27/2024 11:00 AM Mario Joe MD [id number] [place]            Balaji Fitzgerald MD                     Pertinent Physical Exam At Time of Discharge  Physical Exam    Outpatient Follow-Up  Future Appointments   Date Time Provider Department Anita   3/15/2024 11:00 AM Woundcare Provider [id number] [place]   4/9/2024 11:40 AM Mindi Stahl MD [id number] [place]   6/27/2024 11:00 AM Mario Joe MD [id number] [place]         INA MEAD

## 2024-03-07 NOTE — CARE PLAN
The patient's goals for the shift include Do not pull line    The clinical goals for the shift include Remain hemodynamically stable    Problem: Pain - Adult  Goal: Verbalizes/displays adequate comfort level or baseline comfort level  Outcome: Progressing     Problem: Safety - Adult  Goal: Free from fall injury  Outcome: Progressing     Problem: Skin  Goal: Decreased wound size/increased tissue granulation at next dressing change  Outcome: Progressing  Goal: Participates in plan/prevention/treatment measures  Outcome: Progressing  Goal: Prevent/manage excess moisture  Outcome: Progressing  Goal: Prevent/minimize sheer/friction injuries  Outcome: Progressing  Goal: Promote/optimize nutrition  Outcome: Progressing  Goal: Promote skin healing  Outcome: Progressing

## 2024-03-07 NOTE — NURSING NOTE
1500 reviewed dc packet with pt and family including daughter who is an RN. Answered all questions.   1530 Escorted pt off unit via wheelchair with all belongings in stable condition. Mely Nair confirmed home healthcare services arrived today with tube feed supplies at pts residence.

## 2024-03-07 NOTE — PROGRESS NOTES
Pt discharging home today with [facility] and TF's from [facility], Form for Statement of Medical Necessity signed and dated per MD and emailed to [facility]  [facility] this later afternoon,   at bedside updated on this discharge.  HHC orders sent in [facility].   Mely Nair RN [facility]

## 2024-03-13 NOTE — PROGRESS NOTES
Chief Complain  Follow-up (Medication follow up -Percocet 5mg, was trialed on Tramadol while inpatient d/t feeling was sedated affect of percocet. No change in affect between Percocet and theTramadol trial.)       History Of Present Illness  Irina Stratton is a 73 y.o. female here for widespread specially in low back radiating to legs . The patient rates the pain at 0  on a scale from 0-10.  The patient is unable to describe her pain.  The pain is worsened by no aggravating factors identified and is alleviated by medications prescribed pain medications.  Since the last visit the pain has stayed the same.  The patient denies any fever, chills, weight loss, bladder/bowel incontinence.     Since last visit multiple hospitalization recently she was in [facility] from  to  for any UTI and possible infection.      Past Medical History  She has a past medical history of Abnormal radiologic findings on diagnostic imaging of right kidney and Personal history of other medical treatment.    Surgical History  She has a past surgical history that includes  section, classic (2016); Other surgical history (2020); Other surgical history (2020); Other surgical history (2020); Other surgical history (2020); Other surgical history (2019); CT angio abdomen pelvis w and or wo IV IV contrast (2019); and CT angio aorta and bilateral iliofemoral runoff w and or wo IV contrast (2020).     Social History  She reports that she quit smoking about 14 months ago. Her smoking use included cigarettes. She has never used smokeless tobacco. She reports that she does not currently use alcohol. She reports that she does not use drugs.    Family History  Family History   Problem Relation Name Age of Onset    Stroke Mother          Allergies  Vancomycin    Review of Systems  Review of Systems   Respiratory:  Negative for shortness of breath.    Cardiovascular:   Negative for chest pain.   Musculoskeletal:  Positive for back pain.        Physical Exam  Physical Exam  Pulmonary:      Effort: Pulmonary effort is normal.   Neurological:      Mental Status: She is disoriented.           Last Recorded Vitals  Blood pressure 149/88, pulse 98, temperature 36.5 °C (97.7 °F), weight 49 kg (108 lb), SpO2 95 %.       Assessment/Plan     Irina Stratton is a 73 y.o. female here for follow-up of low back pain radiating to lower extremities.  She is currently nonverbal unable to communicate in any meaningful way.  Most of the history was taken from her  and her daughter who accompanied her.  She is on chronic opiate therapy with Percocet here for continued med management.  Since the last visit she has had multiple hospital visits for UTIs and other infections.  Since the last visit her mental status has worsened significantly.  She does have history of dementia, more recently she has been diagnosed with parkinsonism.  Currently being followed by neurology.  During the hospitalization she was switched from Percocet to tramadol and he did not have any effect on her mental status.  Without the pain medications she is in severe pain, she screams, moans and groans.  With Percocet 2-3 times a day she is fairly comfortable.  No significant side effects.  Currently she does not need any refills she does have around 50 tablets left from the last refill which she got from her hospitalization.  I would continue with the current regimen.  Continue following up with neurology.  I have personally reviewed the OARRS report.  I have considered the risks of abuse, dependence, addiction and diversion.    I spent 20 minutes in the professional and overall care of this patient.       Ramiro Liu MD

## 2024-03-23 NOTE — ED NOTES
Attempted to call medical examiner sts they are busy and will call back later     Ruth Mattson RN  03/23/24 6968

## 2024-03-23 NOTE — ED PROVIDER NOTES
HPI   No chief complaint on file.      73-year-old female presented to the emergency department in cardiac arrest.  She reportedly was hypoxic per EMS and had a pulse.  They placed her on a nonrebreather and brought her to the emergency department.  Upon arrival patient was unresponsive and had no pulse.  Reportedly for EMS she was unresponsive as well.                          No data recorded                   Patient History   Past Medical History:   Diagnosis Date    Abnormal radiologic findings on diagnostic imaging of right kidney     Abnormal ultrasound of both kidneys    Personal history of other medical treatment     History of echocardiogram     Past Surgical History:   Procedure Laterality Date     SECTION, CLASSIC  2016     Section    CT ABDOMEN PELVIS ANGIOGRAM W AND/OR WO IV CONTRAST  2019    CT ABDOMEN PELVIS ANGIOGRAM W AND/OR WO IV CONTRAST 2019 [facility]    CT AORTA AND BILATERAL ILIOFEMORAL RUNOFF ANGIOGRAM W AND/OR WO IV CONTRAST  2020    CT AORTA AND BILATERAL ILIOFEMORAL RUNOFF ANGIOGRAM W AND/OR WO IV CONTRAST 2020 [facility] CLINICAL LEGACY    OTHER SURGICAL HISTORY  2020    History of prior surgery    OTHER SURGICAL HISTORY  2020    Coronary artery stent placement    OTHER SURGICAL HISTORY  2020    Abdominal aortic aneurysm repair endovascular    OTHER SURGICAL HISTORY  2020    Cardiac catheterization    OTHER SURGICAL HISTORY  2019    Renal angioplasty and stenting     Family History   Problem Relation Name Age of Onset    Stroke Mother       Social History     Tobacco Use    Smoking status: Former     Types: Cigarettes     Quit date:      Years since quittin.2    Smokeless tobacco: Never   Vaping Use    Vaping Use: Never used   Substance Use Topics    Alcohol use: Not Currently    Drug use: Never       Physical Exam   ED Triage Vitals [24 0750]   Temp Pulse Resp BP   -- -- -- --      SpO2 Temp src  Heart Rate Source Patient Position   -- -- -- --      BP Location FiO2 (%)     -- 100 %       Physical Exam  Constitutional:       Comments: Unresponsive   Eyes:      Comments: Fixed dilated pupils   Cardiovascular:      Comments: Pulseless  Pulmonary:      Comments: Apneic  Musculoskeletal:      Comments: No gross deformities   Skin:     Comments: Mottled skin   Neurological:      GCS: GCS eye subscore is 1. GCS verbal subscore is 1. GCS motor subscore is 1.         ED Course & MDM   ED Course as of 24 0205   Sat Mar 23, 2024   08 Time of death 8:17am [ZS]   0838 73-year-old female presented to the emergency department cardiac arrest.  On examination patient was pulseless apneic pale unresponsive with a GCS of 3.  CPR was initiated.  Patient was intubated.  H's and T's were optimally reversed.  For predominant part of the resuscitation patient was in asystole or PEA and had 1 episode of V-fib.  Patient had 20 minutes of nonshockable rhythm towards the end of the resuscitation.  Patient  at 8:17 AM.  Attempting to call patient's primary to notify them as well as notify family members.  ACLS protocol was followed throughout resuscitation.  Patient was given bicarb normal saline epinephrine and glucose during the resuscitation as well as calcium. [ZS]   0843 Did notify Dr. Rojo via [employer] chat of patient expiration. [ZS]   0922  has been notified [ZS]      ED Course User Index  [ZS] Frida Tracy MD         Diagnoses as of 24 0205   Cardiac arrest (CMS/[facility])       Medical Decision Making      Procedure  Critical Care    Performed by: Frida Tracy MD  Authorized by: Frida Tracy MD    Critical care provider statement:     Critical care time (minutes):  35    Critical care time was exclusive of:  Separately billable procedures and treating other patients (cardiac arrest)    Critical care was necessary to treat or prevent imminent or life-threatening deterioration of the following  conditions: cardiac arrest.    Critical care was time spent personally by me on the following activities:  Ordering and performing treatments and interventions, re-evaluation of patient's condition, development of treatment plan with patient or surrogate and examination of patient       Frida Tracy MD  03/28/24 6316

## 2024-03-23 NOTE — ED NOTES
0756 epi  0757 bicarb  0757 vfib       0757 shock 200 j  0759 epi  0759 pea   0801 pea  0802 epi   0803 pea  0805 epi  0807 pea   0808 epi  0809 pea  0811 epi  0813 pea  0814 epi  0815 pea   0817 aysytole cardiac stand still 9817     Ruth Mattson, DWAYNE  03/23/24 1002

## 2024-03-23 NOTE — ED NOTES
Pt arrives to ED via EMS from home. EMS reports that family sts that she's is unresponsive and having trouble breathing. Upon arrival pt had no pulse no vitals were obtain in route.   CPR start 0741  0743 no pulse pea  0745 no pulse pea  0747 I/O epi  0747 no pulse asystole  0748 bicarb  0749 darren gluconate  0750 epi  0751 pea no pulse  0753 epi  0753 pea   0755 pea       Ruth Mattson RN  03/23/24 0937    0747   intubated      Ruth Mattson RN  03/23/24 1118

## 2024-03-28 NOTE — ED PROCEDURE NOTE
Procedure  Intubation    Performed by: Frida Tracy MD  Authorized by: Frida Tracy MD    Consent:     Consent obtained:  Emergent situation  Pre-procedure details:     Indications: cardio/pulmonary arrest      Patient status:  Unresponsive    Look externally: no concerns      Mallampati score:  II    Obstruction: none      Neck mobility: normal      Pharmacologic strategy: none      Induction agents:  None    Paralytics:  None  Procedure details:     Preoxygenation:  Bag valve mask    CPR in progress: yes      Number of attempts:  1  Successful intubation attempt details:     Intubation method:  Oral    Intubation technique: video assisted      Laryngoscope blade:  Hypercurved    Bougie used: no      Grade view: II      Tube size (mm):  7.5    Tube type:  Cuffed    Tube visualized through cords: yes    Placement assessment:     Tube secured with:  ETT chun    Breath sounds:  Equal    Placement verification: equal breath sounds and waveform ETCO2    Post-procedure details:     Procedure completion:  Tolerated               Frida Tracy MD  03/28/24 0207

## 2024-03-29 ENCOUNTER — APPOINTMENT (OUTPATIENT)
Dept: WOUND CARE | Facility: CLINIC | Age: 74
End: 2024-03-29
Payer: MEDICARE

## 2024-04-09 ENCOUNTER — APPOINTMENT (OUTPATIENT)
Dept: NEUROLOGY | Facility: CLINIC | Age: 74
End: 2024-04-09
Payer: MEDICARE

## 2024-05-07 ENCOUNTER — APPOINTMENT (OUTPATIENT)
Dept: GASTROENTEROLOGY | Facility: CLINIC | Age: 74
End: 2024-05-07
Payer: MEDICARE

## 2024-06-27 ENCOUNTER — APPOINTMENT (OUTPATIENT)
Dept: CARDIOLOGY | Facility: CLINIC | Age: 74
End: 2024-06-27
Payer: MEDICARE